# Patient Record
Sex: FEMALE | Race: WHITE | NOT HISPANIC OR LATINO | Employment: OTHER | URBAN - METROPOLITAN AREA
[De-identification: names, ages, dates, MRNs, and addresses within clinical notes are randomized per-mention and may not be internally consistent; named-entity substitution may affect disease eponyms.]

---

## 2017-01-31 ENCOUNTER — GENERIC CONVERSION - ENCOUNTER (OUTPATIENT)
Dept: OTHER | Facility: OTHER | Age: 67
End: 2017-01-31

## 2017-05-02 ENCOUNTER — ALLSCRIPTS OFFICE VISIT (OUTPATIENT)
Dept: OTHER | Facility: OTHER | Age: 67
End: 2017-05-02

## 2017-05-02 DIAGNOSIS — E03.9 HYPOTHYROIDISM: ICD-10-CM

## 2017-05-02 DIAGNOSIS — Z13.820 ENCOUNTER FOR SCREENING FOR OSTEOPOROSIS: ICD-10-CM

## 2017-05-09 ENCOUNTER — LAB CONVERSION - ENCOUNTER (OUTPATIENT)
Dept: OTHER | Facility: OTHER | Age: 67
End: 2017-05-09

## 2017-05-09 LAB
A/G RATIO (HISTORICAL): 1.7 (CALC) (ref 1–2.5)
ALBUMIN SERPL BCP-MCNC: 4.4 G/DL (ref 3.6–5.1)
ALP SERPL-CCNC: 82 U/L (ref 33–130)
ALT SERPL W P-5'-P-CCNC: 18 U/L (ref 6–29)
AST SERPL W P-5'-P-CCNC: 16 U/L (ref 10–35)
BILIRUB SERPL-MCNC: 0.8 MG/DL (ref 0.2–1.2)
BUN SERPL-MCNC: 17 MG/DL (ref 7–25)
BUN/CREA RATIO (HISTORICAL): ABNORMAL (CALC) (ref 6–22)
CALCIUM SERPL-MCNC: 9.5 MG/DL (ref 8.6–10.4)
CHLORIDE SERPL-SCNC: 103 MMOL/L (ref 98–110)
CO2 SERPL-SCNC: 27 MMOL/L (ref 20–31)
CREAT SERPL-MCNC: 0.9 MG/DL (ref 0.5–0.99)
EGFR AFRICAN AMERICAN (HISTORICAL): 76 ML/MIN/1.73M2
EGFR-AMERICAN CALC (HISTORICAL): 66 ML/MIN/1.73M2
GAMMA GLOBULIN (HISTORICAL): 2.6 G/DL (CALC) (ref 1.9–3.7)
GLUCOSE (HISTORICAL): 112 MG/DL (ref 65–99)
POTASSIUM SERPL-SCNC: 4.5 MMOL/L (ref 3.5–5.3)
SODIUM SERPL-SCNC: 138 MMOL/L (ref 135–146)
T4 FREE SERPL-MCNC: 1.2 NG/DL (ref 0.8–1.8)
TOTAL PROTEIN (HISTORICAL): 7 G/DL (ref 6.1–8.1)
TSH SERPL DL<=0.05 MIU/L-ACNC: 1.26 MIU/L (ref 0.4–4.5)

## 2017-05-27 ENCOUNTER — HOSPITAL ENCOUNTER (OUTPATIENT)
Dept: RADIOLOGY | Facility: CLINIC | Age: 67
Discharge: HOME | End: 2017-05-27
Payer: MEDICARE

## 2017-05-27 ENCOUNTER — ALLSCRIPTS OFFICE VISIT (OUTPATIENT)
Dept: OTHER | Facility: OTHER | Age: 67
End: 2017-05-27

## 2017-05-27 DIAGNOSIS — M25.511 RIGHT SHOULDER PAIN: ICD-10-CM

## 2017-05-27 PROCEDURE — 73030 X-RAY EXAM OF SHOULDER: CPT

## 2017-06-20 ENCOUNTER — ALLSCRIPTS OFFICE VISIT (OUTPATIENT)
Dept: OTHER | Facility: OTHER | Age: 67
End: 2017-06-20

## 2017-07-03 ENCOUNTER — ALLSCRIPTS OFFICE VISIT (OUTPATIENT)
Dept: OTHER | Facility: OTHER | Age: 67
End: 2017-07-03

## 2017-07-03 ENCOUNTER — GENERIC CONVERSION - ENCOUNTER (OUTPATIENT)
Dept: OTHER | Facility: OTHER | Age: 67
End: 2017-07-03

## 2017-07-03 LAB
BILIRUB UR QL STRIP: NORMAL
CLARITY UR: NORMAL
COLOR UR: CLEAR
GLUCOSE (HISTORICAL): NORMAL
HGB UR QL STRIP.AUTO: NORMAL
KETONES UR STRIP-MCNC: NORMAL MG/DL
LEUKOCYTE ESTERASE UR QL STRIP: NORMAL
NITRITE UR QL STRIP: POSITIVE
PH UR STRIP.AUTO: 5 [PH]
SP GR UR STRIP.AUTO: 1.01
UROBILINOGEN UR QL STRIP.AUTO: 0.2

## 2017-07-07 ENCOUNTER — ALLSCRIPTS OFFICE VISIT (OUTPATIENT)
Dept: OTHER | Facility: OTHER | Age: 67
End: 2017-07-07

## 2017-07-07 ENCOUNTER — GENERIC CONVERSION - ENCOUNTER (OUTPATIENT)
Dept: OTHER | Facility: OTHER | Age: 67
End: 2017-07-07

## 2017-08-03 ENCOUNTER — GENERIC CONVERSION - ENCOUNTER (OUTPATIENT)
Dept: OTHER | Facility: OTHER | Age: 67
End: 2017-08-03

## 2017-08-04 ENCOUNTER — GENERIC CONVERSION - ENCOUNTER (OUTPATIENT)
Dept: OTHER | Facility: OTHER | Age: 67
End: 2017-08-04

## 2017-08-04 LAB — LYME IGG/IGM AB (HISTORICAL): <0.9 INDEX

## 2018-01-10 NOTE — RESULT NOTES
Verified Results  (Q) LYME DISEASE AB, TOTAL W/REFL WB (IGG, IGM) [id number] 12:00AM Donaldo Bartlett     Test Name Result Flag Reference   LYME AB SCREEN <0 90 index     Index                Interpretation                     -----                --------------                     < 0 90               Negative                     0  90-1 09            Equivocal                     > 1 09               Positive      As recommended by the Food and Drug Administration   (FDA), all samples with positive or equivocal   results in a Borrelia burgdorferi antibody screen  will be tested using a blot method  Positive or   equivocal screening test results should not be   interpreted as truly positive until verified as such   using a supplemental assay (e g , B  burgdorferi blot)  The screening test and/or blot for B  burgdorferi   antibodies may be falsely negative in early stages  of Lyme disease, including the period when erythema   migrans is apparent

## 2018-01-12 NOTE — RESULT NOTES
Verified Results  (1) COMPREHENSIVE METABOLIC PANEL [id number] 84:82NV Noni Moses     Test Name Result Flag Reference   GLUCOSE 103 mg/dL H 65-99   Fasting reference interval   UREA NITROGEN (BUN) 13 mg/dL  7-25   CREATININE 0 76 mg/dL  0 50-0 99   For patients >52years of age, the reference limit  for Creatinine is approximately 13% higher for people  identified as -American  eGFR NON-AFR  AMERICAN 81 mL/min/1 73m2  > OR = 60   eGFR AFRICAN AMERICAN 94 mL/min/1 73m2  > OR = 60   BUN/CREATININE RATIO   7-78   NOT APPLICABLE (calc)   SODIUM 140 mmol/L  135-146   POTASSIUM 4 4 mmol/L  3 5-5 3   CHLORIDE 105 mmol/L     CARBON DIOXIDE 24 mmol/L  19-30   CALCIUM 9 3 mg/dL  8 6-10 4   PROTEIN, TOTAL 6 8 g/dL  6 1-8 1   ALBUMIN 4 3 g/dL  3 6-5 1   GLOBULIN 2 5 g/dL (calc)  1 9-3 7   ALBUMIN/GLOBULIN RATIO 1 7 (calc)  1 0-2 5   BILIRUBIN, TOTAL 0 7 mg/dL  0 2-1 2   ALKALINE PHOSPHATASE 64 U/L     AST 18 U/L  10-35   ALT 21 U/L  6-29     (1) LIPID PANEL, FASTING [id number] 07:00AM Donaldo Bartlett     Test Name Result Flag Reference   CHOLESTEROL, TOTAL 192 mg/dL  125-200   HDL CHOLESTEROL 48 mg/dL  > OR = 46   TRIGLICERIDES 952 mg/dL  <150   LDL-CHOLESTEROL 115 mg/dL (calc)  <130   Desirable range <100 mg/dL for patients with CHD or  diabetes and <70 mg/dL for diabetic patients with  known heart disease  CHOL/HDLC RATIO 4 0 (calc)  < OR = 5 0   NON HDL CHOLESTEROL 144 mg/dL (calc)     Target for non-HDL cholesterol is 30 mg/dL higher than   LDL cholesterol target  (Q) CBC (H/H, RBC, INDICES, WBC, PLT) [id number] 07:00AM Donaldo Bartlett     Test Name Result Flag Reference   WHITE BLOOD CELL COUNT 7 7 Thousand/uL  3 8-10 8   RED BLOOD CELL COUNT 4 64 Million/uL  3 80-5 10   HEMOGLOBIN 13 8 g/dL  11 7-15 5   HEMATOCRIT 41 3 %  35 0-45 0   MCV 88 9 fL  80 0-100 0   MCH 29 7 pg  27 0-33 0   MCHC 33 4 g/dL  32 0-36 0   Specimen was prewarmed to 37 degrees  to obtain results    Cold agglutinin/cryoglobulin suspected  RDW 14 6 %  11 0-15 0   PLATELET COUNT 659 Thousand/uL  140-400   MPV 8 5 fL  7 5-11 5   WE RECEIVED YOUR HANDWRITTEN TEST ORDER AND  PERFORMED A HEMOGRAM WITH A PLATELET WITHOUT  A DIFFERENTIAL  IF THIS IS NOT WHAT YOU INTENDED  TO ORDER, PLEASE CONTACT YOUR LOCAL CLIENT SERVICE  REPRESENTATIVE IMMEDIATELY SO THAT WE CAN   ADJUST OUR BILLING APPROPRIATELY  YOU MAY ALSO   INQUIRE ABOUT ALTERNATIVE OR ADDITIONAL TESTING            (Q) TSH, 3RD GENERATION [id number] 07:00AM Donaldo Bartlett     Test Name Result Flag Reference   TSH 0 95 mIU/L  0 40-4 50   NO COLLECTION DATE RECEIVED  WE HAVE USED  THE DATE THE SPECIMEN WAS RECEIVED BY THIS  LABORATORY AS THE COLLECTION DATE  IF THIS  IS INCORRECT, PLEASE CONTACT CLIENT SERVICES    PHONE NUMBER: [phone]

## 2018-01-13 VITALS
HEIGHT: 62 IN | BODY MASS INDEX: 35.7 KG/M2 | RESPIRATION RATE: 20 BRPM | SYSTOLIC BLOOD PRESSURE: 144 MMHG | WEIGHT: 194 LBS | HEART RATE: 94 BPM | TEMPERATURE: 99 F | OXYGEN SATURATION: 99 % | DIASTOLIC BLOOD PRESSURE: 80 MMHG

## 2018-01-14 VITALS
SYSTOLIC BLOOD PRESSURE: 123 MMHG | BODY MASS INDEX: 35.33 KG/M2 | TEMPERATURE: 98.9 F | HEIGHT: 62 IN | WEIGHT: 192 LBS | DIASTOLIC BLOOD PRESSURE: 80 MMHG

## 2018-01-14 VITALS
HEIGHT: 62 IN | WEIGHT: 194 LBS | TEMPERATURE: 98.6 F | BODY MASS INDEX: 35.7 KG/M2 | SYSTOLIC BLOOD PRESSURE: 130 MMHG | DIASTOLIC BLOOD PRESSURE: 70 MMHG

## 2018-01-14 VITALS
HEIGHT: 62 IN | DIASTOLIC BLOOD PRESSURE: 82 MMHG | TEMPERATURE: 99.4 F | BODY MASS INDEX: 35.33 KG/M2 | WEIGHT: 192 LBS | SYSTOLIC BLOOD PRESSURE: 128 MMHG

## 2018-01-15 NOTE — RESULT NOTES
Message   Please let Kassidy Darden know that her urine culture was negative  Verified Results  (1) URINE CULTURE [id number] 12:00AM Donaldo Bartlett     Test Name Result Flag Reference   CULTURE, URINE, ROUTINE      CULTURE, URINE, ROUTINE         MICRO NUMBER:      [id number]    TEST STATUS:       FINAL    SPECIMEN SOURCE:   URINE    SPECIMEN QUALITY:  ADEQUATE    RESULT:            Multiple organisms present, each less than 10,000                       CFU/mL  These organisms, commonly found on                       external and internal genitalia, are considered                       to be colonizers  No further testing performed

## 2018-01-18 NOTE — RESULT NOTES
Verified Results  (1) URINE CULTURE [id number] 12:00AM Donaldo Bartlett     Test Name Result Flag Reference   CULTURE, URINE, ROUTINE  A    CULTURE, URINE, ROUTINE         MICRO NUMBER:      [id number]    TEST STATUS:       FINAL    SPECIMEN SOURCE:   URINE    SPECIMEN QUALITY:  ADEQUATE    RESULT:            Greater than 100,000 CFU/mL of Escherichia coli                            E coli                            ----------------                            INT   KYLIE     AMOX/CLAVULANATE       I     16 0     AMPICILLIN             R     >=32 0     AMP/SULBACTAM          R     >=32 0     CEFAZOLIN              NR    <=4 0 **1     CEFEPIME               S     <=1 0     CEFTRIAXONE            S     <=1 0     CIPROFLOXACIN          S     <=0 25     ERTAPENEM              S     <=0 5     GENTAMICIN             S     <=1 0     IMIPENEM               S     <=0 25     LEVOFLOXACIN           S     <=0 12     NITROFURANTOIN         S     <=16 0     PIP/TAZOBACTAM         S     <=4 0     TOBRAMYCIN             S     <=1 0     TRIMETHOPRIM/SULFA     R     >=320 0  S=Susceptible  I=Intermediate  R=Resistant  * = Not Tested  NR = Not Reported  **NN = See Therapy Comments  THERAPY COMMENTS      Note 1:      ORAL therapy: A cefazolin KYLIE of < 32 predicts      susceptibility to the oral agents cefaclor,      cefdinir, cefpodoxime, cefprozil, cefuroxime,      cephalexin, and loracarbef when used for therapy      of uncomplicated UTIs due to E  coli,      K  pneumoniae, and P  mirabilis  PARENTERAL therapy: A cefazolin KYLIE of > 8      indicates resistance to parenteral cefazolin  An alternate test method must be performed to      to confirm susceptibility to parenteral cefazolin       (1) T4, FREE [id number] 12:00AM Donaldo Bartlett     Test Name Result Flag Reference   T4, FREE 1 2 ng/dL  0 8-1 8     (1) COMPREHENSIVE METABOLIC PANEL [id number] 92:22[facility]     Test Name Result Flag Reference   GLUCOSE 112 mg/dL H 65-99 Fasting reference interval     For someone without known diabetes, a glucose value  between 100 and 125 mg/dL is consistent with  prediabetes and should be confirmed with a  follow-up test    UREA NITROGEN (BUN) 17 mg/dL  7-25   CREATININE 0 90 mg/dL  0 50-0 99   For patients >52years of age, the reference limit  for Creatinine is approximately 13% higher for people  identified as -American  eGFR NON-AFR   AMERICAN 66 mL/min/1 73m2  > OR = 60   eGFR AFRICAN AMERICAN 76 mL/min/1 73m2  > OR = 60   BUN/CREATININE RATIO   1-83   NOT APPLICABLE (calc)   SODIUM 138 mmol/L  135-146   POTASSIUM 4 5 mmol/L  3 5-5 3   CHLORIDE 103 mmol/L     CARBON DIOXIDE 27 mmol/L  20-31   CALCIUM 9 5 mg/dL  8 6-10 4   PROTEIN, TOTAL 7 0 g/dL  6 1-8 1   ALBUMIN 4 4 g/dL  3 6-5 1   GLOBULIN 2 6 g/dL (calc)  1 9-3 7   ALBUMIN/GLOBULIN RATIO 1 7 (calc)  1 0-2 5   BILIRUBIN, TOTAL 0 8 mg/dL  0 2-1 2   ALKALINE PHOSPHATASE 82 U/L     AST 16 U/L  10-35   ALT 18 U/L  6-29     (Q) TSH, 3RD GENERATION [id number] 12:00AM Luc Hutton     Test Name Result Flag Reference   TSH 1 26 mIU/L  0 40-4 50

## 2018-03-05 PROBLEM — M79.10 MYALGIA: Status: ACTIVE | Noted: 2017-08-03

## 2018-03-05 PROBLEM — R73.01 IMPAIRED FASTING GLUCOSE: Status: ACTIVE | Noted: 2017-07-03

## 2018-03-05 PROBLEM — M25.511 RIGHT SHOULDER PAIN: Status: ACTIVE | Noted: 2017-05-27

## 2018-03-06 ENCOUNTER — OFFICE VISIT (OUTPATIENT)
Dept: FAMILY MEDICINE CLINIC | Facility: CLINIC | Age: 68
End: 2018-03-06
Payer: MEDICARE

## 2018-03-06 VITALS
HEIGHT: 62 IN | BODY MASS INDEX: 36.25 KG/M2 | SYSTOLIC BLOOD PRESSURE: 122 MMHG | WEIGHT: 197 LBS | DIASTOLIC BLOOD PRESSURE: 72 MMHG

## 2018-03-06 DIAGNOSIS — Z12.39 SCREENING BREAST EXAMINATION: ICD-10-CM

## 2018-03-06 DIAGNOSIS — J06.9 VIRAL UPPER RESPIRATORY TRACT INFECTION: ICD-10-CM

## 2018-03-06 DIAGNOSIS — E66.9 OBESITY (BMI 30-39.9): ICD-10-CM

## 2018-03-06 DIAGNOSIS — E03.9 ACQUIRED HYPOTHYROIDISM: ICD-10-CM

## 2018-03-06 DIAGNOSIS — I10 BENIGN ESSENTIAL HTN: Primary | ICD-10-CM

## 2018-03-06 DIAGNOSIS — R73.01 IMPAIRED FASTING GLUCOSE: ICD-10-CM

## 2018-03-06 PROCEDURE — 36415 COLL VENOUS BLD VENIPUNCTURE: CPT | Performed by: FAMILY MEDICINE

## 2018-03-06 PROCEDURE — 99214 OFFICE O/P EST MOD 30 MIN: CPT | Performed by: FAMILY MEDICINE

## 2018-03-06 NOTE — PROGRESS NOTES
Assessment/Plan:  Acquired hypothyroidism  She appears euthyroid today  Will get TSH  She will continue on current dose of levothyroxine for now  Follow up when results are available  Impaired fasting glucose  Will await results of CMP  Potentially a hemoglobin A1c based on this result  Viral upper respiratory tract infection  We asked her to switch to Coricidin HBP and avoid over-the-counter preparations which include decongestants which she had been taking  Benign essential HTN  Blood pressure is well controlled today  She will continue her lisinopril/hydrochlorothiazide  Will wait results of CBC and CMP as well as lipids today  Cardiac murmur  Murmurs felt to be consistent with mild AS  She is asymptomatic presently  Will continue to observe  Obesity (BMI 30-39  9)  We discussed working on diet and exercise for weight loss  Screening breast examination  She is given a slip for screening mammography  The patient is a 50-year-old female who presents today for follow-up of hypothyroidism, essential hypertension, obesity as well as impaired fasting glucose  She also request slip for mammography which was provided  She has a cardiac murmur on examination which may represent mild aortic sclerosis  She is asymptomatic from a cardiovascular standpoint  She did see a cardiologist in [place] [address] in the past and was told that she has no issues though she does not recall having echocardiography  Today we obtain fasting blood work to include CBC CMP lipids as well as TSH  Will follow up with her when results are available  We will refill her levothyroxine as well as lisinopril/hydrochlorothiazide at that time  Will await results for mammography  She does have an upper respiratory infection as well today  It appears to be viral in nature  She is asked to continue with over-the-counter medications though void decongestants and switched to Coricidin HBP  She agrees    Will see her back in 6 months or sooner as needed  She agrees  Diagnoses and all orders for this visit:    Benign essential HTN  -     CBC  -     Comprehensive metabolic panel    Acquired hypothyroidism  -     Lipid panel  -     TSH, 3rd generation with T4 reflex    Impaired fasting glucose    Obesity (BMI 30-39  9)    Screening breast examination  -     Mammo screening bilateral w cad    Viral upper respiratory tract infection          Subjective:   Chief Complaint   Patient presents with    Follow-up     6 month check/fbw     I have a cold for 3-4 days   ill for a week  Patient ID: Galina Herrmann is a 70 y o  female  HPI  The patient is a 76-year-old female presents for routine follow-up of central hypertension, hypothyroidism as well as impaired fasting glucose and obesity  She has no additional complaints of persistent upper respiratory infection of several days today  She states her  has been ill for approximately a week and she has had congestion postnasal drip nasal discharge for 3-4 days  She has had no severe headache myalgias fever shortness of breath chest pain, edema, PND orthopnea X cetera  She has had a cough which has been essentially nonproductive  She has had no headaches no diplopia no constipation diarrhea change in her hair skin or nails X cetera  The following portions of the patient's history were reviewed and updated as appropriate: allergies, current medications, past medical history, past social history and problem list     Review of Systems   Constitution: Negative for fever  HENT: Positive for congestion  Negative for sore throat  Cardiovascular: Negative for chest pain, dyspnea on exertion, leg swelling, near-syncope and orthopnea  Respiratory: Positive for cough  Negative for shortness of breath, sputum production and wheezing  Musculoskeletal: Negative for myalgias  Gastrointestinal: Negative for constipation, diarrhea and heartburn     Genitourinary: Negative for bladder incontinence, hesitancy and nocturia  Neurological: Negative for headaches  Objective:    Physical Exam   Constitutional: She is oriented to person, place, and time  She appears well-developed and well-nourished  Obese and in no apparent distress   HENT:   Mouth/Throat: No oropharyngeal exudate  Eyes: Pupils are equal, round, and reactive to light  Neck: Neck supple  No JVD present  No thyromegaly present  Cardiovascular: Normal rate and regular rhythm  Exam reveals no gallop  Murmur heard  She does have a 1 to 2/6 systolic ejection murmur in the aortic area  S2 closing sound is crisp  Pulmonary/Chest: Effort normal and breath sounds normal  No respiratory distress  Musculoskeletal: She exhibits no edema  Lymphadenopathy:     She has no cervical adenopathy  Neurological: She is alert and oriented to person, place, and time   Coordination normal    Skin:   Hair skin and nails all appear normal

## 2018-03-07 LAB
ALBUMIN SERPL-MCNC: 4.6 G/DL (ref 3.6–5.1)
ALBUMIN/GLOB SERPL: 1.8 (CALC) (ref 1–2.5)
ALP SERPL-CCNC: 92 U/L (ref 33–130)
ALT SERPL-CCNC: 29 U/L (ref 6–29)
AST SERPL-CCNC: 26 U/L (ref 10–35)
BASOPHILS # BLD AUTO: 99 CELLS/UL (ref 0–200)
BASOPHILS NFR BLD AUTO: 1.3 %
BILIRUB SERPL-MCNC: 1.1 MG/DL (ref 0.2–1.2)
BUN SERPL-MCNC: 15 MG/DL (ref 7–25)
BUN/CREAT SERPL: NORMAL (CALC) (ref 6–22)
CALCIUM SERPL-MCNC: 9.4 MG/DL (ref 8.6–10.4)
CHLORIDE SERPL-SCNC: 103 MMOL/L (ref 98–110)
CHOLEST SERPL-MCNC: 197 MG/DL
CHOLEST/HDLC SERPL: 4 (CALC)
CO2 SERPL-SCNC: 25 MMOL/L (ref 20–31)
CREAT SERPL-MCNC: 0.72 MG/DL (ref 0.5–0.99)
EOSINOPHIL # BLD AUTO: 160 CELLS/UL (ref 15–500)
EOSINOPHIL NFR BLD AUTO: 2.1 %
ERYTHROCYTE [DISTWIDTH] IN BLOOD BY AUTOMATED COUNT: 14.1 % (ref 11–15)
GLOBULIN SER CALC-MCNC: 2.6 G/DL (CALC) (ref 1.9–3.7)
GLUCOSE SERPL-MCNC: 96 MG/DL (ref 65–99)
HCT VFR BLD AUTO: 38 % (ref 35–45)
HDLC SERPL-MCNC: 49 MG/DL
HGB BLD-MCNC: 12.5 G/DL (ref 11.7–15.5)
LDLC SERPL CALC-MCNC: 116 MG/DL (CALC)
LYMPHOCYTES # BLD AUTO: 2174 CELLS/UL (ref 850–3900)
LYMPHOCYTES NFR BLD AUTO: 28.6 %
MCH RBC QN AUTO: 30 PG (ref 27–33)
MCHC RBC AUTO-ENTMCNC: 32.9 G/DL (ref 32–36)
MCV RBC AUTO: 91.1 FL (ref 80–100)
MONOCYTES # BLD AUTO: 509 CELLS/UL (ref 200–950)
MONOCYTES NFR BLD AUTO: 6.7 %
NEUTROPHILS # BLD AUTO: 4659 CELLS/UL (ref 1500–7800)
NEUTROPHILS NFR BLD AUTO: 61.3 %
NONHDLC SERPL-MCNC: 148 MG/DL (CALC)
PLATELET # BLD AUTO: 390 THOUSAND/UL (ref 140–400)
PMV BLD REES-ECKER: 9.5 FL (ref 7.5–12.5)
POTASSIUM SERPL-SCNC: 4.2 MMOL/L (ref 3.5–5.3)
PROT SERPL-MCNC: 7.2 G/DL (ref 6.1–8.1)
RBC # BLD AUTO: 4.17 MILLION/UL (ref 3.8–5.1)
SL AMB EGFR AFRICAN AMERICAN: 99 ML/MIN/1.73M2
SL AMB EGFR NON AFRICAN AMERICAN: 85 ML/MIN/1.73M2
SODIUM SERPL-SCNC: 139 MMOL/L (ref 135–146)
TRIGL SERPL-MCNC: 207 MG/DL
TSH SERPL-ACNC: 0.78 MIU/L (ref 0.4–4.5)
WBC # BLD AUTO: 7.6 THOUSAND/UL (ref 3.8–10.8)

## 2018-03-07 NOTE — ASSESSMENT & PLAN NOTE
We asked her to switch to Coricidin HBP and avoid over-the-counter preparations which include decongestants which she had been taking

## 2018-03-07 NOTE — ASSESSMENT & PLAN NOTE
Blood pressure is well controlled today  She will continue her lisinopril/hydrochlorothiazide  Will wait results of CBC and CMP as well as lipids today

## 2018-03-07 NOTE — ASSESSMENT & PLAN NOTE
She appears euthyroid today  Will get TSH  She will continue on current dose of levothyroxine for now  Follow up when results are available

## 2018-03-07 NOTE — ASSESSMENT & PLAN NOTE
Murmurs felt to be consistent with mild AS  She is asymptomatic presently  Will continue to observe

## 2018-03-23 DIAGNOSIS — E03.9 HYPOTHYROIDISM, UNSPECIFIED TYPE: Primary | ICD-10-CM

## 2018-03-23 DIAGNOSIS — I10 ESSENTIAL HYPERTENSION: ICD-10-CM

## 2018-03-23 RX ORDER — LISINOPRIL AND HYDROCHLOROTHIAZIDE 20; 12.5 MG/1; MG/1
1 TABLET ORAL DAILY
Qty: 90 TABLET | Refills: 1 | Status: SHIPPED | OUTPATIENT
Start: 2018-03-23 | End: 2018-09-14 | Stop reason: SDUPTHER

## 2018-03-23 RX ORDER — LEVOTHYROXINE SODIUM 0.05 MG/1
50 TABLET ORAL DAILY
Qty: 90 TABLET | Refills: 1 | Status: SHIPPED | OUTPATIENT
Start: 2018-03-23 | End: 2018-09-14 | Stop reason: SDUPTHER

## 2018-09-11 ENCOUNTER — OFFICE VISIT (OUTPATIENT)
Dept: FAMILY MEDICINE CLINIC | Facility: CLINIC | Age: 68
End: 2018-09-11
Payer: MEDICARE

## 2018-09-11 VITALS
SYSTOLIC BLOOD PRESSURE: 120 MMHG | HEART RATE: 67 BPM | HEIGHT: 62 IN | TEMPERATURE: 97.9 F | WEIGHT: 191 LBS | DIASTOLIC BLOOD PRESSURE: 70 MMHG | OXYGEN SATURATION: 99 % | BODY MASS INDEX: 35.15 KG/M2

## 2018-09-11 DIAGNOSIS — I10 BENIGN ESSENTIAL HTN: ICD-10-CM

## 2018-09-11 DIAGNOSIS — E66.9 OBESITY (BMI 30-39.9): ICD-10-CM

## 2018-09-11 DIAGNOSIS — E03.9 ACQUIRED HYPOTHYROIDISM: Primary | ICD-10-CM

## 2018-09-11 DIAGNOSIS — R73.01 IMPAIRED FASTING GLUCOSE: ICD-10-CM

## 2018-09-11 DIAGNOSIS — E03.9 HYPOTHYROIDISM, UNSPECIFIED TYPE: ICD-10-CM

## 2018-09-11 DIAGNOSIS — Z11.59 NEED FOR HEPATITIS C SCREENING TEST: ICD-10-CM

## 2018-09-11 DIAGNOSIS — Z13.0 SCREENING FOR IRON DEFICIENCY ANEMIA: ICD-10-CM

## 2018-09-11 DIAGNOSIS — Z13.220 SCREENING, LIPID: ICD-10-CM

## 2018-09-11 PROBLEM — M25.511 RIGHT SHOULDER PAIN: Status: RESOLVED | Noted: 2017-05-27 | Resolved: 2018-09-11

## 2018-09-11 PROBLEM — M79.10 MYALGIA: Status: RESOLVED | Noted: 2017-08-03 | Resolved: 2018-09-11

## 2018-09-11 PROBLEM — J06.9 VIRAL UPPER RESPIRATORY TRACT INFECTION: Status: RESOLVED | Noted: 2018-03-06 | Resolved: 2018-09-11

## 2018-09-11 PROCEDURE — 36415 COLL VENOUS BLD VENIPUNCTURE: CPT | Performed by: FAMILY MEDICINE

## 2018-09-11 PROCEDURE — 99214 OFFICE O/P EST MOD 30 MIN: CPT | Performed by: FAMILY MEDICINE

## 2018-09-11 RX ORDER — LEVOTHYROXINE SODIUM 0.05 MG/1
50 TABLET ORAL DAILY
Qty: 90 TABLET | Refills: 0 | Status: CANCELLED | OUTPATIENT
Start: 2018-09-11

## 2018-09-11 NOTE — PROGRESS NOTES
Assessment/Plan:  Acquired hypothyroidism  Will get a TSH today  Will follow up with her when results are available  She does appear euthyroid  She did lose 6 lb since her last visit  Impaired fasting glucose  We asked her to continue to try to lose weight toward goal of improving her glucose tolerance  She agrees  Now that the weather may be improving we asked her to try to increase her exercise pattern  Benign essential HTN  Blood pressure well controlled today  Continue with current regimen  Obesity (BMI 30-39  9)  Continued attempts at weight loss including improved exercise regimen as well as diet  CBC, CMP, lipids, TSH     Diagnoses and all orders for this visit:    Acquired hypothyroidism    Hypothyroidism, unspecified type    Impaired fasting glucose    Benign essential HTN    Obesity (BMI 30-39  9)    Other orders  -     Cancel: levothyroxine 50 mcg tablet; Take 1 tablet (50 mcg total) by mouth daily          Subjective:   Chief Complaint   Patient presents with    med check     pt here for her 6 month med check  she is fasting so i will drawl her blood  she does need refills which i did renew  Patient ID: Frederick Enriquez is a 70 y o  female  HPI  The patient is a 21-year-old female presents today for routine follow-up of essential hypertension as well as hypothyroidism  Additionally she suffers from obesity  She also asked evaluation of a lesion on her scalp as well as her neckline which have been present for several years  One her scalp is significantly irritated from her comb or brush and once on her neck as well  She denies any cardiovascular pulmonary complaint  She has no constipation or other GI or  complaint  She does admit to lack of exercise that she is trying to watch her diet somewhat  We note that she has lost 6 lb since her last visit  Hair skin and nails are without complaint    The following portions of the patient's history were reviewed and updated as appropriate: allergies, current medications, past family history, past medical history, past social history, past surgical history and problem list     Review of Systems   Constitution: Positive for weight loss  Negative for decreased appetite  Cardiovascular: Negative for chest pain, irregular heartbeat, leg swelling and palpitations  Respiratory: Negative for shortness of breath and wheezing  Gastrointestinal: Negative  Genitourinary: Negative  Neurological: Negative  Psychiatric/Behavioral: Negative for depression and suicidal ideas  The patient is not nervous/anxious  Objective:    Physical Exam   Constitutional: She is oriented to person, place, and time  She appears well-developed and well-nourished  Obese   Eyes: No scleral icterus  Neck: Neck supple  No JVD present  No thyromegaly present  Cardiovascular: Normal rate and regular rhythm  Pulmonary/Chest: Effort normal and breath sounds normal  No respiratory distress  She has no wheezes  She has no rales  Musculoskeletal: She exhibits no edema  Lymphadenopathy:     She has no cervical adenopathy  Neurological: She is alert and oriented to person, place, and time  She displays normal reflexes  Skin: Skin is warm and dry  Hair skin and hairs appear normal   Psychiatric: She has a normal mood and affect   Thought content normal

## 2018-09-11 NOTE — ASSESSMENT & PLAN NOTE
We asked her to continue to try to lose weight toward goal of improving her glucose tolerance  She agrees  Now that the weather may be improving we asked her to try to increase her exercise pattern

## 2018-09-14 DIAGNOSIS — E03.9 HYPOTHYROIDISM, UNSPECIFIED TYPE: ICD-10-CM

## 2018-09-14 DIAGNOSIS — I10 ESSENTIAL HYPERTENSION: ICD-10-CM

## 2018-09-14 LAB
ALBUMIN SERPL-MCNC: 4.3 G/DL (ref 3.6–5.1)
ALBUMIN/GLOB SERPL: 1.7 (CALC) (ref 1–2.5)
ALP SERPL-CCNC: 80 U/L (ref 33–130)
ALT SERPL-CCNC: 30 U/L (ref 6–29)
AST SERPL-CCNC: 24 U/L (ref 10–35)
BILIRUB SERPL-MCNC: 0.8 MG/DL (ref 0.2–1.2)
BUN SERPL-MCNC: 15 MG/DL (ref 7–25)
BUN/CREAT SERPL: ABNORMAL (CALC) (ref 6–22)
CALCIUM SERPL-MCNC: 9.4 MG/DL (ref 8.6–10.4)
CHLORIDE SERPL-SCNC: 106 MMOL/L (ref 98–110)
CHOLEST SERPL-MCNC: 196 MG/DL
CHOLEST/HDLC SERPL: 3.6 (CALC)
CO2 SERPL-SCNC: 25 MMOL/L (ref 20–32)
CREAT SERPL-MCNC: 0.7 MG/DL (ref 0.5–0.99)
ERYTHROCYTE [DISTWIDTH] IN BLOOD BY AUTOMATED COUNT: 14 % (ref 11–15)
GLOBULIN SER CALC-MCNC: 2.6 G/DL (CALC) (ref 1.9–3.7)
GLUCOSE SERPL-MCNC: 110 MG/DL (ref 65–99)
HBA1C MFR BLD: 4.7 % OF TOTAL HGB
HCT VFR BLD AUTO: 39.9 % (ref 35–45)
HCV AB S/CO SERPL IA: 0.01
HCV AB SERPL QL IA: NORMAL
HDLC SERPL-MCNC: 55 MG/DL
HGB BLD-MCNC: 13.5 G/DL (ref 11.7–15.5)
LDLC SERPL CALC-MCNC: 116 MG/DL (CALC)
MCH RBC QN AUTO: 30.3 PG (ref 27–33)
MCHC RBC AUTO-ENTMCNC: 33.8 G/DL (ref 32–36)
MCV RBC AUTO: 89.5 FL (ref 80–100)
NONHDLC SERPL-MCNC: 141 MG/DL (CALC)
PLATELET # BLD AUTO: 352 THOUSAND/UL (ref 140–400)
PMV BLD REES-ECKER: 9.4 FL (ref 7.5–12.5)
POTASSIUM SERPL-SCNC: 4.4 MMOL/L (ref 3.5–5.3)
PROT SERPL-MCNC: 6.9 G/DL (ref 6.1–8.1)
RBC # BLD AUTO: 4.46 MILLION/UL (ref 3.8–5.1)
REF LAB TEST NAME: NORMAL
REF LAB TEST: NORMAL
SL AMB CLIENT CONTACT: NORMAL
SL AMB EGFR AFRICAN AMERICAN: 102 ML/MIN/1.73M2
SL AMB EGFR NON AFRICAN AMERICAN: 88 ML/MIN/1.73M2
SODIUM SERPL-SCNC: 140 MMOL/L (ref 135–146)
TRIGL SERPL-MCNC: 137 MG/DL
TSH SERPL-ACNC: 1.3 MIU/L (ref 0.4–4.5)
WBC # BLD AUTO: 8.1 THOUSAND/UL (ref 3.8–10.8)

## 2018-09-14 RX ORDER — LEVOTHYROXINE SODIUM 0.05 MG/1
50 TABLET ORAL DAILY
Qty: 90 TABLET | Refills: 0 | Status: SHIPPED | OUTPATIENT
Start: 2018-09-14 | End: 2018-12-12 | Stop reason: SDUPTHER

## 2018-09-14 RX ORDER — LISINOPRIL AND HYDROCHLOROTHIAZIDE 20; 12.5 MG/1; MG/1
1 TABLET ORAL DAILY
Qty: 90 TABLET | Refills: 0 | Status: SHIPPED | OUTPATIENT
Start: 2018-09-14 | End: 2018-12-12 | Stop reason: SDUPTHER

## 2018-12-04 ENCOUNTER — OFFICE VISIT (OUTPATIENT)
Dept: FAMILY MEDICINE CLINIC | Facility: CLINIC | Age: 68
End: 2018-12-04
Payer: MEDICARE

## 2018-12-04 VITALS
BODY MASS INDEX: 34.78 KG/M2 | WEIGHT: 189 LBS | SYSTOLIC BLOOD PRESSURE: 122 MMHG | HEIGHT: 62 IN | TEMPERATURE: 98.1 F | DIASTOLIC BLOOD PRESSURE: 74 MMHG | OXYGEN SATURATION: 98 % | HEART RATE: 70 BPM

## 2018-12-04 DIAGNOSIS — H69.82 DYSFUNCTION OF LEFT EUSTACHIAN TUBE: Primary | ICD-10-CM

## 2018-12-04 DIAGNOSIS — Z23 NEED FOR INFLUENZA VACCINATION: ICD-10-CM

## 2018-12-04 PROBLEM — H69.92 DYSFUNCTION OF LEFT EUSTACHIAN TUBE: Status: ACTIVE | Noted: 2018-12-04

## 2018-12-04 PROCEDURE — 99213 OFFICE O/P EST LOW 20 MIN: CPT | Performed by: FAMILY MEDICINE

## 2018-12-04 RX ORDER — DEXTROMETHORPHAN HYDROBROMIDE AND PROMETHAZINE HYDROCHLORIDE 15; 6.25 MG/5ML; MG/5ML
5 SYRUP ORAL 4 TIMES DAILY PRN
Qty: 118 ML | Refills: 0 | Status: SHIPPED | OUTPATIENT
Start: 2018-12-04 | End: 2019-01-26

## 2018-12-04 RX ORDER — FLUTICASONE PROPIONATE 50 MCG
2 SPRAY, SUSPENSION (ML) NASAL DAILY
Qty: 16 G | Refills: 2 | Status: SHIPPED | OUTPATIENT
Start: 2018-12-04 | End: 2019-01-26

## 2018-12-04 NOTE — ASSESSMENT & PLAN NOTE
She appears to have a serous otitis media most likely based on eustachian tube dysfunction and viral URI  We are going to give her a prescription for fluticasone nasal spray as well as promethazine DM for congestion  She is asked to call in 72 hours of her symptoms are not improving  She will call sooner should she develop worsening ear pain fever drainage X cetera  She agrees

## 2018-12-04 NOTE — PROGRESS NOTES
Assessment/Plan:  Dysfunction of left eustachian tube  She appears to have a serous otitis media most likely based on eustachian tube dysfunction and viral URI  We are going to give her a prescription for fluticasone nasal spray as well as promethazine DM for congestion  She is asked to call in 72 hours of her symptoms are not improving  She will call sooner should she develop worsening ear pain fever drainage X cetera  She agrees  Diagnoses and all orders for this visit:    Dysfunction of left eustachian tube  -     fluticasone (FLONASE) 50 mcg/act nasal spray; 2 sprays into each nostril daily  -     promethazine-dextromethorphan (PHENERGAN-DM) 6 25-15 mg/5 mL oral syrup; Take 5 mL by mouth 4 (four) times a day as needed (congestion)    Need for influenza vaccination  -     PREFERRED: influenza vaccine, 8622-4001, high-dose, PF 0 5 mL, for patients 65 yr+ (FLUZONE HIGH-DOSE)          Subjective:   Chief Complaint   Patient presents with    Earache     L ear  pt declined the flu shot  My L ear hurts  I used to have swimmers ear frequently  It feels like it  Runny nose  No ST, fever, cough  No drainage or worse hearing  Present for 2 days  Patient ID: Juanjose Thompson is a 70 y o  female  HPI  The patient is a 65-year-old female who presents today with a complaint of acute left ear pain  This developed 2 days ago  She states it is painful and it is worse when she climbs of [place] her car  She does have some nasal congestion though she has had no sore throat fever cough  She has had no drainage from the year and an her hearing has been normal   She did get swimmer's ear frequently as a child but has had no drainage or pain with traction    The following portions of the patient's history were reviewed and updated as appropriate: allergies, current medications, past family history, past medical history, past social history, past surgical history and problem list     ROS    Limited review of systems as per the HPI  Objective:    Physical Exam   Constitutional: She is oriented to person, place, and time  She appears well-developed and well-nourished  HENT:   Right Ear: External ear normal    Left Ear: External ear normal    Mouth/Throat: Oropharynx is clear and moist  No oropharyngeal exudate  Ear canals appear normal bilaterally  She does have a serous otitis media on the left  She is boggy nasal turbinates with watery discharge as well  Eyes: Conjunctivae are normal    Neck: Neck supple  No JVD present  No thyromegaly present  Cardiovascular: Normal rate and regular rhythm  Pulmonary/Chest: Effort normal and breath sounds normal    Neurological: She is alert and oriented to person, place, and time  Nursing note and vitals reviewed

## 2018-12-12 DIAGNOSIS — E03.9 HYPOTHYROIDISM, UNSPECIFIED TYPE: ICD-10-CM

## 2018-12-12 DIAGNOSIS — I10 ESSENTIAL HYPERTENSION: ICD-10-CM

## 2018-12-12 RX ORDER — LEVOTHYROXINE SODIUM 0.05 MG/1
50 TABLET ORAL DAILY
Qty: 90 TABLET | Refills: 0 | Status: SHIPPED | OUTPATIENT
Start: 2018-12-12 | End: 2019-03-04 | Stop reason: SDUPTHER

## 2018-12-12 RX ORDER — LISINOPRIL AND HYDROCHLOROTHIAZIDE 20; 12.5 MG/1; MG/1
1 TABLET ORAL DAILY
Qty: 90 TABLET | Refills: 0 | Status: SHIPPED | OUTPATIENT
Start: 2018-12-12 | End: 2019-03-04 | Stop reason: SDUPTHER

## 2018-12-17 DIAGNOSIS — H60.502 ACUTE OTITIS EXTERNA OF LEFT EAR, UNSPECIFIED TYPE: Primary | ICD-10-CM

## 2018-12-17 RX ORDER — NEOMYCIN SULFATE, POLYMYXIN B SULFATE AND HYDROCORTISONE 10; 3.5; 1 MG/ML; MG/ML; [USP'U]/ML
4 SUSPENSION/ DROPS AURICULAR (OTIC) 4 TIMES DAILY
Qty: 10 ML | Refills: 0 | Status: SHIPPED | OUTPATIENT
Start: 2018-12-17 | End: 2019-01-26

## 2018-12-17 NOTE — PROGRESS NOTES
The patient calls tonight stating that she has increased left otalgia  She also has drainage in itching  We are going to treat her for possible otitis externa with Cortisporin otic suspension  She is asked to call in 5 7 days if her symptoms have not resolved

## 2019-01-26 ENCOUNTER — OFFICE VISIT (OUTPATIENT)
Dept: FAMILY MEDICINE CLINIC | Facility: CLINIC | Age: 69
End: 2019-01-26
Payer: MEDICARE

## 2019-01-26 VITALS
TEMPERATURE: 98.9 F | SYSTOLIC BLOOD PRESSURE: 118 MMHG | HEIGHT: 62 IN | DIASTOLIC BLOOD PRESSURE: 80 MMHG | WEIGHT: 186 LBS | BODY MASS INDEX: 34.23 KG/M2

## 2019-01-26 DIAGNOSIS — B34.9 VIRAL SYNDROME: Primary | ICD-10-CM

## 2019-01-26 DIAGNOSIS — I10 BENIGN ESSENTIAL HTN: ICD-10-CM

## 2019-01-26 PROCEDURE — 99214 OFFICE O/P EST MOD 30 MIN: CPT | Performed by: FAMILY MEDICINE

## 2019-01-26 RX ORDER — METHYLPREDNISOLONE 4 MG/1
TABLET ORAL
Qty: 1 EACH | Refills: 0 | Status: SHIPPED | OUTPATIENT
Start: 2019-01-26 | End: 2019-03-12 | Stop reason: ALTCHOICE

## 2019-01-26 RX ORDER — DEXTROMETHORPHAN HYDROBROMIDE AND PROMETHAZINE HYDROCHLORIDE 15; 6.25 MG/5ML; MG/5ML
5 SYRUP ORAL 4 TIMES DAILY PRN
Qty: 118 ML | Refills: 0 | Status: SHIPPED | OUTPATIENT
Start: 2019-01-26 | End: 2019-03-12 | Stop reason: ALTCHOICE

## 2019-01-26 NOTE — ASSESSMENT & PLAN NOTE
Her blood pressure remains well controlled  She is going to continue with lisinopril hydrochlorothiazide  She knows to avoid decongestants

## 2019-01-26 NOTE — ASSESSMENT & PLAN NOTE
Patient is a viral syndrome  No evidence of pneumonitis sinusitis X cetera  We are going to have her push fluids, continue using promethazine DM and will add a Medrol Dosepak  I do believe there is some element of bronchospasm by history though this is not clinically evident presently  She is asked to call Monday with report of her condition  She will call sooner or seek more urgent medical attention as needed  She agrees

## 2019-01-26 NOTE — PROGRESS NOTES
Assessment/Plan:  Viral syndrome  Patient is a viral syndrome  No evidence of pneumonitis sinusitis X cetera  We are going to have her push fluids, continue using promethazine DM and will add a Medrol Dosepak  I do believe there is some element of bronchospasm by history though this is not clinically evident presently  She is asked to call Monday with report of her condition  She will call sooner or seek more urgent medical attention as needed  She agrees  Benign essential HTN  Her blood pressure remains well controlled  She is going to continue with lisinopril hydrochlorothiazide  She knows to avoid decongestants  Diagnoses and all orders for this visit:    Viral syndrome  -     methylPREDNISolone 4 MG tablet therapy pack; Use as directed on package  -     promethazine-dextromethorphan (PHENERGAN-DM) 6 25-15 mg/5 mL oral syrup; Take 5 mL by mouth 4 (four) times a day as needed for cough    Benign essential HTN          Subjective:   Chief Complaint   Patient presents with    Cough     I have been coughing for 10 days  I tried multiple OTCs but did not receive much relief  Promethazine seemed to help overnight  No fever  No CP, SOB, edema  Worse overnight  Patient ID: Chayo Miller is a 70 y o  female  HPI  Patient is a 19-year-old female with history of hypertension, hypothyroidism and obesity who presents today stating that she has been coughing for 10 days  The cough keeps her awake at night  He her 's had similar symptoms  She has had no fever  She has had no chest pain shortness of breath PND orthopnea edema X cetera  It tends to worsen overnight  She did not have a flu vaccine  She has tried multiple over-the-counter medications such as Coricidin HBP and Mucinex without much effect    She did try promethazine that was given to her  last night at gave her good night's rest   The following portions of the patient's history were reviewed and updated as appropriate: allergies, current medications, past family history, past medical history, past social history, past surgical history and problem list     Review of Systems   Constitution: Negative for chills, decreased appetite and fever  Eyes: Negative for discharge  Cardiovascular: Negative for leg swelling, orthopnea and paroxysmal nocturnal dyspnea  Respiratory: Positive for cough  Negative for hemoptysis, shortness of breath, sputum production and wheezing  Skin: Negative for rash  Gastrointestinal: Negative for constipation, diarrhea and nausea  Objective:    Physical Exam   Constitutional: She is oriented to person, place, and time  She appears well-developed and well-nourished  HENT:   Mouth/Throat: No oropharyngeal exudate  Boggy nasal turbinates with watery nasal discharge  TMs are normal   Oral cavity oropharynx is without ulcer exudate or lesion  No increased Waldeyer's ring  No facial bone tenderness  Eyes: Right eye exhibits no discharge  Left eye exhibits no discharge  No scleral icterus  Neck: No JVD present  Cardiovascular: Normal rate, regular rhythm and normal heart sounds  Pulmonary/Chest: Effort normal and breath sounds normal  No respiratory distress  She has no wheezes  She has no rales  Musculoskeletal: She exhibits no edema  Lymphadenopathy:     She has no cervical adenopathy  Neurological: She is alert and oriented to person, place, and time  Psychiatric: She has a normal mood and affect  Thought content normal    Nursing note and vitals reviewed

## 2019-03-04 DIAGNOSIS — I10 ESSENTIAL HYPERTENSION: ICD-10-CM

## 2019-03-04 DIAGNOSIS — E03.9 HYPOTHYROIDISM, UNSPECIFIED TYPE: ICD-10-CM

## 2019-03-04 RX ORDER — LEVOTHYROXINE SODIUM 0.05 MG/1
50 TABLET ORAL DAILY
Qty: 90 TABLET | Refills: 0 | Status: SHIPPED | OUTPATIENT
Start: 2019-03-04 | End: 2019-06-14 | Stop reason: SDUPTHER

## 2019-03-04 RX ORDER — LISINOPRIL AND HYDROCHLOROTHIAZIDE 20; 12.5 MG/1; MG/1
1 TABLET ORAL DAILY
Qty: 90 TABLET | Refills: 0 | Status: SHIPPED | OUTPATIENT
Start: 2019-03-04 | End: 2019-06-14 | Stop reason: SDUPTHER

## 2019-03-12 ENCOUNTER — OFFICE VISIT (OUTPATIENT)
Dept: FAMILY MEDICINE CLINIC | Facility: CLINIC | Age: 69
End: 2019-03-12
Payer: MEDICARE

## 2019-03-12 VITALS
BODY MASS INDEX: 34.23 KG/M2 | TEMPERATURE: 98.2 F | OXYGEN SATURATION: 98 % | SYSTOLIC BLOOD PRESSURE: 120 MMHG | HEART RATE: 68 BPM | WEIGHT: 186 LBS | HEIGHT: 62 IN | DIASTOLIC BLOOD PRESSURE: 72 MMHG

## 2019-03-12 DIAGNOSIS — I10 BENIGN ESSENTIAL HTN: ICD-10-CM

## 2019-03-12 DIAGNOSIS — E03.9 ACQUIRED HYPOTHYROIDISM: ICD-10-CM

## 2019-03-12 DIAGNOSIS — R73.01 IMPAIRED FASTING GLUCOSE: ICD-10-CM

## 2019-03-12 DIAGNOSIS — Z00.00 MEDICARE ANNUAL WELLNESS VISIT, INITIAL: Primary | ICD-10-CM

## 2019-03-12 DIAGNOSIS — Z13.0 SCREENING FOR IRON DEFICIENCY ANEMIA: ICD-10-CM

## 2019-03-12 PROBLEM — B34.9 VIRAL SYNDROME: Status: RESOLVED | Noted: 2019-01-26 | Resolved: 2019-03-12

## 2019-03-12 PROCEDURE — G0438 PPPS, INITIAL VISIT: HCPCS | Performed by: FAMILY MEDICINE

## 2019-03-12 PROCEDURE — 99213 OFFICE O/P EST LOW 20 MIN: CPT | Performed by: FAMILY MEDICINE

## 2019-03-12 NOTE — PROGRESS NOTES
Robert Palma in her intra-ocular Assessment and Plan:    Problem List Items Addressed This Visit        Endocrine    Impaired fasting glucose    Relevant Orders    Comprehensive metabolic panel    Acquired hypothyroidism    Relevant Orders    TSH, 3rd generation       Cardiovascular and Mediastinum    Benign essential HTN    Relevant Orders    Lipid panel       Other    Medicare annual wellness visit, initial - Primary     The patient is a 78-year-old female who presents today for initial annual Medicare wellness visit  All components of the examination were reviewed and completed  Colonoscopy and mammography are current  We need to obtain results of her colonoscopy from [facility] which occurred approximately 2 years ago  She is going to call back with the name of the physician so that we may contact them for results  She has not seen gynecology recently  She did have a total abdominal hysterectomy and Pap smear would not be appropriate for her  She declines further follow-up with gynecology  Cardiovascular screening is current  We are getting lipids today  Blood sugar/diabetes is current with A1c of less than 5 this fall  Colorectal cancer and breast cancer screening is current as noted previously  Patient declines osteoporosis screening  She does take calcium and vitamin-D as well as regular exercise  Glaucoma is current  She sees Dr Frankie Hall again and her intra-ocular pressures have been normal   Hepatitis-C has been negative, HIV declined  We did discuss advanced directives, gave her copy of 5 wishes and freezer pack which she is interested in completing  We discussed immunizations  She currently declines though we did have informed discussion on pneumococcal vaccination in she may change her mind in regard to this             Other Visit Diagnoses     Screening for iron deficiency anemia        Relevant Orders    CBC        Health Maintenance Due   Topic Date Due    Medicare Annual Wellness Visit (AWV)  1950    CRC Screening: Colonoscopy  1950    BMI: Followup Plan  03/02/1967    DTaP,Tdap,and Td Vaccines (1 - Tdap) 03/02/1970    Pneumococcal PPSV23/PCV13 65+ Years / Low and Medium Risk (1 of 2 - PCV13) 03/02/2014    INFLUENZA VACCINE  07/01/2018    MAMMOGRAM  05/01/2019         HPI:  Joshua Wells is a 78 y o  female here for her Initial Wellness Visit  Patient Active Problem List   Diagnosis    Acquired hypothyroidism    Benign essential HTN    Cardiac murmur    Impaired fasting glucose    Obesity (BMI 30-39  9)    Screening breast examination    Dysfunction of left eustachian tube    Medicare annual wellness visit, initial     Past Medical History:   Diagnosis Date    Myalgia 8/3/2017    Right shoulder pain 5/27/2017    Viral upper respiratory tract infection 3/6/2018     Past Surgical History:   Procedure Laterality Date    HYSTERECTOMY       Family History   Problem Relation Age of Onset    No Known Problems Mother     No Known Problems Father     Thyroid disease Sister     Thyroid disease Brother     Thyroid disease Sister     Thyroid disease Sister     Thyroid disease Brother     Thyroid disease Brother      Social History     Tobacco Use   Smoking Status Never Smoker   Smokeless Tobacco Never Used     Social History     Substance and Sexual Activity   Alcohol Use Not on file      Social History     Substance and Sexual Activity   Drug Use Not on file       Current Outpatient Medications   Medication Sig Dispense Refill    levothyroxine 50 mcg tablet Take 1 tablet (50 mcg total) by mouth daily 90 tablet 0    lisinopril-hydrochlorothiazide (PRINZIDE,ZESTORETIC) 20-12 5 MG per tablet Take 1 tablet by mouth daily 90 tablet 0     No current facility-administered medications for this visit  No Known Allergies  There is no immunization history for the selected administration types on file for this patient      Patient Care Team:  Mayte Becker MD as PCP - General (Family Medicine)    Medicare Screening Tests and Risk Assessments:      Health Risk Assessment:  Patient rates overall health as excellent  Patient feels that their physical health rating is Much better  Eyesight was rated as Same  Hearing was rated as Same  Patient feels that their emotional and mental health rating is Same  Pain experienced by patient in the last 7 days has been None  Emotional/Mental Health:  Patient has been feeling nervous/anxious  PHQ-9 Depression Screening:    Frequency of the following problems over the past two weeks:      1  Little interest or pleasure in doing things: 0 - not at all      2  Feeling down, depressed, or hopeless: 0 - not at all  PHQ-2 Score: 0          Broken Bones/Falls: Fall Risk Assessment:    In the past year, patient has experienced: No history of falling in past year          Bladder/Bowel:  Patient has not leaked urine accidently in the last six months  Patient reports no loss of bowel control  Immunizations:  Patient has not had a flu vaccination within the last year  Patient has not received a pneumonia shot  Patient has not received a shingles shot  Patient has not received tetanus/diphtheria shot  Home Safety:  Patient does not have trouble with stairs inside or outside of their home  Patient currently reports that there are no safety hazards present in home, working smoke alarms, working carbon monoxide detectors  Preventative Screenings:   Breast cancer screening performed, 5/1/2018  colon cancer screen completed, cholesterol screen completed, 9/11/2018  glaucoma eye exam completed,     Nutrition:  Current diet: Regular and Limited junk food with servings of the following:    Medications:  Patient is not currently taking any over-the-counter supplements  Patient is able to manage medications  Lifestyle Choices:  Patient reports no tobacco use    Patient has not smoked or used tobacco in the past   Patient reports no alcohol use  Patient drives a vehicle  Patient wears seat belt  Activities of Daily Living:  Can get out of bed by his or her self, able to dress self, able to make own meals, able to do own shopping, able to bathe self, can do own laundry/housekeeping, can manage own money, pay bills and track expenses    Previous Hospitalizations:  No hospitalization or ED visit in past 12 months    Social Support: [place] [address]  [phone]    Preventative Screening/Counseling:      Cardiovascular:      General: Risks and Benefits Discussed      Counseling: Healthy Diet and Improve Exercise Tolerance          Diabetes:      General: Risks and Benefits Discussed      Counseling: Healthy Diet, Healthy Weight and Improve Physical Activity      Comments: A1c was less than 5 this past fall  Colorectal Cancer:      General: Risks and Benefits Discussed and Screening Current      Comments: Had done 2 years ago at [facility]  Breast Cancer:      General: Risks and Benefits Discussed and Screening Current      Comments: 5/18, [facility]        Cervical Cancer:      General: Risks and Benefits Discussed      Comments: Had CRISPIN in past          Osteoporosis:      General: Risks and Benefits Discussed and Patient Declines      Counseling: Calcium and Vitamin D Intake and Regular Weightbearing Exercise      Comments: Patient declines        AAA:      General: Screening Not Indicated          Glaucoma:      General: Risks and Benefits Discussed and Screening Current      Comments: Sees Dr Aspen Landeros  IOP normal          HIV:      General: Risks and Benefits Discussed and Screening Not Indicated          Hepatitis C:      General: Risks and Benefits Discussed and Screening Current        Advanced Directives:   Patient has living will for healthcare, patient has an advanced directive  5 wishes given       Immunizations:      Influenza: Patient Declines      Pneumococcal: Patient Declines

## 2019-03-12 NOTE — ASSESSMENT & PLAN NOTE
The patient is a 76-year-old female who presents today for initial annual Medicare wellness visit  All components of the examination were reviewed and completed  Colonoscopy and mammography are current  We need to obtain results of her colonoscopy from [facility] which occurred approximately 2 years ago  She is going to call back with the name of the physician so that we may contact them for results  She has not seen gynecology recently  She did have a total abdominal hysterectomy and Pap smear would not be appropriate for her  She declines further follow-up with gynecology  Cardiovascular screening is current  We are getting lipids today  Blood sugar/diabetes is current with A1c of less than 5 this fall  Colorectal cancer and breast cancer screening is current as noted previously  Patient declines osteoporosis screening  She does take calcium and vitamin-D as well as regular exercise  Glaucoma is current  She sees Dr Lea Jeffery again and her intra-ocular pressures have been normal   Hepatitis-C has been negative, HIV declined  We did discuss advanced directives, gave her copy of 5 wishes and freezer pack which she is interested in completing  We discussed immunizations  She currently declines though we did have informed discussion on pneumococcal vaccination in she may change her mind in regard to this

## 2019-03-13 LAB
ALBUMIN SERPL-MCNC: 4.6 G/DL (ref 3.6–5.1)
ALBUMIN/GLOB SERPL: 2 (CALC) (ref 1–2.5)
ALP SERPL-CCNC: 78 U/L (ref 33–130)
ALT SERPL-CCNC: 21 U/L (ref 6–29)
AST SERPL-CCNC: 20 U/L (ref 10–35)
BILIRUB SERPL-MCNC: 1.3 MG/DL (ref 0.2–1.2)
BUN SERPL-MCNC: 16 MG/DL (ref 7–25)
BUN/CREAT SERPL: ABNORMAL (CALC) (ref 6–22)
CALCIUM SERPL-MCNC: 9.5 MG/DL (ref 8.6–10.4)
CHLORIDE SERPL-SCNC: 104 MMOL/L (ref 98–110)
CHOLEST SERPL-MCNC: 209 MG/DL
CHOLEST/HDLC SERPL: 3.9 (CALC)
CO2 SERPL-SCNC: 27 MMOL/L (ref 20–32)
CREAT SERPL-MCNC: 0.67 MG/DL (ref 0.6–0.93)
ERYTHROCYTE [DISTWIDTH] IN BLOOD BY AUTOMATED COUNT: 13.7 % (ref 11–15)
GLOBULIN SER CALC-MCNC: 2.3 G/DL (CALC) (ref 1.9–3.7)
GLUCOSE SERPL-MCNC: 109 MG/DL (ref 65–99)
HCT VFR BLD AUTO: 40.9 % (ref 35–45)
HDLC SERPL-MCNC: 54 MG/DL
HGB BLD-MCNC: 13.6 G/DL (ref 11.7–15.5)
LDLC SERPL CALC-MCNC: 129 MG/DL (CALC)
MCH RBC QN AUTO: 30.4 PG (ref 27–33)
MCHC RBC AUTO-ENTMCNC: 33.3 G/DL (ref 32–36)
MCV RBC AUTO: 91.3 FL (ref 80–100)
NONHDLC SERPL-MCNC: 155 MG/DL (CALC)
PLATELET # BLD AUTO: 386 THOUSAND/UL (ref 140–400)
PMV BLD REES-ECKER: 9.8 FL (ref 7.5–12.5)
POTASSIUM SERPL-SCNC: 4.5 MMOL/L (ref 3.5–5.3)
PROT SERPL-MCNC: 6.9 G/DL (ref 6.1–8.1)
RBC # BLD AUTO: 4.48 MILLION/UL (ref 3.8–5.1)
SL AMB EGFR AFRICAN AMERICAN: 103 ML/MIN/1.73M2
SL AMB EGFR NON AFRICAN AMERICAN: 89 ML/MIN/1.73M2
SODIUM SERPL-SCNC: 139 MMOL/L (ref 135–146)
TRIGL SERPL-MCNC: 144 MG/DL
TSH SERPL-ACNC: 1 MIU/L (ref 0.4–4.5)
WBC # BLD AUTO: 8.3 THOUSAND/UL (ref 3.8–10.8)

## 2019-05-24 NOTE — ASSESSMENT & PLAN NOTE
Patient arrives by self for evaluation of vomiting, diarrhea, and abdominal pain. Patient was seen in the ER yesterday and discharged home. Gastritis verses PUD. Negative ultrasound yesterday. Reporting vomiting and diarrhea for the past 2-3 days. Onset of dry-heaving this morning. Continues to have diarrhea. Reporting upper  abdominal pain when vomiting. IV placed and call light within reach.    We discussed working on diet and exercise for weight loss

## 2019-06-14 DIAGNOSIS — I10 ESSENTIAL HYPERTENSION: ICD-10-CM

## 2019-06-14 DIAGNOSIS — Z12.39 BREAST CANCER SCREENING: Primary | ICD-10-CM

## 2019-06-14 DIAGNOSIS — E03.9 HYPOTHYROIDISM, UNSPECIFIED TYPE: ICD-10-CM

## 2019-06-14 RX ORDER — LISINOPRIL AND HYDROCHLOROTHIAZIDE 20; 12.5 MG/1; MG/1
1 TABLET ORAL DAILY
Qty: 90 TABLET | Refills: 0 | Status: SHIPPED | OUTPATIENT
Start: 2019-06-14 | End: 2019-09-12 | Stop reason: SDUPTHER

## 2019-06-14 RX ORDER — LEVOTHYROXINE SODIUM 0.05 MG/1
50 TABLET ORAL DAILY
Qty: 90 TABLET | Refills: 0 | Status: SHIPPED | OUTPATIENT
Start: 2019-06-14 | End: 2019-09-12 | Stop reason: SDUPTHER

## 2019-09-12 DIAGNOSIS — I10 ESSENTIAL HYPERTENSION: ICD-10-CM

## 2019-09-12 DIAGNOSIS — E03.9 HYPOTHYROIDISM, UNSPECIFIED TYPE: ICD-10-CM

## 2019-09-12 RX ORDER — LISINOPRIL AND HYDROCHLOROTHIAZIDE 20; 12.5 MG/1; MG/1
1 TABLET ORAL DAILY
Qty: 90 TABLET | Refills: 0 | Status: SHIPPED | OUTPATIENT
Start: 2019-09-12 | End: 2019-12-16 | Stop reason: SDUPTHER

## 2019-09-12 RX ORDER — LEVOTHYROXINE SODIUM 0.05 MG/1
50 TABLET ORAL DAILY
Qty: 90 TABLET | Refills: 0 | Status: SHIPPED | OUTPATIENT
Start: 2019-09-12 | End: 2019-12-16 | Stop reason: SDUPTHER

## 2019-09-24 ENCOUNTER — OFFICE VISIT (OUTPATIENT)
Dept: FAMILY MEDICINE CLINIC | Facility: CLINIC | Age: 69
End: 2019-09-24
Payer: MEDICARE

## 2019-09-24 VITALS
SYSTOLIC BLOOD PRESSURE: 128 MMHG | OXYGEN SATURATION: 98 % | DIASTOLIC BLOOD PRESSURE: 82 MMHG | HEART RATE: 76 BPM | HEIGHT: 62 IN | BODY MASS INDEX: 35.33 KG/M2 | TEMPERATURE: 98.1 F | WEIGHT: 192 LBS

## 2019-09-24 DIAGNOSIS — E66.9 OBESITY (BMI 30-39.9): ICD-10-CM

## 2019-09-24 DIAGNOSIS — Z13.0 SCREENING FOR IRON DEFICIENCY ANEMIA: ICD-10-CM

## 2019-09-24 DIAGNOSIS — R73.01 IMPAIRED FASTING GLUCOSE: ICD-10-CM

## 2019-09-24 DIAGNOSIS — E03.9 ACQUIRED HYPOTHYROIDISM: ICD-10-CM

## 2019-09-24 DIAGNOSIS — Z12.39 BREAST CANCER SCREENING: ICD-10-CM

## 2019-09-24 DIAGNOSIS — I10 BENIGN ESSENTIAL HTN: ICD-10-CM

## 2019-09-24 PROCEDURE — 99214 OFFICE O/P EST MOD 30 MIN: CPT | Performed by: FAMILY MEDICINE

## 2019-09-24 PROCEDURE — 36415 COLL VENOUS BLD VENIPUNCTURE: CPT | Performed by: FAMILY MEDICINE

## 2019-09-24 NOTE — PROGRESS NOTES
Assessment/Plan:  Acquired hypothyroidism  Continue with levothyroxine  Check TSH today  Impaired fasting glucose  Check hemoglobin A1c  Benign essential HTN  Continue with lisinopril/hydrochlorothiazide, blood pressure well controlled  Check CBC and CMP  Obesity (BMI 30-39  9)  Continued efforts at improved diet and exercise regimen  CBC, CMP, lipids, hemoglobin A1c       Diagnoses and all orders for this visit:    BMI 35 0-35 9,adult    Acquired hypothyroidism    Impaired fasting glucose    Benign essential HTN    Obesity (BMI 30-39  9)          Subjective:   Chief Complaint   Patient presents with    med check     here for 6 mth med check and fbw  bmi f/u plan needed  call for mammo        Patient ID: Ayala Nieves is a 78 y o  female  HPI  The patient is a 50-year-old female who presents today for follow-up of multiple medical problems including essential hypertension, hypothyroidism, impaired fasting glucose as well as obesity  She reports that since her last visit her younger brother, age 61  suddenly  She knew of no medical issues that he had though they were not close  Cause of death is unclear at this time  She states that overall she feels well  She denies any cardiovascular or pulmonary symptomatology  She denies headache diplopia focal neurologic symptom X cetera  She has no GI or  complaint  She continues to be physically active  She is attending yoga  She tries to watch what she eats  The following portions of the patient's history were reviewed and updated as appropriate: allergies, current medications, past family history, past medical history, past social history, past surgical history and problem list     Review of Systems   Constitution: Negative for decreased appetite, malaise/fatigue, weight gain and weight loss  Cardiovascular: Negative for chest pain, irregular heartbeat, leg swelling, orthopnea and paroxysmal nocturnal dyspnea     Respiratory: Negative for cough, shortness of breath and wheezing  Endocrine: Negative for polydipsia, polyphagia and polyuria  Gastrointestinal: Negative for constipation and diarrhea  Neurological: Negative for dizziness and headaches  Psychiatric/Behavioral: Negative for depression  The patient is not nervous/anxious  Objective:    Physical Exam   Constitutional: She is oriented to person, place, and time  She appears well-developed and well-nourished  Obese and in NAD   Neck: No thyromegaly present  Cardiovascular: Normal rate, regular rhythm and normal heart sounds  Pulmonary/Chest: Effort normal and breath sounds normal  No respiratory distress  She has no wheezes  She has no rales  Musculoskeletal: She exhibits no edema  Neurological: She is alert and oriented to person, place, and time  Psychiatric: She has a normal mood and affect  Thought content normal    Nursing note and vitals reviewed  BMI Counseling: Body mass index is 35 12 kg/m²  The BMI is above normal  Nutrition recommendations include reducing portion sizes, 3-5 servings of fruits/vegetables daily, consuming healthier snacks and moderation in carbohydrate intake  Exercise recommendations include exercising 3-5 times per week

## 2019-09-25 LAB
ALBUMIN SERPL-MCNC: 4.6 G/DL (ref 3.6–5.1)
ALBUMIN/GLOB SERPL: 1.9 (CALC) (ref 1–2.5)
ALP SERPL-CCNC: 74 U/L (ref 33–130)
ALT SERPL-CCNC: 22 U/L (ref 6–29)
AST SERPL-CCNC: 18 U/L (ref 10–35)
BILIRUB SERPL-MCNC: 0.9 MG/DL (ref 0.2–1.2)
BUN SERPL-MCNC: 15 MG/DL (ref 7–25)
BUN/CREAT SERPL: NORMAL (CALC) (ref 6–22)
CALCIUM SERPL-MCNC: 9.5 MG/DL (ref 8.6–10.4)
CHLORIDE SERPL-SCNC: 104 MMOL/L (ref 98–110)
CHOLEST SERPL-MCNC: 203 MG/DL
CHOLEST/HDLC SERPL: 3.7 (CALC)
CO2 SERPL-SCNC: 28 MMOL/L (ref 20–32)
CREAT SERPL-MCNC: 0.71 MG/DL (ref 0.6–0.93)
ERYTHROCYTE [DISTWIDTH] IN BLOOD BY AUTOMATED COUNT: 13.7 % (ref 11–15)
GLOBULIN SER CALC-MCNC: 2.4 G/DL (CALC) (ref 1.9–3.7)
GLUCOSE SERPL-MCNC: 98 MG/DL (ref 65–99)
HBA1C MFR BLD: 4.8 % OF TOTAL HGB
HCT VFR BLD AUTO: 41.3 % (ref 35–45)
HDLC SERPL-MCNC: 55 MG/DL
HGB BLD-MCNC: 13.7 G/DL (ref 11.7–15.5)
LDLC SERPL CALC-MCNC: 122 MG/DL (CALC)
MCH RBC QN AUTO: 29.8 PG (ref 27–33)
MCHC RBC AUTO-ENTMCNC: 33.2 G/DL (ref 32–36)
MCV RBC AUTO: 90 FL (ref 80–100)
NONHDLC SERPL-MCNC: 148 MG/DL (CALC)
PLATELET # BLD AUTO: 356 THOUSAND/UL (ref 140–400)
PMV BLD REES-ECKER: 9.2 FL (ref 7.5–12.5)
POTASSIUM SERPL-SCNC: 4.8 MMOL/L (ref 3.5–5.3)
PROT SERPL-MCNC: 7 G/DL (ref 6.1–8.1)
RBC # BLD AUTO: 4.59 MILLION/UL (ref 3.8–5.1)
SL AMB EGFR AFRICAN AMERICAN: 100 ML/MIN/1.73M2
SL AMB EGFR NON AFRICAN AMERICAN: 86 ML/MIN/1.73M2
SODIUM SERPL-SCNC: 141 MMOL/L (ref 135–146)
TRIGL SERPL-MCNC: 147 MG/DL
TSH SERPL-ACNC: 0.96 MIU/L (ref 0.4–4.5)
WBC # BLD AUTO: 8.2 THOUSAND/UL (ref 3.8–10.8)

## 2019-12-16 DIAGNOSIS — I10 ESSENTIAL HYPERTENSION: ICD-10-CM

## 2019-12-16 DIAGNOSIS — E03.9 HYPOTHYROIDISM, UNSPECIFIED TYPE: ICD-10-CM

## 2019-12-16 RX ORDER — LISINOPRIL AND HYDROCHLOROTHIAZIDE 20; 12.5 MG/1; MG/1
1 TABLET ORAL DAILY
Qty: 90 TABLET | Refills: 0 | Status: SHIPPED | OUTPATIENT
Start: 2019-12-16 | End: 2020-03-13

## 2019-12-16 RX ORDER — LEVOTHYROXINE SODIUM 0.05 MG/1
50 TABLET ORAL DAILY
Qty: 90 TABLET | Refills: 0 | Status: SHIPPED | OUTPATIENT
Start: 2019-12-16 | End: 2020-03-13

## 2020-03-13 DIAGNOSIS — I10 ESSENTIAL HYPERTENSION: ICD-10-CM

## 2020-03-13 DIAGNOSIS — E03.9 HYPOTHYROIDISM, UNSPECIFIED TYPE: ICD-10-CM

## 2020-03-13 RX ORDER — LISINOPRIL AND HYDROCHLOROTHIAZIDE 20; 12.5 MG/1; MG/1
TABLET ORAL
Qty: 90 TABLET | Refills: 0 | Status: SHIPPED | OUTPATIENT
Start: 2020-03-13 | End: 2020-06-11 | Stop reason: SDUPTHER

## 2020-03-13 RX ORDER — LISINOPRIL AND HYDROCHLOROTHIAZIDE 20; 12.5 MG/1; MG/1
1 TABLET ORAL DAILY
Qty: 90 TABLET | Refills: 0 | OUTPATIENT
Start: 2020-03-13

## 2020-03-13 RX ORDER — LEVOTHYROXINE SODIUM 0.05 MG/1
50 TABLET ORAL DAILY
Qty: 90 TABLET | Refills: 0 | OUTPATIENT
Start: 2020-03-13

## 2020-03-13 RX ORDER — LEVOTHYROXINE SODIUM 0.05 MG/1
TABLET ORAL
Qty: 90 TABLET | Refills: 0 | Status: SHIPPED | OUTPATIENT
Start: 2020-03-13 | End: 2020-06-11 | Stop reason: SDUPTHER

## 2020-04-02 ENCOUNTER — TELEPHONE (OUTPATIENT)
Dept: FAMILY MEDICINE CLINIC | Facility: CLINIC | Age: 70
End: 2020-04-02

## 2020-04-07 ENCOUNTER — TELEMEDICINE (OUTPATIENT)
Dept: FAMILY MEDICINE CLINIC | Facility: CLINIC | Age: 70
End: 2020-04-07
Payer: MEDICARE

## 2020-04-07 VITALS — HEIGHT: 62 IN | BODY MASS INDEX: 35.12 KG/M2

## 2020-04-07 DIAGNOSIS — E03.9 ACQUIRED HYPOTHYROIDISM: ICD-10-CM

## 2020-04-07 DIAGNOSIS — I10 BENIGN ESSENTIAL HTN: ICD-10-CM

## 2020-04-07 DIAGNOSIS — Z00.00 MEDICARE ANNUAL WELLNESS VISIT, SUBSEQUENT: Primary | ICD-10-CM

## 2020-04-07 DIAGNOSIS — R73.01 IMPAIRED FASTING GLUCOSE: ICD-10-CM

## 2020-04-07 PROCEDURE — 99214 OFFICE O/P EST MOD 30 MIN: CPT | Performed by: FAMILY MEDICINE

## 2020-04-07 PROCEDURE — 1170F FXNL STATUS ASSESSED: CPT | Performed by: FAMILY MEDICINE

## 2020-04-07 PROCEDURE — 1036F TOBACCO NON-USER: CPT | Performed by: FAMILY MEDICINE

## 2020-04-07 PROCEDURE — 3074F SYST BP LT 130 MM HG: CPT | Performed by: FAMILY MEDICINE

## 2020-04-07 PROCEDURE — 1125F AMNT PAIN NOTED PAIN PRSNT: CPT | Performed by: FAMILY MEDICINE

## 2020-04-07 PROCEDURE — 1160F RVW MEDS BY RX/DR IN RCRD: CPT | Performed by: FAMILY MEDICINE

## 2020-04-07 PROCEDURE — G0439 PPPS, SUBSEQ VISIT: HCPCS | Performed by: FAMILY MEDICINE

## 2020-04-07 PROCEDURE — 3079F DIAST BP 80-89 MM HG: CPT | Performed by: FAMILY MEDICINE

## 2020-05-11 DIAGNOSIS — N39.0 ACUTE UTI: Primary | ICD-10-CM

## 2020-05-11 RX ORDER — NITROFURANTOIN 25; 75 MG/1; MG/1
100 CAPSULE ORAL 2 TIMES DAILY
Qty: 10 CAPSULE | Refills: 0 | Status: SHIPPED | OUTPATIENT
Start: 2020-05-11 | End: 2020-05-16

## 2020-06-11 DIAGNOSIS — E03.9 HYPOTHYROIDISM, UNSPECIFIED TYPE: ICD-10-CM

## 2020-06-11 DIAGNOSIS — I10 ESSENTIAL HYPERTENSION: ICD-10-CM

## 2020-06-11 RX ORDER — LISINOPRIL AND HYDROCHLOROTHIAZIDE 20; 12.5 MG/1; MG/1
1 TABLET ORAL DAILY
Qty: 90 TABLET | Refills: 0 | Status: SHIPPED | OUTPATIENT
Start: 2020-06-11 | End: 2020-09-04 | Stop reason: SDUPTHER

## 2020-06-11 RX ORDER — LEVOTHYROXINE SODIUM 0.05 MG/1
50 TABLET ORAL DAILY
Qty: 90 TABLET | Refills: 0 | Status: SHIPPED | OUTPATIENT
Start: 2020-06-11 | End: 2020-09-04 | Stop reason: SDUPTHER

## 2020-07-16 ENCOUNTER — OFFICE VISIT (OUTPATIENT)
Dept: FAMILY MEDICINE CLINIC | Facility: CLINIC | Age: 70
End: 2020-07-16
Payer: MEDICARE

## 2020-07-16 VITALS
TEMPERATURE: 98.4 F | HEIGHT: 62 IN | HEART RATE: 74 BPM | WEIGHT: 184 LBS | BODY MASS INDEX: 33.86 KG/M2 | OXYGEN SATURATION: 99 % | SYSTOLIC BLOOD PRESSURE: 126 MMHG | DIASTOLIC BLOOD PRESSURE: 76 MMHG

## 2020-07-16 DIAGNOSIS — K52.9 GASTROENTERITIS: Primary | ICD-10-CM

## 2020-07-16 PROCEDURE — 99214 OFFICE O/P EST MOD 30 MIN: CPT | Performed by: FAMILY MEDICINE

## 2020-07-16 PROCEDURE — 3074F SYST BP LT 130 MM HG: CPT | Performed by: FAMILY MEDICINE

## 2020-07-16 PROCEDURE — 3008F BODY MASS INDEX DOCD: CPT | Performed by: FAMILY MEDICINE

## 2020-07-16 PROCEDURE — 1036F TOBACCO NON-USER: CPT | Performed by: FAMILY MEDICINE

## 2020-07-16 PROCEDURE — 3078F DIAST BP <80 MM HG: CPT | Performed by: FAMILY MEDICINE

## 2020-07-16 PROCEDURE — 1160F RVW MEDS BY RX/DR IN RCRD: CPT | Performed by: FAMILY MEDICINE

## 2020-07-16 NOTE — ASSESSMENT & PLAN NOTE
The patient has gastrointestinal symptoms which I believe represent gastroenteritis  Could be food related as she had been eating at some restaurants prior to becoming ill  I do not believe this represents manifestation of COVID-19  We gave her some Nexium to try as her upper symptoms are more bothersome than her lower symptoms presently  She is asked push fluids rest and stick to low residue diet  She is in agreement this plan  She is going to call in 24 hours with a report of her progress  She agrees  In the meantime should she develop any progression of her condition she will call sooner seek more urgent medical attention to which she agrees

## 2020-07-16 NOTE — PROGRESS NOTES
Assessment/Plan:  Gastroenteritis  The patient has gastrointestinal symptoms which I believe represent gastroenteritis  Could be food related as she had been eating at some restaurants prior to becoming ill  I do not believe this represents manifestation of COVID-19  We gave her some Nexium to try as her upper symptoms are more bothersome than her lower symptoms presently  She is asked push fluids rest and stick to low residue diet  She is in agreement this plan  She is going to call in 24 hours with a report of her progress  She agrees  In the meantime should she develop any progression of her condition she will call sooner seek more urgent medical attention to which she agrees  Diagnoses and all orders for this visit:    Gastroenteritis          Subjective:   Chief Complaint   Patient presents with    GI Problem     nausea, diarrhea, heart burn with belching      I started with diarrhea on Monday  Today I have nausea and heartburn  I stopped eating  I feel like I could throw up  Eructation  She sits in the car when  goes in stores and he social distances  Brown and watery  No heme or pus  Had a normal BM this am then turned watery  No fever, cough , SOB, anosmia  No URI sx  No rash  Avery Orozco at [facility], etc         Patient ID: Aajy Mendieta is a 69 y o  female  HPI  The patient is a 76-year-old female with a history of hypertension and hypothyroidism as well as obesity and impaired fasting glucose who presents today with concerns over gastrointestinal symptoms which began Monday  She states that she was feeling well  Earlier she had eaten from [facility]  She began with diarrhea  Was brown and watery  No blood or pus  She subsequently developed nausea and heartburn with a feeling that she could vomit though she did not vomit  She had significant eructation as well  Since then she has tried to stop eating solid food    This morning she had a relatively normal bowel movement but then later in the day it turned watery  She has had no fever, chest pain, cough or shortness of breath  She has had no anosmia  She has had no upper respiratory symptoms  She has had no tick bite or rash  She has eaten at multiple other places over the last several days prior to discontinuing solid food  Her 's been well  She always wears a mask in social distances  He goes into stores but again he wears a mask and social distances  She has not been aware of any exposure to COVID-19  She does have a history of diverticulitis  Her pain is not left lower quadrant  She has no fever  She she has no blood in her stools or pus in her stools  The following portions of the patient's history were reviewed and updated as appropriate: allergies, current medications, past family history, past medical history, past social history, past surgical history and problem list     Review of Systems   Constitution: Negative  She has lost several lb but this was through diet prior to becoming ill   HENT: Negative  Eyes: Negative for blurred vision  Cardiovascular: Negative for chest pain, irregular heartbeat, leg swelling and orthopnea  Respiratory: Negative for cough, shortness of breath and wheezing  Endocrine: Negative for polydipsia, polyphagia and polyuria  Hematologic/Lymphatic: Negative  Skin: Negative for rash  Gastrointestinal: Positive for diarrhea, heartburn and nausea  Negative for bloating, abdominal pain, bowel incontinence, dysphagia, excessive appetite, flatus, hematemesis, hematochezia, melena and vomiting  Genitourinary: Negative for bladder incontinence, hematuria and urgency  Neurological: Negative for dizziness, headaches and light-headedness  Psychiatric/Behavioral: Negative for depression  The patient is nervous/anxious  The patient does not have insomnia            Objective:    Physical Exam   Constitutional: She is oriented to person, place, and time  She appears well-developed and well-nourished  No distress  HENT:   Mouth/Throat: Oropharynx is clear and moist  No oropharyngeal exudate  Eyes: Right eye exhibits no discharge  Left eye exhibits no discharge  No scleral icterus  Cardiovascular: Normal rate, regular rhythm and normal heart sounds  Pulmonary/Chest: Effort normal and breath sounds normal  No respiratory distress  She has no wheezes  She has no rales  Abdominal: Soft  Mildly hyperactive bowel sounds  The abdomen is soft, nondistended and without mass or organomegaly  Musculoskeletal: She exhibits no edema  Lymphadenopathy:     She has no cervical adenopathy  Neurological: She is alert and oriented to person, place, and time  Psychiatric: Her behavior is normal  Judgment and thought content normal    She is relatively anxious but this is her baseline   Nursing note and vitals reviewed        Wt Readings from Last 12 Encounters:   07/16/20 83 5 kg (184 lb)   09/24/19 87 1 kg (192 lb)   03/12/19 84 4 kg (186 lb)   01/26/19 84 4 kg (186 lb)   12/04/18 85 7 kg (189 lb)   09/11/18 86 6 kg (191 lb)   03/06/18 89 4 kg (197 lb)   07/07/17 87 1 kg (192 lb)   07/03/17 87 1 kg (192 lb)   05/27/17 88 kg (194 lb)   05/02/17 88 kg (194 lb)   11/15/16 88 kg (194 lb)   ]

## 2020-07-19 ENCOUNTER — HOSPITAL ENCOUNTER (EMERGENCY)
Facility: HOSPITAL | Age: 70
Discharge: HOME | End: 2020-07-19
Attending: EMERGENCY MEDICINE | Admitting: EMERGENCY MEDICINE
Payer: MEDICARE

## 2020-07-19 ENCOUNTER — NURSE TRIAGE (OUTPATIENT)
Dept: OTHER | Facility: OTHER | Age: 70
End: 2020-07-19

## 2020-07-19 ENCOUNTER — APPOINTMENT (EMERGENCY)
Dept: RADIOLOGY | Facility: HOSPITAL | Age: 70
End: 2020-07-19
Payer: MEDICARE

## 2020-07-19 VITALS
OXYGEN SATURATION: 98 % | TEMPERATURE: 97.2 F | HEART RATE: 73 BPM | DIASTOLIC BLOOD PRESSURE: 70 MMHG | RESPIRATION RATE: 20 BRPM | BODY MASS INDEX: 33.13 KG/M2 | SYSTOLIC BLOOD PRESSURE: 158 MMHG | HEIGHT: 62 IN | WEIGHT: 180 LBS

## 2020-07-19 DIAGNOSIS — R10.9 ABDOMINAL PAIN: Primary | ICD-10-CM

## 2020-07-19 DIAGNOSIS — R11.0 NAUSEA: ICD-10-CM

## 2020-07-19 DIAGNOSIS — R19.7 DIARRHEA: ICD-10-CM

## 2020-07-19 LAB
ALBUMIN SERPL BCP-MCNC: 4.1 G/DL (ref 3.5–5)
ALP SERPL-CCNC: 81 U/L (ref 46–116)
ALT SERPL W P-5'-P-CCNC: 24 U/L (ref 12–78)
ANION GAP SERPL CALCULATED.3IONS-SCNC: 6 MMOL/L (ref 4–13)
AST SERPL W P-5'-P-CCNC: 18 U/L (ref 5–45)
BACTERIA UR QL AUTO: ABNORMAL /HPF
BASOPHILS # BLD AUTO: 0.07 THOUSANDS/ΜL (ref 0–0.1)
BASOPHILS NFR BLD AUTO: 1 % (ref 0–1)
BILIRUB SERPL-MCNC: 1.5 MG/DL (ref 0.2–1)
BILIRUB UR QL STRIP: NEGATIVE
BUN SERPL-MCNC: 11 MG/DL (ref 5–25)
CALCIUM SERPL-MCNC: 8.8 MG/DL (ref 8.3–10.1)
CHLORIDE SERPL-SCNC: 101 MMOL/L (ref 100–108)
CLARITY UR: CLEAR
CO2 SERPL-SCNC: 30 MMOL/L (ref 21–32)
COLOR UR: YELLOW
CREAT SERPL-MCNC: 0.86 MG/DL (ref 0.6–1.3)
EOSINOPHIL # BLD AUTO: 0.01 THOUSAND/ΜL (ref 0–0.61)
EOSINOPHIL NFR BLD AUTO: 0 % (ref 0–6)
ERYTHROCYTE [DISTWIDTH] IN BLOOD BY AUTOMATED COUNT: 13.9 % (ref 11.6–15.1)
GFR SERPL CREATININE-BSD FRML MDRD: 68 ML/MIN/1.73SQ M
GLUCOSE SERPL-MCNC: 101 MG/DL (ref 65–140)
GLUCOSE UR STRIP-MCNC: NEGATIVE MG/DL
HCT VFR BLD AUTO: 38.1 % (ref 34.8–46.1)
HGB BLD-MCNC: 13.4 G/DL (ref 11.5–15.4)
HGB UR QL STRIP.AUTO: ABNORMAL
IMM GRANULOCYTES # BLD AUTO: 0.03 THOUSAND/UL (ref 0–0.2)
IMM GRANULOCYTES NFR BLD AUTO: 0 % (ref 0–2)
KETONES UR STRIP-MCNC: ABNORMAL MG/DL
LEUKOCYTE ESTERASE UR QL STRIP: ABNORMAL
LIPASE SERPL-CCNC: 128 U/L (ref 73–393)
LYMPHOCYTES # BLD AUTO: 1.53 THOUSANDS/ΜL (ref 0.6–4.47)
LYMPHOCYTES NFR BLD AUTO: 15 % (ref 14–44)
MAGNESIUM SERPL-MCNC: 2.2 MG/DL (ref 1.6–2.6)
MCH RBC QN AUTO: 31.2 PG (ref 26.8–34.3)
MCHC RBC AUTO-ENTMCNC: 35.2 G/DL (ref 31.4–37.4)
MCV RBC AUTO: 89 FL (ref 82–98)
MONOCYTES # BLD AUTO: 0.59 THOUSAND/ΜL (ref 0.17–1.22)
MONOCYTES NFR BLD AUTO: 6 % (ref 4–12)
NEUTROPHILS # BLD AUTO: 7.99 THOUSANDS/ΜL (ref 1.85–7.62)
NEUTS SEG NFR BLD AUTO: 78 % (ref 43–75)
NITRITE UR QL STRIP: NEGATIVE
NON-SQ EPI CELLS URNS QL MICRO: ABNORMAL /HPF
NRBC BLD AUTO-RTO: 0 /100 WBCS
PH UR STRIP.AUTO: 6.5 [PH]
PLATELET # BLD AUTO: 359 THOUSANDS/UL (ref 149–390)
PMV BLD AUTO: 9.3 FL (ref 8.9–12.7)
POTASSIUM SERPL-SCNC: 4.2 MMOL/L (ref 3.5–5.3)
PROT SERPL-MCNC: 7.4 G/DL (ref 6.4–8.2)
PROT UR STRIP-MCNC: NEGATIVE MG/DL
RBC # BLD AUTO: 4.29 MILLION/UL (ref 3.81–5.12)
RBC #/AREA URNS AUTO: ABNORMAL /HPF
SODIUM SERPL-SCNC: 137 MMOL/L (ref 136–145)
SP GR UR STRIP.AUTO: <=1.005 (ref 1–1.03)
UROBILINOGEN UR QL STRIP.AUTO: 0.2 E.U./DL
WBC # BLD AUTO: 10.22 THOUSAND/UL (ref 4.31–10.16)
WBC #/AREA URNS AUTO: ABNORMAL /HPF

## 2020-07-19 PROCEDURE — 83690 ASSAY OF LIPASE: CPT | Performed by: EMERGENCY MEDICINE

## 2020-07-19 PROCEDURE — 36415 COLL VENOUS BLD VENIPUNCTURE: CPT | Performed by: EMERGENCY MEDICINE

## 2020-07-19 PROCEDURE — 85025 COMPLETE CBC W/AUTO DIFF WBC: CPT | Performed by: EMERGENCY MEDICINE

## 2020-07-19 PROCEDURE — 74177 CT ABD & PELVIS W/CONTRAST: CPT

## 2020-07-19 PROCEDURE — 99284 EMERGENCY DEPT VISIT MOD MDM: CPT

## 2020-07-19 PROCEDURE — 83735 ASSAY OF MAGNESIUM: CPT | Performed by: EMERGENCY MEDICINE

## 2020-07-19 PROCEDURE — 81001 URINALYSIS AUTO W/SCOPE: CPT | Performed by: EMERGENCY MEDICINE

## 2020-07-19 PROCEDURE — 87086 URINE CULTURE/COLONY COUNT: CPT | Performed by: EMERGENCY MEDICINE

## 2020-07-19 PROCEDURE — 99284 EMERGENCY DEPT VISIT MOD MDM: CPT | Performed by: EMERGENCY MEDICINE

## 2020-07-19 PROCEDURE — 80053 COMPREHEN METABOLIC PANEL: CPT | Performed by: EMERGENCY MEDICINE

## 2020-07-19 PROCEDURE — 96360 HYDRATION IV INFUSION INIT: CPT

## 2020-07-19 RX ORDER — DICYCLOMINE HCL 20 MG
20 TABLET ORAL 2 TIMES DAILY
Qty: 20 TABLET | Refills: 0 | Status: SHIPPED | OUTPATIENT
Start: 2020-07-19 | End: 2020-08-17

## 2020-07-19 RX ORDER — ONDANSETRON 4 MG/1
4 TABLET, FILM COATED ORAL EVERY 6 HOURS
Qty: 9 TABLET | Refills: 0 | Status: SHIPPED | OUTPATIENT
Start: 2020-07-19 | End: 2020-08-17

## 2020-07-19 RX ADMIN — SODIUM CHLORIDE 1000 ML: 0.9 INJECTION, SOLUTION INTRAVENOUS at 11:36

## 2020-07-19 RX ADMIN — IOHEXOL 100 ML: 350 INJECTION, SOLUTION INTRAVENOUS at 12:17

## 2020-07-19 NOTE — TELEPHONE ENCOUNTER
Called and spoke to patient  Patient stated that she is still not feeling better since seeing Dr Soy Bledsoe  Still has heartburn, nausea, diarrhea, and now lower back pain  Patient stated, "I just don't feel like myself   I'm going to go to [facility] "

## 2020-07-19 NOTE — DISCHARGE INSTRUCTIONS
Please follow up with your family doctor  Please follow up with GI if your diarrhea persists  Please drink plenty of fluids and keep yourself hydrated  Please avoid gas producing foods such as broccoli and beans  Please get outpatient stool testing like c diff colitis, and stool culture as given prescriptions in this week  Return for worsening abdominal pain,nausea,vomiting, bloody diarrhea to the ED

## 2020-07-19 NOTE — ED PROVIDER NOTES
History  Chief Complaint   Patient presents with    Diarrhea     Patient saw PCP last week and was given nexium for GERD,but patient states it is not helping and she is having diarrhea,nausea with no vomiting,and pain to her lower back    Nausea     68 y/o female presents with diarrhea multiple episodes for the past few days, non bloody associated with reflux, however no vomiting, abdominal pain,fevers,chills,dysuria, urgency or frequency  No other symptoms  No chest pain,dyspnea, cough,congestion or any other symptoms  now has pain in her lower back  Denies sick contacts, travel history, recent antibiotics  Says she ate chipotle which started the symptoms  History provided by:  Patient   used: No        Prior to Admission Medications   Prescriptions Last Dose Informant Patient Reported? Taking?   esomeprazole (NexIUM) 20 mg capsule   Yes Yes   Sig: Take 20 mg by mouth every morning before breakfast   levothyroxine 50 mcg tablet   No Yes   Sig: Take 1 tablet (50 mcg total) by mouth daily   lisinopril-hydrochlorothiazide (PRINZIDE,ZESTORETIC) 20-12 5 MG per tablet   No Yes   Sig: Take 1 tablet by mouth daily      Facility-Administered Medications: None       Past Medical History:   Diagnosis Date    Myalgia 8/3/2017    Right shoulder pain 5/27/2017    Viral upper respiratory tract infection 3/6/2018       Past Surgical History:   Procedure Laterality Date    HYSTERECTOMY         Family History   Problem Relation Age of Onset    No Known Problems Mother     No Known Problems Father     Thyroid disease Sister     Thyroid disease Brother     Thyroid disease Sister     Thyroid disease Sister     Thyroid disease Brother     Thyroid disease Brother      I have reviewed and agree with the history as documented      E-Cigarette/Vaping    E-Cigarette Use Never User      E-Cigarette/Vaping Substances    Nicotine No     THC No     CBD No     Flavoring No     Other No     Unknown No Social History     Tobacco Use    Smoking status: Never Smoker    Smokeless tobacco: Never Used   Substance Use Topics    Alcohol use: Never     Frequency: Never    Drug use: Never       Review of Systems   Constitutional: Negative  HENT: Negative  Eyes: Negative  Respiratory: Negative  Cardiovascular: Negative  Gastrointestinal: Positive for diarrhea and nausea  Negative for abdominal pain, blood in stool and vomiting  Endocrine: Negative  Genitourinary: Negative  Musculoskeletal: Negative  Skin: Negative  Allergic/Immunologic: Negative  Neurological: Negative  Hematological: Negative  Psychiatric/Behavioral: Negative  All other systems reviewed and are negative  Physical Exam  Physical Exam   Constitutional: She is oriented to person, place, and time  She appears well-developed and well-nourished  HENT:   Head: Normocephalic and atraumatic  Eyes: Pupils are equal, round, and reactive to light  EOM are normal    Neck: Normal range of motion  Neck supple  Cardiovascular: Normal rate and regular rhythm  Pulmonary/Chest: Effort normal and breath sounds normal    Abdominal: Soft  Bowel sounds are normal  She exhibits no distension and no mass  There is no tenderness  There is no rebound and no guarding  No hernia  Musculoskeletal: Normal range of motion  Neurological: She is alert and oriented to person, place, and time  Skin: Skin is warm and dry  Psychiatric: She has a normal mood and affect  Nursing note and vitals reviewed        Vital Signs  ED Triage Vitals [07/19/20 0958]   Temperature Pulse Respirations Blood Pressure SpO2   (!) 97 2 °F (36 2 °C) 96 20 (!) 184/84 98 %      Temp Source Heart Rate Source Patient Position - Orthostatic VS BP Location FiO2 (%)   Tympanic Monitor Sitting Right arm --      Pain Score       --           Vitals:    07/19/20 0958 07/19/20 1340   BP: (!) 184/84 158/70   Pulse: 96 73   Patient Position - Orthostatic VS: Sitting Lying         Visual Acuity      ED Medications  Medications   sodium chloride 0 9 % bolus 1,000 mL (0 mL Intravenous Stopped 7/19/20 1236)   iohexol (OMNIPAQUE) 350 MG/ML injection (MULTI-DOSE) 100 mL (100 mL Intravenous Given 7/19/20 1217)       Diagnostic Studies  Results Reviewed     Procedure Component Value Units Date/Time    Urine culture [[id number]] Collected:  07/19/20 1134    Lab Status:  Preliminary result Specimen:  Urine, Other Updated:  07/20/20 1007     Urine Culture Culture too young- will reincubate    Comprehensive metabolic panel [[id number]]  (Abnormal) Collected:  07/19/20 1133    Lab Status:  Final result Specimen:  Blood from Arm, Left Updated:  07/19/20 1157     Sodium 137 mmol/L      Potassium 4 2 mmol/L      Chloride 101 mmol/L      CO2 30 mmol/L      ANION GAP 6 mmol/L      BUN 11 mg/dL      Creatinine 0 86 mg/dL      Glucose 101 mg/dL      Calcium 8 8 mg/dL      AST 18 U/L      ALT 24 U/L      Alkaline Phosphatase 81 U/L      Total Protein 7 4 g/dL      Albumin 4 1 g/dL      Total Bilirubin 1 50 mg/dL      eGFR 68 ml/min/1 73sq m     Narrative:       [place] guidelines for Chronic Kidney Disease (CKD):     Stage 1 with normal or high GFR (GFR > 90 mL/min/1 73 square meters)    Stage 2 Mild CKD (GFR = 60-89 mL/min/1 73 square meters)    Stage 3A Moderate CKD (GFR = 45-59 mL/min/1 73 square meters)    Stage 3B Moderate CKD (GFR = 30-44 mL/min/1 73 square meters)    Stage 4 Severe CKD (GFR = 15-29 mL/min/1 73 square meters)    Stage 5 End Stage CKD (GFR <15 mL/min/1 73 square meters)  Note: GFR calculation is accurate only with a steady state creatinine    Magnesium [[id number]]  (Normal) Collected:  07/19/20 1133    Lab Status:  Final result Specimen:  Blood from Arm, Left Updated:  07/19/20 1157     Magnesium 2 2 mg/dL     Lipase [[id number]]  (Normal) Collected:  07/19/20 1133    Lab Status:  Final result Specimen:  Blood from Arm, Left Updated: 07/19/20 1157     Lipase 128 u/L     Urinalysis with microscopic [[id number]]  (Abnormal) Collected:  07/19/20 1134    Lab Status:  Final result Specimen:  Urine, Other Updated:  07/19/20 1150     Clarity, UA Clear     Color, UA Yellow     Specific Gravity, UA <=1 005     pH, UA 6 5     Glucose, UA Negative mg/dl      Ketones, UA Trace mg/dl      Blood, UA Trace-Intact     Protein, UA Negative mg/dl      Nitrite, UA Negative     Bilirubin, UA Negative     Urobilinogen, UA 0 2 E U /dl      Leukocytes, UA Large     WBC, UA 10-20 /hpf      RBC, UA None Seen /hpf      Bacteria, UA Occasional /hpf      Epithelial Cells Occasional /hpf     CBC and differential [[id number]]  (Abnormal) Collected:  07/19/20 1133    Lab Status:  Final result Specimen:  Blood from Arm, Left Updated:  07/19/20 1141     WBC 10 22 Thousand/uL      RBC 4 29 Million/uL      Hemoglobin 13 4 g/dL      Hematocrit 38 1 %      MCV 89 fL      MCH 31 2 pg      MCHC 35 2 g/dL      RDW 13 9 %      MPV 9 3 fL      Platelets 959 Thousands/uL      nRBC 0 /100 WBCs      Neutrophils Relative 78 %      Immat GRANS % 0 %      Lymphocytes Relative 15 %      Monocytes Relative 6 %      Eosinophils Relative 0 %      Basophils Relative 1 %      Neutrophils Absolute 7 99 Thousands/µL      Immature Grans Absolute 0 03 Thousand/uL      Lymphocytes Absolute 1 53 Thousands/µL      Monocytes Absolute 0 59 Thousand/µL      Eosinophils Absolute 0 01 Thousand/µL      Basophils Absolute 0 07 Thousands/µL     Stool Enteric Bacterial Panel by PCR [[id number]]     Lab Status:  No result Specimen:  Stool     Clostridium difficile toxin by PCR with EIA [[id number]]     Lab Status:  No result Specimen:  Stool                  CT abdomen pelvis with contrast   Final Result by Roxanne Gu MD (07/19 6878)      1  No acute findings in the abdomen or pelvis  2   Incidentally noted are scattered hepatic cysts some of which are quite large measuring up to 12 4 cm in size  Workstation performed: [id number]                    Procedures  Procedures         ED Course       US AUDIT      Most Recent Value   Initial Alcohol Screen: US AUDIT-C    1  How often do you have a drink containing alcohol?  0 Filed at: 07/19/2020 0959   2  How many drinks containing alcohol do you have on a typical day you are drinking? 0 Filed at: 07/19/2020 0959   3a  Male UNDER 65: How often do you have five or more drinks on one occasion? 0 Filed at: 07/19/2020 0959   3b  FEMALE Any Age, or MALE 65+: How often do you have 4 or more drinks on one occassion? 0 Filed at: 07/19/2020 0959   Audit-C Score  0 Filed at: 07/19/2020 0959                  JARAD/DAST-10      Most Recent Value   How many times in the past year have you    Used an illegal drug or used a prescription medication for non-medical reasons? Never Filed at: 07/19/2020 0959                                MDM  Number of Diagnoses or Management Options  Abdominal pain:   Diarrhea:   Nausea:   Diagnosis management comments: Patient evaluated with labs, UA, imaging  I reviewed the results and discussed them with the patient  Patient discharged with appropriate instructions medications, prescriptions for outpatient stool testing and follow-up  Patient verbalized understanding had no further questions at the time of discharge  Patient had stable vital signs and well-appearing at the time of discharge         Amount and/or Complexity of Data Reviewed  Clinical lab tests: ordered and reviewed  Tests in the radiology section of CPT®: reviewed and ordered  Tests in the medicine section of CPT®: ordered and reviewed    Patient Progress  Patient progress: stable        Disposition  Final diagnoses:   Abdominal pain   Nausea   Diarrhea     Time reflects when diagnosis was documented in both MDM as applicable and the Disposition within this note     Time User Action Codes Description Comment    7/19/2020  1:18 PM Jolene Alexis Add [R10 9] Abdominal pain     7/19/2020  1:18 PM AnepuSohaila Add [R11 0] Nausea     7/19/2020  1:18 PM AnepuChadNathalia Add [R19 7] Diarrhea       ED Disposition     ED Disposition Condition Date/Time Comment    Discharge Stable [place] Jul 19, 2020  1:18 PM Nikia Krishnan discharge to home/self care  Follow-up Information     Follow up With Specialties Details Why Contact Info Additional Information    Veneda Bernheim, MD Family Medicine Schedule an appointment as soon as possible for a visit   [facility] Emergency Department Emergency Medicine  If symptoms worsen [address] [facility] ED, [facility] ED, [address] [address], [place], [place]  Merrill Stewart MD Gastroenterology   [place] [address]  [phone]             Discharge Medication List as of 7/19/2020  1:31 PM      START taking these medications    Details   dicyclomine (BENTYL) 20 mg tablet Take 1 tablet (20 mg total) by mouth 2 (two) times a day, Starting Sun 7/19/2020, Print      ondansetron (ZOFRAN) 4 mg tablet Take 1 tablet (4 mg total) by mouth every 6 (six) hours for 3 days, Starting Sun 7/19/2020, Until Wed 7/22/2020, Print         CONTINUE these medications which have NOT CHANGED    Details   esomeprazole (NexIUM) 20 mg capsule Take 20 mg by mouth every morning before breakfast, Historical Med      levothyroxine 50 mcg tablet Take 1 tablet (50 mcg total) by mouth daily, Starting u 6/11/2020, Normal      lisinopril-hydrochlorothiazide (PRINZIDE,ZESTORETIC) 20-12 5 MG per tablet Take 1 tablet by mouth daily, Starting Thu 6/11/2020, Normal           Outpatient Discharge Orders   Stool Enteric Bacterial Panel by PCR   Standing Status: Future Standing Exp  Date: 07/19/21     Clostridium difficile toxin by PCR with EIA   Standing Status: Future Standing Exp   Date: 07/19/21       PDMP Review     None          ED Provider  Electronically Signed by           Juan Carlos Chopra,   07/20/20 1144

## 2020-07-19 NOTE — TELEPHONE ENCOUNTER
Regarding: GI Symptoms   ----- Message from Calixto Gomez sent at 7/19/2020  8:47 AM EDT -----  Patient  Called c/o nausea, heartburn, diarrhea, and pain across lower back  Patient saw her PCP the other day for this problem and gave her Nexium  Patient believes she is getting worse and the medication is not helping  Please call patient back

## 2020-07-21 LAB — BACTERIA UR CULT: NORMAL

## 2020-07-31 ENCOUNTER — OFFICE VISIT (OUTPATIENT)
Dept: FAMILY MEDICINE CLINIC | Facility: CLINIC | Age: 70
End: 2020-07-31
Payer: MEDICARE

## 2020-07-31 VITALS
WEIGHT: 183 LBS | HEART RATE: 73 BPM | TEMPERATURE: 98.3 F | SYSTOLIC BLOOD PRESSURE: 122 MMHG | BODY MASS INDEX: 33.68 KG/M2 | OXYGEN SATURATION: 98 % | DIASTOLIC BLOOD PRESSURE: 76 MMHG | HEIGHT: 62 IN

## 2020-07-31 DIAGNOSIS — K52.9 GASTROENTERITIS: ICD-10-CM

## 2020-07-31 DIAGNOSIS — K76.89 SIMPLE HEPATIC CYST: ICD-10-CM

## 2020-07-31 DIAGNOSIS — Z12.31 ENCOUNTER FOR SCREENING MAMMOGRAM FOR MALIGNANT NEOPLASM OF BREAST: Primary | ICD-10-CM

## 2020-07-31 PROCEDURE — 99214 OFFICE O/P EST MOD 30 MIN: CPT | Performed by: FAMILY MEDICINE

## 2020-07-31 PROCEDURE — 3008F BODY MASS INDEX DOCD: CPT | Performed by: FAMILY MEDICINE

## 2020-07-31 PROCEDURE — 1160F RVW MEDS BY RX/DR IN RCRD: CPT | Performed by: FAMILY MEDICINE

## 2020-07-31 PROCEDURE — 3074F SYST BP LT 130 MM HG: CPT | Performed by: FAMILY MEDICINE

## 2020-07-31 PROCEDURE — 3078F DIAST BP <80 MM HG: CPT | Performed by: FAMILY MEDICINE

## 2020-07-31 PROCEDURE — 1036F TOBACCO NON-USER: CPT | Performed by: FAMILY MEDICINE

## 2020-07-31 NOTE — PROGRESS NOTES
Assessment/Plan:  Gastroenteritis  The patient's upper GI symptoms have resolved  She discontinued Nexium  She continues to have frequent loose stools  She has no blood or pus  CT the abdomen revealed only simple hepatic cysts without acute abnormality  We did discuss today potentially getting C diff as well as routine bacterial studies  She has no fever no purulence no abdominal pain would like to hold off on this until GI evaluation next week  In the meantime she is going to push fluids and call if she notes any progressive symptoms especially blood in her stool, purulence, fever X cetera  She agrees  Simple hepatic cyst  Patient had large hepatic cyst noted on CT scan  It was causing mass effect on gallbladder  I wonder if it may possibly be causing biliary outflow obstruction as a source of her diarrhea though she has no LFT abnormality  Will await GI evaluation  We spent 25 minutes reviewing the patient's history, ER evaluation and studies with her  Diagnoses and all orders for this visit:    Encounter for screening mammogram for malignant neoplasm of breast  -     Mammo screening bilateral w 3d & cad; Future    Gastroenteritis    Simple hepatic cyst          Subjective:   Chief Complaint   Patient presents with    Follow-up     pt here for a recheck after being seen in the er for abdominal pain  Indigestion improved but not diarrhea  No history of same  Feeling anxious  No change in color, heme  Loose after the first two of day  No bloating, heme, or fever  No Nexium  Patient ID: Kym Azevedo is a 69 y o  female  HPI  The patient is a 25-year-old female who was initially seen in this office on July 16th, 2 weeks ago  She had eaten at a [place] and was concerned that she had a possible food related gastroenteritis  She was diagnosed with gastroenteritis and given some Nexium as her upper gastrointestinal symptoms were predominant    She states that the Nexium seemed to improve it but she developed lower GI symptoms including diarrhea which was frequent  Three days later on July 19th she presented to the emergency room for further evaluation  At that point she had a CT abdomen pelvis  It showed no acute process  She did have some large hepatic cysts, 1 of which was 12 cm or greater and causing mass effect on her gallbladder  Since then she has continued to have up to 6 bowel movements a day  She states that the 1st 2 seem pretty normal but then rest are loose  There is no blood or pus  She has no significant abdominal pain  She has no upper GI symptoms  She has no fevers chills or loss of appetite  She had no weight loss or other constitutional symptom  We noted in the emergency room the stool studies were ordered but she states she did not have a specimen collected and she was not given any requisition for same  The following portions of the patient's history were reviewed and updated as appropriate: allergies, current medications, past family history, past medical history, past social history, past surgical history and problem list     Review of Systems   Constitution: Negative  Cardiovascular: Negative  Respiratory: Negative  Skin: Negative for itching and rash  Gastrointestinal: Positive for change in bowel habit and diarrhea  Negative for bloating, abdominal pain, anorexia, constipation, flatus, heartburn, hematochezia, nausea and vomiting  Objective:    Physical Exam   Constitutional: She is oriented to person, place, and time  She appears well-developed  Overweight and in no distress overweight   Neck: No JVD present  Cardiovascular: Normal rate  Pulmonary/Chest: Effort normal    Abdominal: Soft  Bowel sounds are normal  She exhibits no mass  There is no tenderness  There is no guarding  Musculoskeletal: She exhibits no edema  Neurological: She is alert and oriented to person, place, and time     Psychiatric: Her behavior is normal  Judgment and thought content normal    Anxious appearance   Nursing note and vitals reviewed

## 2020-07-31 NOTE — ASSESSMENT & PLAN NOTE
The patient's upper GI symptoms have resolved  She discontinued Nexium  She continues to have frequent loose stools  She has no blood or pus  CT the abdomen revealed only simple hepatic cysts without acute abnormality  We did discuss today potentially getting C diff as well as routine bacterial studies  She has no fever no purulence no abdominal pain would like to hold off on this until GI evaluation next week  In the meantime she is going to push fluids and call if she notes any progressive symptoms especially blood in her stool, purulence, fever X cetera  She agrees

## 2020-07-31 NOTE — ASSESSMENT & PLAN NOTE
Patient had large hepatic cyst noted on CT scan  It was causing mass effect on gallbladder  I wonder if it may possibly be causing biliary outflow obstruction as a source of her diarrhea though she has no LFT abnormality  Will await GI evaluation

## 2020-08-04 ENCOUNTER — PREP FOR PROCEDURE (OUTPATIENT)
Dept: GASTROENTEROLOGY | Facility: CLINIC | Age: 70
End: 2020-08-04

## 2020-08-04 ENCOUNTER — OFFICE VISIT (OUTPATIENT)
Dept: GASTROENTEROLOGY | Facility: CLINIC | Age: 70
End: 2020-08-04
Payer: MEDICARE

## 2020-08-04 VITALS
HEART RATE: 80 BPM | HEIGHT: 62 IN | TEMPERATURE: 97.6 F | WEIGHT: 179 LBS | BODY MASS INDEX: 32.94 KG/M2 | RESPIRATION RATE: 18 BRPM

## 2020-08-04 DIAGNOSIS — R19.7 DIARRHEA, UNSPECIFIED TYPE: ICD-10-CM

## 2020-08-04 DIAGNOSIS — Z20.822 ENCOUNTER FOR LABORATORY TESTING FOR COVID-19 VIRUS: ICD-10-CM

## 2020-08-04 DIAGNOSIS — K52.9 GASTROENTERITIS: Primary | ICD-10-CM

## 2020-08-04 DIAGNOSIS — K76.89 SIMPLE HEPATIC CYST: ICD-10-CM

## 2020-08-04 PROCEDURE — 99204 OFFICE O/P NEW MOD 45 MIN: CPT | Performed by: INTERNAL MEDICINE

## 2020-08-04 RX ORDER — SODIUM, POTASSIUM,MAG SULFATES 17.5-3.13G
1 SOLUTION, RECONSTITUTED, ORAL ORAL ONCE
Qty: 1 BOTTLE | Refills: 0 | Status: SHIPPED | OUTPATIENT
Start: 2020-08-04 | End: 2020-09-04 | Stop reason: ALTCHOICE

## 2020-08-04 NOTE — LETTER
August 4, 2020     Cristina Palmer MD  [address] [facility] [place] [address]    Patient: Galina Herrmann   YOB: 1950   Date of Visit: 8/4/2020       Dear Dr Mark Bashir:    Thank you for referring Galina Herrmann to me for evaluation  Below are my notes for this consultation  If you have questions, please do not hesitate to call me  I look forward to following your patient along with you  Sincerely,        Dionisio Natarajan MD        CC: No Recipients  Dionisio Natarajan MD  8/4/2020  4:05 PM  Sign when Signing Visit  Consultation - [address] [facility]  Galina Herrmann 69 y o  female MRN: [id number]          Assessment & Plan:    Pleasant 76-year-old female with acute onset of nausea, vomiting, diarrhea with diarrheal symptoms which have persisted, incidentally found to have large hepatic cysts upon presentation to the emergency room  1  Acute onset nausea, vomiting, diarrhea:  Likely infectious gastroenteritis with some persistent symptoms  -we will check extensive stool studies  -given her history will proceed with colonoscopy to exclude underlying pathology 1 stool studies have been returned  -clearly if she has any infectious etiology will treat that 1st and postpone her colonoscopy  -discussed with her the risks of the procedure including bleeding, surgery, perforation  -if she continues to have reflux symptoms we can consider upper endoscopy the time of her colonoscopy    2   Abnormal CT scan with hepatic cysts:  -these appear to be simple benign cysts  -we will check a right upper quadrant ultrasound, pending results of ultrasound we can consider checking an MRI  -will refer the patient to Surgical Oncology for additional expert opinion however typically these can be monitored with imaging for surveillance alone if no concerning findings  -tried to reassure the patient             _____________________________________________________________        CC:  Hepatic cysts    HPI:  Galina Herrmann is a MD Carolina Rea MA; Sana Roche LPN; Shira Moreno MA; Vincent Groves MA   Caller: Unspecified (Today, 10:52 AM)             I called the patient no answer, no voice mail set up.     Hiram Loomis MD         69 y o female who was referred for evaluation of hepatic cysts  As you know this is a pleasant 50-year-old female, otherwise in good health, she notes that in early July started to have acute onset of diarrhea associated with nausea, abdominal pain, belching, bloating  Her symptoms had worsened, she presented to the emergency room which time she was found to have 2 large incidental hepatic cysts with some extrinsic compression on her gallbladder  Laboratory studies demonstrated normal LFTs, she had mild elevation of white count  Since then her diarrhea had mildly improved she has been taking antidiarrheals  She reports now having continued loose stools but of less frequency, apparently having 5 to 6 bowel movements per day, no blood or mucus  Her abdominal bloating has improved, still having some belching but overall nausea is improved  She is able to tolerate her diet  She did have upwards of 10 bowel movements at that time with nocturnal diarrhea  Denies any recent antibiotics, sick contacts  Denies any fevers or chills  Patient's last colonoscopy was approximately 6 years ago and reports that she had a bad experience with incomplete and difficult colonoscopy at that time  Surgical history is notable for hysterectomy, appendectomy  She denies any tobacco or alcohol  She is retired, family history is negative for GI or associated malignancies  ROS:  The remainder of the ROS was negative except for the pertinent positives mentioned in HPI  Allergies: Patient has no known allergies      Medications:   Current Outpatient Medications:     levothyroxine 50 mcg tablet, Take 1 tablet (50 mcg total) by mouth daily, Disp: 90 tablet, Rfl: 0    lisinopril-hydrochlorothiazide (PRINZIDE,ZESTORETIC) 20-12 5 MG per tablet, Take 1 tablet by mouth daily, Disp: 90 tablet, Rfl: 0    dicyclomine (BENTYL) 20 mg tablet, Take 1 tablet (20 mg total) by mouth 2 (two) times a day (Patient not taking: Reported on 7/31/2020), Disp: 20 tablet, Rfl: 0    esomeprazole (NexIUM) 20 mg capsule, Take 20 mg by mouth every morning before breakfast, Disp: , Rfl:     Na Sulfate-K Sulfate-Mg Sulf (Suprep Bowel Prep Kit) 17 5-3 13-1 6 GM/177ML SOLN, Take 1 Bottle by mouth once for 1 dose, Disp: 1 Bottle, Rfl: 0    ondansetron (ZOFRAN) 4 mg tablet, Take 1 tablet (4 mg total) by mouth every 6 (six) hours for 3 days, Disp: 9 tablet, Rfl: 0'    Past Medical History:   Diagnosis Date    Myalgia 8/3/2017    Right shoulder pain 5/27/2017    Viral upper respiratory tract infection 3/6/2018       Past Surgical History:   Procedure Laterality Date    HYSTERECTOMY         Family History   Problem Relation Age of Onset    No Known Problems Mother     No Known Problems Father     Thyroid disease Sister     Thyroid disease Brother     Thyroid disease Sister     Thyroid disease Sister     Thyroid disease Brother     Thyroid disease Brother         reports that she has never smoked  She has never used smokeless tobacco  She reports that she does not drink alcohol or use drugs            Physical Exam:     Pulse 80   Temp 97 6 °F (36 4 °C) (Tympanic)   Resp 18   Ht 5' 2"   Wt 81 2 kg (179 lb)   BMI 32 74 kg/m²     Gen: wn/wd, NAD, healthy-appearing female  HEENT: anicteric, MMM, no cervical LAD  CVS: RRR, no m/r/g  CHEST: CTA b/l  ABD: +BS, soft, NT,ND, no hepatosplenomegaly, no appreciable masses  EXT: no c/c/e  NEURO: aaox3  SKIN: NO rashes

## 2020-08-05 ENCOUNTER — TELEPHONE (OUTPATIENT)
Dept: SURGICAL ONCOLOGY | Facility: CLINIC | Age: 70
End: 2020-08-05

## 2020-08-05 ENCOUNTER — APPOINTMENT (OUTPATIENT)
Dept: LAB | Facility: CLINIC | Age: 70
End: 2020-08-05
Payer: MEDICARE

## 2020-08-05 DIAGNOSIS — R19.7 DIARRHEA, UNSPECIFIED TYPE: ICD-10-CM

## 2020-08-05 PROCEDURE — 87209 SMEAR COMPLEX STAIN: CPT

## 2020-08-05 PROCEDURE — 87505 NFCT AGENT DETECTION GI: CPT

## 2020-08-05 PROCEDURE — 87493 C DIFF AMPLIFIED PROBE: CPT

## 2020-08-05 PROCEDURE — 87046 STOOL CULTR AEROBIC BACT EA: CPT

## 2020-08-05 PROCEDURE — 87177 OVA AND PARASITES SMEARS: CPT

## 2020-08-05 NOTE — TELEPHONE ENCOUNTER
Spoke to pt about referral to surg  Onc  She is going to have some additional testing done and then if need be, she will call us to schedule

## 2020-08-06 LAB
C DIFF TOX GENS STL QL NAA+PROBE: NEGATIVE
CAMPYLOBACTER DNA SPEC NAA+PROBE: NORMAL
SALMONELLA DNA SPEC QL NAA+PROBE: NORMAL
SHIGA TOXIN STX GENE SPEC NAA+PROBE: NORMAL
SHIGELLA DNA SPEC QL NAA+PROBE: NORMAL

## 2020-08-07 LAB — YERSINIA STL CULT: NORMAL

## 2020-08-08 LAB — O+P STL CONC: NORMAL

## 2020-08-10 LAB — VIBRIO STL CULT: NORMAL

## 2020-08-11 ENCOUNTER — HOSPITAL ENCOUNTER (OUTPATIENT)
Dept: RADIOLOGY | Facility: HOSPITAL | Age: 70
Discharge: HOME | End: 2020-08-11
Attending: INTERNAL MEDICINE
Payer: MEDICARE

## 2020-08-11 DIAGNOSIS — K76.89 SIMPLE HEPATIC CYST: ICD-10-CM

## 2020-08-11 PROCEDURE — 76705 ECHO EXAM OF ABDOMEN: CPT

## 2020-08-12 DIAGNOSIS — Z20.822 ENCOUNTER FOR LABORATORY TESTING FOR COVID-19 VIRUS: ICD-10-CM

## 2020-08-12 PROCEDURE — U0003 INFECTIOUS AGENT DETECTION BY NUCLEIC ACID (DNA OR RNA); SEVERE ACUTE RESPIRATORY SYNDROME CORONAVIRUS 2 (SARS-COV-2) (CORONAVIRUS DISEASE [COVID-19]), AMPLIFIED PROBE TECHNIQUE, MAKING USE OF HIGH THROUGHPUT TECHNOLOGIES AS DESCRIBED BY CMS-2020-01-R: HCPCS | Performed by: INTERNAL MEDICINE

## 2020-08-13 LAB — SARS-COV-2 RNA SPEC QL NAA+PROBE: NOT DETECTED

## 2020-08-17 NOTE — PRE-PROCEDURE INSTRUCTIONS
Pre-Surgery Instructions:   Medication Instructions    levothyroxine 50 mcg tablet Instructed patient per Anesthesia Guidelines   lisinopril-hydrochlorothiazide (PRINZIDE,ZESTORETIC) 20-12 5 MG per tablet Instructed patient per Anesthesia Guidelines

## 2020-08-18 ENCOUNTER — HOSPITAL ENCOUNTER (OUTPATIENT)
Dept: GASTROENTEROLOGY | Facility: AMBULARY SURGERY CENTER | Age: 70
Setting detail: OUTPATIENT SURGERY
Discharge: HOME | End: 2020-08-18
Attending: INTERNAL MEDICINE
Payer: MEDICARE

## 2020-08-18 ENCOUNTER — ANESTHESIA (OUTPATIENT)
Dept: GASTROENTEROLOGY | Facility: AMBULARY SURGERY CENTER | Age: 70
End: 2020-08-18

## 2020-08-18 ENCOUNTER — ANESTHESIA EVENT (OUTPATIENT)
Dept: GASTROENTEROLOGY | Facility: AMBULARY SURGERY CENTER | Age: 70
End: 2020-08-18

## 2020-08-18 VITALS
RESPIRATION RATE: 16 BRPM | SYSTOLIC BLOOD PRESSURE: 152 MMHG | OXYGEN SATURATION: 97 % | HEIGHT: 62 IN | TEMPERATURE: 96.4 F | DIASTOLIC BLOOD PRESSURE: 80 MMHG | HEART RATE: 67 BPM | BODY MASS INDEX: 33.13 KG/M2 | WEIGHT: 180 LBS

## 2020-08-18 DIAGNOSIS — K52.9 GASTROENTERITIS: ICD-10-CM

## 2020-08-18 DIAGNOSIS — R19.7 DIARRHEA, UNSPECIFIED TYPE: ICD-10-CM

## 2020-08-18 PROCEDURE — 88305 TISSUE EXAM BY PATHOLOGIST: CPT | Performed by: PATHOLOGY

## 2020-08-18 PROCEDURE — 45380 COLONOSCOPY AND BIOPSY: CPT | Performed by: INTERNAL MEDICINE

## 2020-08-18 RX ORDER — PROPOFOL 10 MG/ML
INJECTION, EMULSION INTRAVENOUS CONTINUOUS PRN
Status: DISCONTINUED | OUTPATIENT
Start: 2020-08-18 | End: 2020-08-18

## 2020-08-18 RX ORDER — SODIUM CHLORIDE, SODIUM LACTATE, POTASSIUM CHLORIDE, CALCIUM CHLORIDE 600; 310; 30; 20 MG/100ML; MG/100ML; MG/100ML; MG/100ML
125 INJECTION, SOLUTION INTRAVENOUS CONTINUOUS
Status: DISCONTINUED | OUTPATIENT
Start: 2020-08-18 | End: 2020-08-22 | Stop reason: HOSPADM

## 2020-08-18 RX ORDER — GLYCOPYRROLATE 0.2 MG/ML
INJECTION INTRAMUSCULAR; INTRAVENOUS AS NEEDED
Status: DISCONTINUED | OUTPATIENT
Start: 2020-08-18 | End: 2020-08-18

## 2020-08-18 RX ORDER — PROPOFOL 10 MG/ML
INJECTION, EMULSION INTRAVENOUS AS NEEDED
Status: DISCONTINUED | OUTPATIENT
Start: 2020-08-18 | End: 2020-08-18

## 2020-08-18 RX ADMIN — SODIUM CHLORIDE, SODIUM LACTATE, POTASSIUM CHLORIDE, AND CALCIUM CHLORIDE 125 ML/HR: .6; .31; .03; .02 INJECTION, SOLUTION INTRAVENOUS at 09:05

## 2020-08-18 RX ADMIN — GLYCOPYRROLATE 0.2 MG: 0.2 INJECTION, SOLUTION INTRAMUSCULAR; INTRAVENOUS at 09:59

## 2020-08-18 RX ADMIN — PROPOFOL 80 MG: 10 INJECTION, EMULSION INTRAVENOUS at 09:55

## 2020-08-18 RX ADMIN — PROPOFOL 130 MCG/KG/MIN: 10 INJECTION, EMULSION INTRAVENOUS at 09:55

## 2020-08-18 NOTE — ANESTHESIA PREPROCEDURE EVALUATION
Procedure:  COLONOSCOPY    Relevant Problems   CARDIO   (+) Benign essential HTN   (+) Cardiac murmur      ENDO   (+) Acquired hypothyroidism      GI/HEPATIC   (+) Simple hepatic cyst        Physical Exam    Airway    Mallampati score: II  TM Distance: >3 FB  Neck ROM: full     Dental   No notable dental hx     Cardiovascular  Cardiovascular exam normal    Pulmonary  Pulmonary exam normal     Other Findings        Anesthesia Plan  ASA Score- 2     Anesthesia Type- IV sedation with anesthesia with ASA Monitors  Additional Monitors:   Airway Plan:           Plan Factors-Exercise tolerance (METS): >4 METS  Chart reviewed  Imaging results reviewed  Existing labs reviewed  Patient summary reviewed  Induction-     Postoperative Plan-     Informed Consent- Anesthetic plan and risks discussed with patient  I personally reviewed this patient with the CRNA  Discussed and agreed on the Anesthesia Plan with the CRNA  Angel Mejia

## 2020-08-18 NOTE — H&P
History and Physical -  [facility]  Rock Zamudio 69 y o  female MRN: [id number]    HPI: Rock Zamudio is a 69y o  year old female who presents with subacute diarrhea  Review of Systems    Historical Information   Past Medical History:   Diagnosis Date    Disease of thyroid gland     Hypertension     Myalgia 8/3/2017    Right shoulder pain 5/27/2017    Viral upper respiratory tract infection 3/6/2018     Past Surgical History:   Procedure Laterality Date    HYSTERECTOMY       Social History   Social History     Substance and Sexual Activity   Alcohol Use Never    Frequency: Never     Social History     Substance and Sexual Activity   Drug Use Never     Social History     Tobacco Use   Smoking Status Never Smoker   Smokeless Tobacco Never Used     Family History   Problem Relation Age of Onset    No Known Problems Mother     No Known Problems Father     Thyroid disease Sister     Thyroid disease Brother     Thyroid disease Sister     Thyroid disease Sister     Thyroid disease Brother     Thyroid disease Brother        Meds/Allergies     (Not in a hospital admission)      No Known Allergies    Objective     /66   Pulse 78   Temp (!) 96 4 °F (35 8 °C) (Temporal)   Resp 18   Ht 5' 2" (1 575 m)   Wt 81 6 kg (180 lb)   SpO2 100%   BMI 32 92 kg/m²       PHYSICAL EXAM    Gen: NAD  CV: RRR  CHEST: Clear  ABD: soft, NT/ND  EXT: no edema  Neuro: AAO      ASSESSMENT/PLAN:  This is a 69y o  year old female here for subacute diarrhea         PLAN:   Procedure: colonoscopy

## 2020-08-26 ENCOUNTER — TELEPHONE (OUTPATIENT)
Dept: GASTROENTEROLOGY | Facility: CLINIC | Age: 70
End: 2020-08-26

## 2020-08-26 NOTE — TELEPHONE ENCOUNTER
----- Message from Joie Villaseñor MD sent at 8/16/2020 12:24 PM EDT -----  Please inform the patient that all of her stool studies were negative and did not show any signs of infection  Please find out if she continues to have reflux symptoms if she does I would like to schedule an upper endoscopy the same time as her colonoscopy  Please make sure she has the scope scheduled

## 2020-09-01 ENCOUNTER — HOSPITAL ENCOUNTER (OUTPATIENT)
Dept: RADIOLOGY | Facility: HOSPITAL | Age: 70
Discharge: HOME | End: 2020-09-01
Payer: MEDICARE

## 2020-09-01 ENCOUNTER — TRANSCRIBE ORDERS (OUTPATIENT)
Dept: ADMINISTRATIVE | Facility: HOSPITAL | Age: 70
End: 2020-09-01

## 2020-09-01 VITALS — WEIGHT: 180 LBS | HEIGHT: 62 IN | BODY MASS INDEX: 33.13 KG/M2

## 2020-09-01 DIAGNOSIS — Z12.31 ENCOUNTER FOR SCREENING MAMMOGRAM FOR MALIGNANT NEOPLASM OF BREAST: ICD-10-CM

## 2020-09-01 PROCEDURE — 77063 BREAST TOMOSYNTHESIS BI: CPT

## 2020-09-01 PROCEDURE — 77067 SCR MAMMO BI INCL CAD: CPT

## 2020-09-04 ENCOUNTER — OFFICE VISIT (OUTPATIENT)
Dept: FAMILY MEDICINE CLINIC | Facility: CLINIC | Age: 70
End: 2020-09-04
Payer: MEDICARE

## 2020-09-04 VITALS
OXYGEN SATURATION: 98 % | HEART RATE: 74 BPM | BODY MASS INDEX: 32.94 KG/M2 | DIASTOLIC BLOOD PRESSURE: 72 MMHG | TEMPERATURE: 98.1 F | HEIGHT: 62 IN | WEIGHT: 179 LBS | SYSTOLIC BLOOD PRESSURE: 122 MMHG

## 2020-09-04 DIAGNOSIS — Z13.0 SCREENING FOR IRON DEFICIENCY ANEMIA: ICD-10-CM

## 2020-09-04 DIAGNOSIS — E03.9 HYPOTHYROIDISM, UNSPECIFIED TYPE: ICD-10-CM

## 2020-09-04 DIAGNOSIS — Z13.220 ENCOUNTER FOR SCREENING FOR LIPID DISORDER: ICD-10-CM

## 2020-09-04 DIAGNOSIS — I10 ESSENTIAL HYPERTENSION: ICD-10-CM

## 2020-09-04 DIAGNOSIS — R73.01 IMPAIRED FASTING GLUCOSE: Primary | ICD-10-CM

## 2020-09-04 DIAGNOSIS — E66.9 OBESITY (BMI 30-39.9): ICD-10-CM

## 2020-09-04 PROBLEM — K52.9 GASTROENTERITIS: Status: RESOLVED | Noted: 2020-07-16 | Resolved: 2020-09-04

## 2020-09-04 PROCEDURE — 99214 OFFICE O/P EST MOD 30 MIN: CPT | Performed by: FAMILY MEDICINE

## 2020-09-04 RX ORDER — LISINOPRIL AND HYDROCHLOROTHIAZIDE 20; 12.5 MG/1; MG/1
1 TABLET ORAL DAILY
Qty: 90 TABLET | Refills: 1 | Status: SHIPPED | OUTPATIENT
Start: 2020-09-04 | End: 2021-03-04 | Stop reason: SDUPTHER

## 2020-09-04 RX ORDER — LEVOTHYROXINE SODIUM 0.05 MG/1
50 TABLET ORAL DAILY
Qty: 90 TABLET | Refills: 1 | Status: SHIPPED | OUTPATIENT
Start: 2020-09-04 | End: 2021-03-04 | Stop reason: SDUPTHER

## 2020-09-04 NOTE — ASSESSMENT & PLAN NOTE
Continued efforts at improved diet exercise for weight loss  Congratulated for her 14 lb weight loss from last year

## 2020-09-04 NOTE — ASSESSMENT & PLAN NOTE
Continued efforts at diet for weight loss    Congratulated for her 14 lb weight loss since this time last year of

## 2020-09-04 NOTE — PROGRESS NOTES
Assessment/Plan:  Essential hypertension  Blood pressure well controlled today  No change in therapy  Hypothyroidism  Appears euthyroid  Continue with levothyroxine  Recheck 6 months  Impaired fasting glucose  Continued efforts at diet for weight loss  Congratulated for her 14 lb weight loss since this time last year of    Obesity (BMI 30-39  9)  Continued efforts at improved diet exercise for weight loss  Congratulated for her 14 lb weight loss from last year  Diagnoses and all orders for this visit:    Impaired fasting glucose    Essential hypertension  -     lisinopril-hydrochlorothiazide (PRINZIDE,ZESTORETIC) 20-12 5 MG per tablet; Take 1 tablet by mouth daily    Hypothyroidism, unspecified type  -     levothyroxine 50 mcg tablet; Take 1 tablet (50 mcg total) by mouth daily    Obesity (BMI 30-39  9)          Subjective:   Chief Complaint   Patient presents with    med check     pt here for 6m med check and fbw        Patient ID: Wang Villaseñor is a 69 y o  female  HPI  Patient is a 65-year-old female presents today for routine follow-up of multiple medical problems including hypertension, hypothyroidism, obesity as well as history of impaired fasting glucose  She states that she has been doing fairly well other than her anxiety over the current pandemic situation  Her 's restaurant open this morning for the 1st time in months  She has been watching her diet  She has lost 14 lb since this time last year which we congratulated her for  She has had no cardiovascular pulmonary complaint  No headache diplopia or focal neurologic symptom  Her abdominal pain and diarrhea resolved  She had a colonoscopy which we reviewed  She had stool studies as well  She continues to believe it was related to eating a bad meal at a restaurant        The following portions of the patient's history were reviewed and updated as appropriate: allergies, current medications, past family history, past medical history, past social history, past surgical history and problem list     Review of Systems   Constitution: Positive for weight loss  Negative for decreased appetite and malaise/fatigue  Cardiovascular: Negative  Respiratory: Negative  Endocrine: Negative  Hematologic/Lymphatic: Negative  Gastrointestinal:        All symptoms resolved   Genitourinary: Negative  Neurological: Negative  Psychiatric/Behavioral: Negative for depression and suicidal ideas  The patient has insomnia and is nervous/anxious  Objective:    Physical Exam   Constitutional: She is oriented to person, place, and time  She appears well-developed  Overweight in no distress   Neck: No JVD present  No thyromegaly present  Cardiovascular: Normal rate and regular rhythm  Pulmonary/Chest: Effort normal and breath sounds normal  No respiratory distress  She has no wheezes  She has no rales  Musculoskeletal:         General: No edema  Lymphadenopathy:     She has no cervical adenopathy  Neurological: She is alert and oriented to person, place, and time  Skin:   Skin is dry but hair and nails seems good  Psychiatric:   Anxious appearance   Nursing note and vitals reviewed        Wt Readings from Last 12 Encounters:   09/04/20 81 2 kg (179 lb)   09/01/20 81 6 kg (180 lb)   08/18/20 81 6 kg (180 lb)   08/04/20 81 2 kg (179 lb)   07/31/20 83 kg (183 lb)   07/19/20 81 6 kg (180 lb)   07/16/20 83 5 kg (184 lb)   09/24/19 87 1 kg (192 lb)   03/12/19 84 4 kg (186 lb)   01/26/19 84 4 kg (186 lb)   12/04/18 85 7 kg (189 lb)   09/11/18 86 6 kg (191 lb)   ]

## 2020-09-05 LAB
ALBUMIN SERPL-MCNC: 4.4 G/DL (ref 3.6–5.1)
ALBUMIN/GLOB SERPL: 1.8 (CALC) (ref 1–2.5)
ALP SERPL-CCNC: 82 U/L (ref 37–153)
ALT SERPL-CCNC: 33 U/L (ref 6–29)
AST SERPL-CCNC: 27 U/L (ref 10–35)
BILIRUB SERPL-MCNC: 1.1 MG/DL (ref 0.2–1.2)
BUN SERPL-MCNC: 11 MG/DL (ref 7–25)
BUN/CREAT SERPL: ABNORMAL (CALC) (ref 6–22)
CALCIUM SERPL-MCNC: 10.2 MG/DL (ref 8.6–10.4)
CHLORIDE SERPL-SCNC: 105 MMOL/L (ref 98–110)
CHOLEST SERPL-MCNC: 186 MG/DL
CHOLEST/HDLC SERPL: 3.4 (CALC)
CO2 SERPL-SCNC: 25 MMOL/L (ref 20–32)
CREAT SERPL-MCNC: 0.77 MG/DL (ref 0.6–0.93)
ERYTHROCYTE [DISTWIDTH] IN BLOOD BY AUTOMATED COUNT: 13.8 % (ref 11–15)
GLOBULIN SER CALC-MCNC: 2.4 G/DL (CALC) (ref 1.9–3.7)
GLUCOSE SERPL-MCNC: 99 MG/DL (ref 65–99)
HBA1C MFR BLD: 4.7 % OF TOTAL HGB
HCT VFR BLD AUTO: 41.7 % (ref 35–45)
HDLC SERPL-MCNC: 55 MG/DL
HGB BLD-MCNC: 13.6 G/DL (ref 11.7–15.5)
LDLC SERPL CALC-MCNC: 104 MG/DL (CALC)
MCH RBC QN AUTO: 30.1 PG (ref 27–33)
MCHC RBC AUTO-ENTMCNC: 32.6 G/DL (ref 32–36)
MCV RBC AUTO: 92.3 FL (ref 80–100)
NONHDLC SERPL-MCNC: 131 MG/DL (CALC)
PLATELET # BLD AUTO: 309 THOUSAND/UL (ref 140–400)
PMV BLD REES-ECKER: 9.5 FL (ref 7.5–12.5)
POTASSIUM SERPL-SCNC: 4.5 MMOL/L (ref 3.5–5.3)
PROT SERPL-MCNC: 6.8 G/DL (ref 6.1–8.1)
RBC # BLD AUTO: 4.52 MILLION/UL (ref 3.8–5.1)
SL AMB EGFR AFRICAN AMERICAN: 90 ML/MIN/1.73M2
SL AMB EGFR NON AFRICAN AMERICAN: 78 ML/MIN/1.73M2
SODIUM SERPL-SCNC: 141 MMOL/L (ref 135–146)
TRIGL SERPL-MCNC: 153 MG/DL
TSH SERPL-ACNC: 1.18 MIU/L (ref 0.4–4.5)
WBC # BLD AUTO: 5.9 THOUSAND/UL (ref 3.8–10.8)

## 2020-10-05 ENCOUNTER — IMMUNIZATIONS (OUTPATIENT)
Dept: FAMILY MEDICINE CLINIC | Facility: CLINIC | Age: 70
End: 2020-10-05
Payer: MEDICARE

## 2020-10-05 DIAGNOSIS — Z23 ENCOUNTER FOR IMMUNIZATION: ICD-10-CM

## 2020-10-05 PROCEDURE — 90662 IIV NO PRSV INCREASED AG IM: CPT

## 2020-10-05 PROCEDURE — G0008 ADMIN INFLUENZA VIRUS VAC: HCPCS

## 2021-03-04 ENCOUNTER — OFFICE VISIT (OUTPATIENT)
Dept: FAMILY MEDICINE CLINIC | Facility: CLINIC | Age: 71
End: 2021-03-04
Payer: MEDICARE

## 2021-03-04 VITALS
TEMPERATURE: 97.5 F | DIASTOLIC BLOOD PRESSURE: 82 MMHG | SYSTOLIC BLOOD PRESSURE: 132 MMHG | BODY MASS INDEX: 34.32 KG/M2 | HEIGHT: 62 IN | WEIGHT: 186.5 LBS

## 2021-03-04 DIAGNOSIS — R73.01 IMPAIRED FASTING GLUCOSE: ICD-10-CM

## 2021-03-04 DIAGNOSIS — I10 ESSENTIAL HYPERTENSION: ICD-10-CM

## 2021-03-04 DIAGNOSIS — E03.9 HYPOTHYROIDISM, UNSPECIFIED TYPE: Primary | ICD-10-CM

## 2021-03-04 DIAGNOSIS — Z13.0 SCREENING FOR IRON DEFICIENCY ANEMIA: ICD-10-CM

## 2021-03-04 DIAGNOSIS — E66.9 OBESITY (BMI 30-39.9): ICD-10-CM

## 2021-03-04 PROCEDURE — 99214 OFFICE O/P EST MOD 30 MIN: CPT | Performed by: FAMILY MEDICINE

## 2021-03-04 RX ORDER — LEVOTHYROXINE SODIUM 0.05 MG/1
50 TABLET ORAL DAILY
Qty: 90 TABLET | Refills: 1 | Status: SHIPPED | OUTPATIENT
Start: 2021-03-04 | End: 2021-08-30 | Stop reason: SDUPTHER

## 2021-03-04 RX ORDER — LISINOPRIL AND HYDROCHLOROTHIAZIDE 20; 12.5 MG/1; MG/1
1 TABLET ORAL DAILY
Qty: 90 TABLET | Refills: 1 | Status: SHIPPED | OUTPATIENT
Start: 2021-03-04 | End: 2021-08-30 | Stop reason: SDUPTHER

## 2021-03-04 NOTE — ASSESSMENT & PLAN NOTE
Blood pressure well controlled today  Continue with lisinopril/hydrochlorothiazide  Get a CBC CMP and lipids today 
Continue observation  No change noted today 
Recent hemoglobin A1cs have been sub 5 
She appears euthyroid  She will continue with levothyroxine  Will get a TSH today 
She did gain some weight over the winter  We asked her to work on improved diet exercise regimen when the weather allows 
negative...

## 2021-03-04 NOTE — PROGRESS NOTES
BMI Counseling: Body mass index is 34 11 kg/m²  The BMI is above normal  Nutrition recommendations include decreasing portion sizes, encouraging healthy choices of fruits and vegetables, consuming healthier snacks, limiting drinks that contain sugar and moderation in carbohydrate intake  Exercise recommendations include moderate physical activity 150 minutes/week and exercising 3-5 times per week  No pharmacotherapy was ordered  Assessment/Plan:  Hypothyroidism  She appears euthyroid  She will continue with levothyroxine  Will get a TSH today  Impaired fasting glucose  Recent hemoglobin A1cs have been sub 5  Essential hypertension  Blood pressure well controlled today  Continue with lisinopril/hydrochlorothiazide  Get a CBC CMP and lipids today  Obesity (BMI 30-39  9)  She did gain some weight over the winter  We asked her to work on improved diet exercise regimen when the weather allows  Cardiac murmur  Continue observation  No change noted today  Diagnoses and all orders for this visit:    Hypothyroidism, unspecified type  -     TSH, 3rd generation  -     levothyroxine 50 mcg tablet; Take 1 tablet (50 mcg total) by mouth daily    Obesity (BMI 30-39 9)  -     Lipid panel    Impaired fasting glucose  -     Comprehensive metabolic panel    Essential hypertension  -     Comprehensive metabolic panel  -     lisinopril-hydrochlorothiazide (PRINZIDE,ZESTORETIC) 20-12 5 MG per tablet; Take 1 tablet by mouth daily    Screening for iron deficiency anemia  -     CBC          Subjective:   Chief Complaint   Patient presents with    Follow-up     6 mo med check        Patient ID: Deep Delvalle is a 66 y o  female  Had Covid vaccine  I feel great  My ankle swelling is down  HPI  The patient is a 59-year-old female presents today for routine follow-up of multiple medical problems including essential hypertension, hypothyroidism, impaired fasting glucose in addition to obesity    She states that she feels great  She has no cardiovascular pulmonary complaint  She states her ankle swelling has improved  She has no PND orthopnea or edema  The following portions of the patient's history were reviewed and updated as appropriate: allergies, current medications, past family history, past medical history, past social history, past surgical history and problem list     Review of Systems   All other systems reviewed and are negative  Objective:    Physical Exam   Constitutional: She is oriented to person, place, and time  She appears well-developed  Overweight in no distress   Eyes: Right eye exhibits no discharge  Left eye exhibits no discharge  No scleral icterus  Neck: Neck supple  No JVD present  No thyromegaly present  Cardiovascular: Normal rate and regular rhythm  Murmur heard  Pulmonary/Chest: Effort normal and breath sounds normal  No respiratory distress  She has no rales  Musculoskeletal:         General: No edema  Lymphadenopathy:     She has no cervical adenopathy  Neurological: She is alert and oriented to person, place, and time  Psychiatric: Judgment and thought content normal    Mildly anxious appearance   Nursing note and vitals reviewed

## 2021-03-05 LAB
ALBUMIN SERPL-MCNC: 4.3 G/DL (ref 3.6–5.1)
ALBUMIN/GLOB SERPL: 1.7 (CALC) (ref 1–2.5)
ALP SERPL-CCNC: 75 U/L (ref 37–153)
ALT SERPL-CCNC: 17 U/L (ref 6–29)
AST SERPL-CCNC: 18 U/L (ref 10–35)
BILIRUB SERPL-MCNC: 1.4 MG/DL (ref 0.2–1.2)
BUN SERPL-MCNC: 15 MG/DL (ref 7–25)
BUN/CREAT SERPL: ABNORMAL (CALC) (ref 6–22)
CALCIUM SERPL-MCNC: 9 MG/DL (ref 8.6–10.4)
CHLORIDE SERPL-SCNC: 105 MMOL/L (ref 98–110)
CHOLEST SERPL-MCNC: 203 MG/DL
CHOLEST/HDLC SERPL: 3.6 (CALC)
CO2 SERPL-SCNC: 21 MMOL/L (ref 20–32)
CREAT SERPL-MCNC: 0.7 MG/DL (ref 0.6–0.93)
ERYTHROCYTE [DISTWIDTH] IN BLOOD BY AUTOMATED COUNT: 14.2 % (ref 11–15)
GLOBULIN SER CALC-MCNC: 2.6 G/DL (CALC) (ref 1.9–3.7)
GLUCOSE SERPL-MCNC: 98 MG/DL (ref 65–99)
HCT VFR BLD AUTO: 40 % (ref 35–45)
HDLC SERPL-MCNC: 57 MG/DL
HGB BLD-MCNC: 13.2 G/DL (ref 11.7–15.5)
LDLC SERPL CALC-MCNC: 123 MG/DL (CALC)
MCH RBC QN AUTO: 31.3 PG (ref 27–33)
MCHC RBC AUTO-ENTMCNC: 33 G/DL (ref 32–36)
MCV RBC AUTO: 94.8 FL (ref 80–100)
NONHDLC SERPL-MCNC: 146 MG/DL (CALC)
PLATELET # BLD AUTO: 403 THOUSAND/UL (ref 140–400)
PMV BLD REES-ECKER: 9.8 FL (ref 7.5–12.5)
POTASSIUM SERPL-SCNC: 4.3 MMOL/L (ref 3.5–5.3)
PROT SERPL-MCNC: 6.9 G/DL (ref 6.1–8.1)
RBC # BLD AUTO: 4.22 MILLION/UL (ref 3.8–5.1)
SL AMB EGFR AFRICAN AMERICAN: 100 ML/MIN/1.73M2
SL AMB EGFR NON AFRICAN AMERICAN: 87 ML/MIN/1.73M2
SODIUM SERPL-SCNC: 139 MMOL/L (ref 135–146)
TRIGL SERPL-MCNC: 124 MG/DL
TSH SERPL-ACNC: 1.15 MIU/L (ref 0.4–4.5)
WBC # BLD AUTO: 5.8 THOUSAND/UL (ref 3.8–10.8)

## 2021-07-28 NOTE — PROGRESS NOTES
Consultation - [facility]  Jessa Galindo 69 y o  female MRN: [id number]          Assessment & Plan:    Pleasant 79-year-old female with acute onset of nausea, vomiting, diarrhea with diarrheal symptoms which have persisted, incidentally found to have large hepatic cysts upon presentation to the emergency room  1  Acute onset nausea, vomiting, diarrhea:  Likely infectious gastroenteritis with some persistent symptoms  -we will check extensive stool studies  -given her history will proceed with colonoscopy to exclude underlying pathology 1 stool studies have been returned  -clearly if she has any infectious etiology will treat that 1st and postpone her colonoscopy  -discussed with her the risks of the procedure including bleeding, surgery, perforation  -if she continues to have reflux symptoms we can consider upper endoscopy the time of her colonoscopy    2  Abnormal CT scan with hepatic cysts:  -these appear to be simple benign cysts  -we will check a right upper quadrant ultrasound, pending results of ultrasound we can consider checking an MRI  -will refer the patient to Surgical Oncology for additional expert opinion however typically these can be monitored with imaging for surveillance alone if no concerning findings  -tried to reassure the patient             _____________________________________________________________        CC:  Hepatic cysts    HPI:  Jessa Galindo is a 69 y  o female who was referred for evaluation of hepatic cysts  As you know this is a pleasant 79-year-old female, otherwise in good health, she notes that in early July started to have acute onset of diarrhea associated with nausea, abdominal pain, belching, bloating  Her symptoms had worsened, she presented to the emergency room which time she was found to have 2 large incidental hepatic cysts with some extrinsic compression on her gallbladder    Laboratory studies demonstrated normal LFTs, she had mild elevation of white count  Since then her diarrhea had mildly improved she has been taking antidiarrheals  She reports now having continued loose stools but of less frequency, apparently having 5 to 6 bowel movements per day, no blood or mucus  Her abdominal bloating has improved, still having some belching but overall nausea is improved  She is able to tolerate her diet  She did have upwards of 10 bowel movements at that time with nocturnal diarrhea  Denies any recent antibiotics, sick contacts  Denies any fevers or chills  Patient's last colonoscopy was approximately 6 years ago and reports that she had a bad experience with incomplete and difficult colonoscopy at that time  Surgical history is notable for hysterectomy, appendectomy  She denies any tobacco or alcohol  She is retired, family history is negative for GI or associated malignancies  ROS:  The remainder of the ROS was negative except for the pertinent positives mentioned in HPI  Allergies: Patient has no known allergies      Medications:   Current Outpatient Medications:     levothyroxine 50 mcg tablet, Take 1 tablet (50 mcg total) by mouth daily, Disp: 90 tablet, Rfl: 0    lisinopril-hydrochlorothiazide (PRINZIDE,ZESTORETIC) 20-12 5 MG per tablet, Take 1 tablet by mouth daily, Disp: 90 tablet, Rfl: 0    dicyclomine (BENTYL) 20 mg tablet, Take 1 tablet (20 mg total) by mouth 2 (two) times a day (Patient not taking: Reported on 7/31/2020), Disp: 20 tablet, Rfl: 0    esomeprazole (NexIUM) 20 mg capsule, Take 20 mg by mouth every morning before breakfast, Disp: , Rfl:     Na Sulfate-K Sulfate-Mg Sulf (Suprep Bowel Prep Kit) 17 5-3 13-1 6 GM/177ML SOLN, Take 1 Bottle by mouth once for 1 dose, Disp: 1 Bottle, Rfl: 0    ondansetron (ZOFRAN) 4 mg tablet, Take 1 tablet (4 mg total) by mouth every 6 (six) hours for 3 days, Disp: 9 tablet, Rfl: 0'    Past Medical History:   Diagnosis Date    Myalgia 8/3/2017    Right shoulder pain 5/27/2017    Viral upper respiratory tract infection 3/6/2018       Past Surgical History:   Procedure Laterality Date    HYSTERECTOMY         Family History   Problem Relation Age of Onset    No Known Problems Mother     No Known Problems Father     Thyroid disease Sister     Thyroid disease Brother     Thyroid disease Sister     Thyroid disease Sister     Thyroid disease Brother     Thyroid disease Brother         reports that she has never smoked  She has never used smokeless tobacco  She reports that she does not drink alcohol or use drugs            Physical Exam:     Pulse 80   Temp 97 6 °F (36 4 °C) (Tympanic)   Resp 18   Ht 5' 2"   Wt 81 2 kg (179 lb)   BMI 32 74 kg/m²     Gen: wn/wd, NAD, healthy-appearing female  HEENT: anicteric, MMM, no cervical LAD  CVS: RRR, no m/r/g  CHEST: CTA b/l  ABD: +BS, soft, NT,ND, no hepatosplenomegaly, no appreciable masses  EXT: no c/c/e  NEURO: aaox3  SKIN: NO rashes pain/decreased strength

## 2021-08-30 ENCOUNTER — OFFICE VISIT (OUTPATIENT)
Dept: FAMILY MEDICINE CLINIC | Facility: CLINIC | Age: 71
End: 2021-08-30
Payer: MEDICARE

## 2021-08-30 VITALS
TEMPERATURE: 98.1 F | DIASTOLIC BLOOD PRESSURE: 80 MMHG | SYSTOLIC BLOOD PRESSURE: 150 MMHG | BODY MASS INDEX: 35.7 KG/M2 | WEIGHT: 194 LBS | HEART RATE: 83 BPM | OXYGEN SATURATION: 97 % | HEIGHT: 62 IN

## 2021-08-30 DIAGNOSIS — R01.1 CARDIAC MURMUR: ICD-10-CM

## 2021-08-30 DIAGNOSIS — I10 ESSENTIAL HYPERTENSION: ICD-10-CM

## 2021-08-30 DIAGNOSIS — R17 ELEVATED BILIRUBIN: ICD-10-CM

## 2021-08-30 DIAGNOSIS — E03.9 HYPOTHYROIDISM, UNSPECIFIED TYPE: ICD-10-CM

## 2021-08-30 DIAGNOSIS — M25.473 MILD ANKLE EDEMA: Primary | ICD-10-CM

## 2021-08-30 LAB
ALBUMIN SERPL BCP-MCNC: 4.1 G/DL (ref 3.5–5)
ALP SERPL-CCNC: 83 U/L (ref 46–116)
ALT SERPL W P-5'-P-CCNC: 37 U/L (ref 12–78)
ANION GAP SERPL CALCULATED.3IONS-SCNC: 5 MMOL/L (ref 4–13)
AST SERPL W P-5'-P-CCNC: 23 U/L (ref 5–45)
BILIRUB SERPL-MCNC: 1.67 MG/DL (ref 0.2–1)
BUN SERPL-MCNC: 12 MG/DL (ref 5–25)
CALCIUM SERPL-MCNC: 9 MG/DL (ref 8.3–10.1)
CHLORIDE SERPL-SCNC: 106 MMOL/L (ref 100–108)
CHOLEST SERPL-MCNC: 200 MG/DL (ref 50–200)
CO2 SERPL-SCNC: 26 MMOL/L (ref 21–32)
CREAT SERPL-MCNC: 0.64 MG/DL (ref 0.6–1.3)
ERYTHROCYTE [DISTWIDTH] IN BLOOD BY AUTOMATED COUNT: 15.1 % (ref 11.6–15.1)
GFR SERPL CREATININE-BSD FRML MDRD: 89 ML/MIN/1.73SQ M
GLUCOSE P FAST SERPL-MCNC: 93 MG/DL (ref 65–99)
HCT VFR BLD AUTO: 40 % (ref 34.8–46.1)
HDLC SERPL-MCNC: 56 MG/DL
HGB BLD-MCNC: 13 G/DL (ref 11.5–15.4)
LDLC SERPL CALC-MCNC: 115 MG/DL (ref 0–100)
MCH RBC QN AUTO: 30.2 PG (ref 26.8–34.3)
MCHC RBC AUTO-ENTMCNC: 32.5 G/DL (ref 31.4–37.4)
MCV RBC AUTO: 93 FL (ref 82–98)
NONHDLC SERPL-MCNC: 144 MG/DL
PLATELET # BLD AUTO: 391 THOUSANDS/UL (ref 149–390)
PMV BLD AUTO: 8.8 FL (ref 8.9–12.7)
POTASSIUM SERPL-SCNC: 3.9 MMOL/L (ref 3.5–5.3)
PROT SERPL-MCNC: 7.8 G/DL (ref 6.4–8.2)
RBC # BLD AUTO: 4.3 MILLION/UL (ref 3.81–5.12)
SODIUM SERPL-SCNC: 137 MMOL/L (ref 136–145)
TRIGL SERPL-MCNC: 144 MG/DL
TSH SERPL DL<=0.05 MIU/L-ACNC: 1.13 UIU/ML (ref 0.36–3.74)
WBC # BLD AUTO: 8.82 THOUSAND/UL (ref 4.31–10.16)

## 2021-08-30 PROCEDURE — 85027 COMPLETE CBC AUTOMATED: CPT | Performed by: FAMILY MEDICINE

## 2021-08-30 PROCEDURE — 80061 LIPID PANEL: CPT | Performed by: FAMILY MEDICINE

## 2021-08-30 PROCEDURE — 84443 ASSAY THYROID STIM HORMONE: CPT | Performed by: FAMILY MEDICINE

## 2021-08-30 PROCEDURE — 80053 COMPREHEN METABOLIC PANEL: CPT | Performed by: FAMILY MEDICINE

## 2021-08-30 PROCEDURE — 82247 BILIRUBIN TOTAL: CPT | Performed by: FAMILY MEDICINE

## 2021-08-30 PROCEDURE — 36415 COLL VENOUS BLD VENIPUNCTURE: CPT | Performed by: FAMILY MEDICINE

## 2021-08-30 PROCEDURE — 99214 OFFICE O/P EST MOD 30 MIN: CPT | Performed by: FAMILY MEDICINE

## 2021-08-30 PROCEDURE — 82248 BILIRUBIN DIRECT: CPT | Performed by: FAMILY MEDICINE

## 2021-08-30 RX ORDER — LEVOTHYROXINE SODIUM 0.05 MG/1
50 TABLET ORAL DAILY
Qty: 90 TABLET | Refills: 1 | Status: SHIPPED | OUTPATIENT
Start: 2021-08-30 | End: 2022-03-05 | Stop reason: SDUPTHER

## 2021-08-30 RX ORDER — LISINOPRIL AND HYDROCHLOROTHIAZIDE 20; 12.5 MG/1; MG/1
1 TABLET ORAL DAILY
Qty: 90 TABLET | Refills: 1 | Status: SHIPPED | OUTPATIENT
Start: 2021-08-30 | End: 2022-03-05 | Stop reason: SDUPTHER

## 2021-08-30 NOTE — ASSESSMENT & PLAN NOTE
This could potentially be contributing factor to her minimal edema though she appears euthyroid otherwise

## 2021-08-30 NOTE — ASSESSMENT & PLAN NOTE
The patient has developed minimal dependent edema over the past couple of days  I see no evidence of CHF  She has been compliant with her lisinopril /hydrochlorothiazide  We asked her to continue with same  We are going to see her back on Thursday for a wellness visit  We will review her blood work from today  Also recommended that she get an echocardiogram due to her history of hypertension for further evaluation for diastolic dysfunction in addition to her murmur  She agrees with this plan

## 2021-08-30 NOTE — PROGRESS NOTES
Assessment/Plan:  Mild ankle edema   The patient has developed minimal dependent edema over the past couple of days  I see no evidence of CHF  She has been compliant with her lisinopril /hydrochlorothiazide  We asked her to continue with same  We are going to see her back on Thursday for a wellness visit  We will review her blood work from today  Also recommended that she get an echocardiogram due to her history of hypertension for further evaluation for diastolic dysfunction in addition to her murmur  She agrees with this plan  Cardiac murmur   Schedule echocardiogram     Hypothyroidism   This could potentially be contributing factor to her minimal edema though she appears euthyroid otherwise  Diagnoses and all orders for this visit:    Mild ankle edema    Essential hypertension  -     lisinopril-hydrochlorothiazide (PRINZIDE,ZESTORETIC) 20-12 5 MG per tablet; Take 1 tablet by mouth daily  -     Echo complete with contrast if indicated; Future    Hypothyroidism, unspecified type  -     levothyroxine 50 mcg tablet; Take 1 tablet (50 mcg total) by mouth daily  -     Echo complete with contrast if indicated; Future    Cardiac murmur  -     Echo complete with contrast if indicated; Future          Subjective:   Chief Complaint   Patient presents with    Joint Swelling     b/l ankles swelling  will drawn her fbw today,        Patient ID: Chitra Lechuga is a 66 y o  female  My ankles have been swollen  Developed a couple of days ago  Became uncomfortable  No history of same  L > R  No calf p[ain, CP or SOB    No PND or orthopnea  C/w BP meds  HPI    The patient is a 67-year-old female who presents today for concerns over lower extremity edema  She states that developed a couple of days ago  Last night it was worse and became "uncomfortable"  She has no history of edema  She has a history of essential hypertension but no history of CHF or coronary artery disease    She does have a history of a systolic cardiac murmur  She has had no echocardiogram   She denies any chest pain or shortness of breath  She has no PND orthopnea or edema  She has no cough  The following portions of the patient's history were reviewed and updated as appropriate: allergies, current medications, past family history, past medical history, past social history, past surgical history and problem list     Review of Systems   Constitutional: Negative  Cardiovascular: Positive for leg swelling  Negative for chest pain, irregular heartbeat, orthopnea and paroxysmal nocturnal dyspnea  Respiratory: Negative  Endocrine: Negative  Hematologic/Lymphatic: Negative  Musculoskeletal: Negative  Gastrointestinal: Negative  Neurological: Negative  Psychiatric/Behavioral: The patient is nervous/anxious  Objective:    Physical Exam  Vitals and nursing note reviewed  Constitutional:       Appearance: She is obese  She is not ill-appearing  Neck:      Vascular: No carotid bruit  Comments: No JVD  Cardiovascular:      Rate and Rhythm: Normal rate and regular rhythm  Heart sounds: Murmur heard  Comments: Systolic ejection murmur in the precordium  Pulmonary:      Effort: Pulmonary effort is normal       Breath sounds: Normal breath sounds  No wheezing, rhonchi or rales  Musculoskeletal:      Right lower leg: No edema  Comments: She has some trace ankle edema on the left inferior to the lateral malleolus  There does not appear to be evidence of ankle injury  There is no calf tenderness, no Homans sign or cord  No erythema  No pretibial edema  Lymphadenopathy:      Cervical: No cervical adenopathy  Neurological:      Mental Status: She is alert and oriented to person, place, and time  Psychiatric:         Mood and Affect: Mood normal          Thought Content:  Thought content normal          Judgment: Judgment normal          BP Readings from Last 12 Encounters:   08/30/21 150/80 03/04/21 132/82   09/04/20 122/72   08/18/20 152/80   07/31/20 122/76   07/19/20 158/70   07/16/20 126/76   09/24/19 128/82   03/12/19 120/72   01/26/19 118/80   12/04/18 122/74   09/11/18 120/70   ]  Wt Readings from Last 12 Encounters:   08/30/21 88 kg (194 lb)   03/04/21 84 6 kg (186 lb 8 oz)   09/04/20 81 2 kg (179 lb)   09/01/20 81 6 kg (180 lb)   08/18/20 81 6 kg (180 lb)   08/04/20 81 2 kg (179 lb)   07/31/20 83 kg (183 lb)   07/19/20 81 6 kg (180 lb)   07/16/20 83 5 kg (184 lb)   09/24/19 87 1 kg (192 lb)   03/12/19 84 4 kg (186 lb)   01/26/19 84 4 kg (186 lb)   ]

## 2021-08-31 DIAGNOSIS — R17 ELEVATED BILIRUBIN: Primary | ICD-10-CM

## 2021-08-31 LAB
BILIRUB DIRECT SERPL-MCNC: 0.32 MG/DL (ref 0–0.2)
BILIRUB SERPL-MCNC: 1.66 MG/DL (ref 0.2–1)

## 2021-09-02 ENCOUNTER — OFFICE VISIT (OUTPATIENT)
Dept: FAMILY MEDICINE CLINIC | Facility: CLINIC | Age: 71
End: 2021-09-02
Payer: MEDICARE

## 2021-09-02 DIAGNOSIS — Z00.00 MEDICARE ANNUAL WELLNESS VISIT, SUBSEQUENT: ICD-10-CM

## 2021-09-02 DIAGNOSIS — E66.9 OBESITY (BMI 30-39.9): ICD-10-CM

## 2021-09-02 DIAGNOSIS — R73.01 IMPAIRED FASTING GLUCOSE: ICD-10-CM

## 2021-09-02 DIAGNOSIS — K76.89 SIMPLE HEPATIC CYST: ICD-10-CM

## 2021-09-02 DIAGNOSIS — I10 ESSENTIAL HYPERTENSION: ICD-10-CM

## 2021-09-02 DIAGNOSIS — E03.9 HYPOTHYROIDISM, UNSPECIFIED TYPE: Primary | ICD-10-CM

## 2021-09-02 PROCEDURE — G0439 PPPS, SUBSEQ VISIT: HCPCS | Performed by: FAMILY MEDICINE

## 2021-09-02 PROCEDURE — 1123F ACP DISCUSS/DSCN MKR DOCD: CPT | Performed by: FAMILY MEDICINE

## 2021-09-02 PROCEDURE — 99213 OFFICE O/P EST LOW 20 MIN: CPT | Performed by: FAMILY MEDICINE

## 2021-09-03 VITALS — BODY MASS INDEX: 35.48 KG/M2 | DIASTOLIC BLOOD PRESSURE: 82 MMHG | WEIGHT: 194 LBS | SYSTOLIC BLOOD PRESSURE: 138 MMHG

## 2021-09-03 DIAGNOSIS — Z12.31 ENCOUNTER FOR SCREENING MAMMOGRAM FOR MALIGNANT NEOPLASM OF BREAST: Primary | ICD-10-CM

## 2021-09-03 NOTE — ASSESSMENT & PLAN NOTE
The patient is a 69-year-old female with a history of a large hepatic cyst   It was felt to be stable when evaluated by GI last year but there was recommendation that she see Surgical Oncology  At that time she decided against due to the pandemic  Her bilirubin is slightly higher with a very small direct component  Possibly obstructive based on her large cyst disease  I am going to recommend that we follow through on the Surgical Oncology evaluation

## 2021-09-03 NOTE — PATIENT INSTRUCTIONS
Medicare Preventive Visit Patient Instructions  Thank you for completing your Welcome to Medicare Visit or Medicare Annual Wellness Visit today  Your next wellness visit will be due in one year (9/4/2022)  The screening/preventive services that you may require over the next 5-10 years are detailed below  Some tests may not apply to you based off risk factors and/or age  Screening tests ordered at today's visit but not completed yet may show as past due  Also, please note that scanned in results may not display below  Preventive Screenings:  Service Recommendations Previous Testing/Comments   Colorectal Cancer Screening  * Colonoscopy    * Fecal Occult Blood Test (FOBT)/Fecal Immunochemical Test (FIT)  * Fecal DNA/Cologuard Test  * Flexible Sigmoidoscopy Age: 54-65 years old   Colonoscopy: every 10 years (may be performed more frequently if at higher risk)  OR  FOBT/FIT: every 1 year  OR  Cologuard: every 3 years  OR  Sigmoidoscopy: every 5 years  Screening may be recommended earlier than age 48 if at higher risk for colorectal cancer  Also, an individualized decision between you and your healthcare provider will decide whether screening between the ages of 74-80 would be appropriate  Colonoscopy: 08/18/2020  FOBT/FIT: Not on file  Cologuard: Not on file  Sigmoidoscopy: Not on file    Screening Current     Breast Cancer Screening Age: 36 years old  Frequency: every 1-2 years  Not required if history of left and right mastectomy Mammogram: 09/01/2020    Screening Current   Cervical Cancer Screening Between the ages of 21-29, pap smear recommended once every 3 years  Between the ages of 33-67, can perform pap smear with HPV co-testing every 5 years     Recommendations may differ for women with a history of total hysterectomy, cervical cancer, or abnormal pap smears in past  Pap Smear: Not on file    Screening Not Indicated   Hepatitis C Screening Once for adults born between 1945 and 1965  More frequently in patients at high risk for Hepatitis C Hep C Antibody: 09/11/2018    Screening Current   Diabetes Screening 1-2 times per year if you're at risk for diabetes or have pre-diabetes Fasting glucose: 93 mg/dL   A1C: 4 7 % of total Hgb    Screening Current   Cholesterol Screening Once every 5 years if you don't have a lipid disorder  May order more often based on risk factors  Lipid panel: 08/30/2021    Screening Current     Other Preventive Screenings Covered by Medicare:  1  Abdominal Aortic Aneurysm (AAA) Screening: covered once if your at risk  You're considered to be at risk if you have a family history of AAA  2  Lung Cancer Screening: covers low dose CT scan once per year if you meet all of the following conditions: (1) Age 50-69; (2) No signs or symptoms of lung cancer; (3) Current smoker or have quit smoking within the last 15 years; (4) You have a tobacco smoking history of at least 30 pack years (packs per day multiplied by number of years you smoked); (5) You get a written order from a healthcare provider  3  Glaucoma Screening: covered annually if you're considered high risk: (1) You have diabetes OR (2) Family history of glaucoma OR (3)  aged 48 and older OR (3)  American aged 72 and older  3  Osteoporosis Screening: covered every 2 years if you meet one of the following conditions: (1) You're estrogen deficient and at risk for osteoporosis based off medical history and other findings; (2) Have a vertebral abnormality; (3) On glucocorticoid therapy for more than 3 months; (4) Have primary hyperparathyroidism; (5) On osteoporosis medications and need to assess response to drug therapy  · Last bone density test (DXA Scan): Not on file  5  HIV Screening: covered annually if you're between the age of 12-76  Also covered annually if you are younger than 13 and older than 72 with risk factors for HIV infection   For pregnant patients, it is covered up to 3 times per pregnancy  Immunizations:  Immunization Recommendations   Influenza Vaccine Annual influenza vaccination during flu season is recommended for all persons aged >= 6 months who do not have contraindications   Pneumococcal Vaccine (Prevnar and Pneumovax)  * Prevnar = PCV13  * Pneumovax = PPSV23   Adults 25-60 years old: 1-3 doses may be recommended based on certain risk factors  Adults 72 years old: Prevnar (PCV13) vaccine recommended followed by Pneumovax (PPSV23) vaccine  If already received PPSV23 since turning 65, then PCV13 recommended at least one year after PPSV23 dose  Hepatitis B Vaccine 3 dose series if at intermediate or high risk (ex: diabetes, end stage renal disease, liver disease)   Tetanus (Td) Vaccine - COST NOT COVERED BY MEDICARE PART B Following completion of primary series, a booster dose should be given every 10 years to maintain immunity against tetanus  Td may also be given as tetanus wound prophylaxis  Tdap Vaccine - COST NOT COVERED BY MEDICARE PART B Recommended at least once for all adults  For pregnant patients, recommended with each pregnancy  Shingles Vaccine (Shingrix) - COST NOT COVERED BY MEDICARE PART B  2 shot series recommended in those aged 48 and above     Health Maintenance Due:      Topic Date Due    Breast Cancer Screening: Mammogram  09/01/2021    Colorectal Cancer Screening  08/18/2025    Hepatitis C Screening  Completed     Immunizations Due:      Topic Date Due    DTaP,Tdap,and Td Vaccines (1 - Tdap) Never done    Pneumococcal Vaccine: 65+ Years (1 of 1 - PPSV23) Never done    Influenza Vaccine (1) 09/01/2021     Advance Directives   What are advance directives? Advance directives are legal documents that state your wishes and plans for medical care  These plans are made ahead of time in case you lose your ability to make decisions for yourself   Advance directives can apply to any medical decision, such as the treatments you want, and if you want to donate organs  What are the types of advance directives? There are many types of advance directives, and each state has rules about how to use them  You may choose a combination of any of the following:  · Living will: This is a written record of the treatment you want  You can also choose which treatments you do not want, which to limit, and which to stop at a certain time  This includes surgery, medicine, IV fluid, and tube feedings  · Durable power of  for healthcare [facility]): This is a written record that states who you want to make healthcare choices for you when you are unable to make them for yourself  This person, called a proxy, is usually a family member or a friend  You may choose more than 1 proxy  · Do not resuscitate (DNR) order:  A DNR order is used in case your heart stops beating or you stop breathing  It is a request not to have certain forms of treatment, such as CPR  A DNR order may be included in other types of advance directives  · Medical directive: This covers the care that you want if you are in a coma, near death, or unable to make decisions for yourself  You can list the treatments you want for each condition  Treatment may include pain medicine, surgery, blood transfusions, dialysis, IV or tube feedings, and a ventilator (breathing machine)  · Values history: This document has questions about your views, beliefs, and how you feel and think about life  This information can help others choose the care that you would choose  Why are advance directives important? An advance directive helps you control your care  Although spoken wishes may be used, it is better to have your wishes written down  Spoken wishes can be misunderstood, or not followed  Treatments may be given even if you do not want them  An advance directive may make it easier for your family to make difficult choices about your care     Weight Management   Why it is important to manage your weight:  Being overweight increases your risk of health conditions such as heart disease, high blood pressure, type 2 diabetes, and certain types of cancer  It can also increase your risk for osteoarthritis, sleep apnea, and other respiratory problems  Aim for a slow, steady weight loss  Even a small amount of weight loss can lower your risk of health problems  How to lose weight safely:  A safe and healthy way to lose weight is to eat fewer calories and get regular exercise  You can lose up about 1 pound a week by decreasing the number of calories you eat by 500 calories each day  Healthy meal plan for weight management:  A healthy meal plan includes a variety of foods, contains fewer calories, and helps you stay healthy  A healthy meal plan includes the following:  · Eat whole-grain foods more often  A healthy meal plan should contain fiber  Fiber is the part of grains, fruits, and vegetables that is not broken down by your body  Whole-grain foods are healthy and provide extra fiber in your diet  Some examples of whole-grain foods are whole-wheat breads and pastas, oatmeal, brown rice, and bulgur  · Eat a variety of vegetables every day  Include dark, leafy greens such as spinach, kale, morgan greens, and mustard greens  Eat yellow and orange vegetables such as carrots, sweet potatoes, and winter squash  · Eat a variety of fruits every day  Choose fresh or canned fruit (canned in its own juice or light syrup) instead of juice  Fruit juice has very little or no fiber  · Eat low-fat dairy foods  Drink fat-free (skim) milk or 1% milk  Eat fat-free yogurt and low-fat cottage cheese  Try low-fat cheeses such as mozzarella and other reduced-fat cheeses  · Choose meat and other protein foods that are low in fat  Choose beans or other legumes such as split peas or lentils  Choose fish, skinless poultry (chicken or turkey), or lean cuts of red meat (beef or pork)  Before you cook meat or poultry, cut off any visible fat     · Use less fat and oil  Try baking foods instead of frying them  Add less fat, such as margarine, sour cream, regular salad dressing and mayonnaise to foods  Eat fewer high-fat foods  Some examples of high-fat foods include french fries, doughnuts, ice cream, and cakes  · Eat fewer sweets  Limit foods and drinks that are high in sugar  This includes candy, cookies, regular soda, and sweetened drinks  Exercise:  Exercise at least 30 minutes per day on most days of the week  Some examples of exercise include walking, biking, dancing, and swimming  You can also fit in more physical activity by taking the stairs instead of the elevator or parking farther away from stores  Ask your healthcare provider about the best exercise plan for you  © Copyright [employer] 2018 Information is for End User's use only and may not be sold, redistributed or otherwise used for commercial purposes   All illustrations and images included in CareNotes® are the copyrighted property of A D A M [employer]  or [address] [place] [address]

## 2021-09-03 NOTE — PROGRESS NOTES
Assessment and Plan:     Problem List Items Addressed This Visit        Endocrine    Impaired fasting glucose    Hypothyroidism - Primary       Cardiovascular and Mediastinum    Essential hypertension       Other    Obesity (BMI 30-39  9)    Medicare annual wellness visit, subsequent       The patient presents today for Medicare wellness visit  All components of the examination were addressed with the patient  Her mammography is currently due in she has a appointment scheduled for next week  She will receive influenza vaccine in October and consider pneumococcal vaccination at that time  COVID booster when appropriate  Preventive health issues were discussed with patient, and age appropriate screening tests were ordered as noted in patient's After Visit Summary  Personalized health advice and appropriate referrals for health education or preventive services given if needed, as noted in patient's After Visit Summary  History of Present Illness:     Patient presents for Medicare Annual Wellness visit    Patient Care Team:  Baldomero Noe MD as PCP - General (Family Medicine)     Problem List:     Patient Active Problem List   Diagnosis    Hypothyroidism    Essential hypertension    Cardiac murmur    Impaired fasting glucose    Obesity (BMI 30-39  9)    Screening breast examination    Dysfunction of left eustachian tube    Medicare annual wellness visit, subsequent    Simple hepatic cyst    Diarrhea    Mild ankle edema      Past Medical and Surgical History:     Past Medical History:   Diagnosis Date    Disease of thyroid gland     Hypertension     Myalgia 8/3/2017    Right shoulder pain 5/27/2017    Viral upper respiratory tract infection 3/6/2018     Past Surgical History:   Procedure Laterality Date    HYSTERECTOMY      OOPHORECTOMY        Family History:     Family History   Problem Relation Age of Onset    No Known Problems Mother     No Known Problems Father     Thyroid disease Sister     Thyroid disease Brother     Thyroid disease Sister     Thyroid disease Sister     Thyroid disease Brother     Thyroid disease Brother       Social History:     Social History     Socioeconomic History    Marital status: /Civil Union     Spouse name: Not on file    Number of children: Not on file    Years of education: Not on file    Highest education level: Not on file   Occupational History    Not on file   Tobacco Use    Smoking status: Never Smoker    Smokeless tobacco: Never Used   Vaping Use    Vaping Use: Never used   Substance and Sexual Activity    Alcohol use: Never    Drug use: Never    Sexual activity: Not on file   Other Topics Concern    Not on file   Social History Narrative    Not on file     Social Determinants of Health     Financial Resource Strain:     Difficulty of Paying Living Expenses:    Food Insecurity:     Worried About [address] in the Last Year:     [employer] [employer] in the Last Year:    Transportation Needs:     Lack of Transportation (Medical):      Lack of Transportation (Non-Medical):    Physical Activity:     Days of Exercise per Week:     Minutes of Exercise per Session:    Stress:     Feeling of Stress :    Social Connections:     Frequency of Communication with Friends and Family:     Frequency of Social Gatherings with Friends and Family:     Attends Confucianism Services:     Active Member of Clubs or Organizations:     Attends Club or Organization Meetings:     Marital Status:    Intimate Partner Violence:     Fear of Current or Ex-Partner:     Emotionally Abused:     Physically Abused:     Sexually Abused:       Medications and Allergies:     Current Outpatient Medications   Medication Sig Dispense Refill    levothyroxine 50 mcg tablet Take 1 tablet (50 mcg total) by mouth daily 90 tablet 1    lisinopril-hydrochlorothiazide (PRINZIDE,ZESTORETIC) 20-12 5 MG per tablet Take 1 tablet by mouth daily 90 tablet 1 No current facility-administered medications for this visit  No Known Allergies   Immunizations:     Immunization History   Administered Date(s) Administered    Influenza, high dose seasonal 0 7 mL 10/05/2020    SARS-CoV-2 / COVID-19 mRNA IM (Gloria Lancaster) 01/23/2021, 02/20/2021      Health Maintenance:         Topic Date Due    Breast Cancer Screening: Mammogram  09/01/2021    Colorectal Cancer Screening  08/18/2025    Hepatitis C Screening  Completed         Topic Date Due    DTaP,Tdap,and Td Vaccines (1 - Tdap) Never done    Pneumococcal Vaccine: 65+ Years (1 of 1 - PPSV23) Never done    Influenza Vaccine (1) 09/01/2021      Medicare Health Risk Assessment:     There were no vitals taken for this visit  Elodie Sicard is here for her Subsequent Wellness visit  Health Risk Assessment:   Patient rates overall health as very good  Patient feels that their physical health rating is same  Patient is very satisfied with their life  Eyesight was rated as same  Hearing was rated as same  Patient feels that their emotional and mental health rating is same  Patients states they are never, rarely angry  Pain experienced in the last 7 days has been none  Patient states that she has experienced no weight loss or gain in last 6 months  Depression Screening:   PHQ-2 Score: 0      Fall Risk Screening: In the past year, patient has experienced: no history of falling in past year      Urinary Incontinence Screening:   Patient has not leaked urine accidently in the last six months  Home Safety:  Patient does not have trouble with stairs inside or outside of their home  Patient has working smoke alarms and has working carbon monoxide detector  Home safety hazards include: none  Nutrition:   Current diet is Regular  Medications:   Patient is able to manage medications       Activities of Daily Living (ADLs)/Instrumental Activities of Daily Living (IADLs):   Walk and transfer into and out of bed and chair?: Yes  Dress and groom yourself?: Yes    Bathe or shower yourself?: Yes    Feed yourself? Yes  Do your laundry/housekeeping?: Yes  Manage your money, pay your bills and track your expenses?: Yes  Make your own meals?: Yes    Do your own shopping?: Yes    Previous Hospitalizations:   Any hospitalizations or ED visits within the last 12 months?: No      Advance Care Planning:   Living will: Yes    Durable POA for healthcare: Yes    Advanced directive: Yes      Cognitive Screening:   Provider or family/friend/caregiver concerned regarding cognition?: No    PREVENTIVE SCREENINGS      Cardiovascular Screening:    General: Screening Current      Diabetes Screening:     General: Screening Current      Colorectal Cancer Screening:     General: Screening Current      Breast Cancer Screening:     General: Screening Current      Cervical Cancer Screening:    General: Screening Not Indicated      Osteoporosis Screening:    General: Risks and Benefits Discussed    Due for: DXA Axial      Abdominal Aortic Aneurysm (AAA) Screening:        General: Screening Not Indicated      Lung Cancer Screening:     General: Screening Not Indicated      Hepatitis C Screening:    General: Screening Current      Preventive Screening Comments:  She will consider DEXA scan when current pandemic has resolved  She has mammography scheduled for next week  Screening, Brief Intervention, and Referral to Treatment (SBIRT)    Screening  Typical number of drinks in a day: 0  Typical number of drinks in a week: 0  Interpretation: Low risk drinking behavior      Single Item Drug Screening:  How often have you used an illegal drug (including marijuana) or a prescription medication for non-medical reasons in the past year? never    Single Item Drug Screen Score: 0  Interpretation: Negative screen for possible drug use disorder      Lindsey Solomon MD

## 2021-09-03 NOTE — PROGRESS NOTES
Assessment/Plan:  Medicare annual wellness visit, subsequent    The patient presents today for Medicare wellness visit  All components of the examination were addressed with the patient  Her mammography is currently due in she has a appointment scheduled for next week  She will receive influenza vaccine in October and consider pneumococcal vaccination at that time  COVID booster when appropriate  Simple hepatic cyst   The patient is a 58-year-old female with a history of a large hepatic cyst   It was felt to be stable when evaluated by GI last year but there was recommendation that she see Surgical Oncology  At that time she decided against due to the pandemic  Her bilirubin is slightly higher with a very small direct component  Possibly obstructive based on her large cyst disease  I am going to recommend that we follow through on the Surgical Oncology evaluation  Diagnoses and all orders for this visit:    Hypothyroidism, unspecified type    Essential hypertension    Obesity (BMI 30-39  9)    Impaired fasting glucose    Medicare annual wellness visit, subsequent    Simple hepatic cyst    Other orders  -     Cancel: CBC  -     Cancel: Comprehensive metabolic panel  -     Cancel: Lipid panel  -     Cancel: TSH, 3rd generation          Subjective:   Chief Complaint   Patient presents with    Medicare Wellness Visit     Subsequent awv/med check        Patient ID: Tulio Sweeney is a 66 y o  female  HPI   patient presents today for Medicare wellness visit  Additionally she has other issues to address  Her recent edema has resolved  She is going to have an echocardiogram performed in the near future  We reviewed her recent blood work  Lipids looked fairly good as well as her CBC  Her CMP appeared normal with the exception of total bili of 1 67  We added direct which was 0 32, slightly above the normal of 0 2  She has had some mildly elevated total bilirubin recently    She has had no nausea vomiting jaundice X cetera  Last year/ summer she had some abdominal pain workup revealed some hepatic cysts, 1 of which was quite large at 12 cm  Gastroenterology saw her felt her cysts were stable but did feel like she should see Surgical Oncology  Due to the pandemic she decided against that at that time  She has had no GI symptoms  No jaundice lightening of stools X cetera  The following portions of the patient's history were reviewed and updated as appropriate: allergies, current medications, past family history, past medical history, past social history, past surgical history and problem list     ROS      As noted in the HPI  Objective:    Physical Exam  Vitals and nursing note reviewed  Eyes:      General: No scleral icterus  Skin:     Coloration: Skin is not jaundiced  Neurological:      Mental Status: She is alert and oriented to person, place, and time  Psychiatric:         Thought Content:  Thought content normal          Judgment: Judgment normal       Comments: Anxious appearance

## 2021-09-03 NOTE — ASSESSMENT & PLAN NOTE
The patient presents today for Medicare wellness visit  All components of the examination were addressed with the patient  Her mammography is currently due in she has a appointment scheduled for next week  She will receive influenza vaccine in October and consider pneumococcal vaccination at that time  COVID booster when appropriate

## 2021-09-22 ENCOUNTER — HOSPITAL ENCOUNTER (OUTPATIENT)
Dept: NON INVASIVE DIAGNOSTICS | Facility: HOSPITAL | Age: 71
Discharge: HOME | End: 2021-09-22
Payer: MEDICARE

## 2021-09-22 DIAGNOSIS — E03.9 HYPOTHYROIDISM, UNSPECIFIED TYPE: ICD-10-CM

## 2021-09-22 DIAGNOSIS — I10 ESSENTIAL HYPERTENSION: ICD-10-CM

## 2021-09-22 DIAGNOSIS — R17 ELEVATED BILIRUBIN: ICD-10-CM

## 2021-09-22 DIAGNOSIS — R01.1 CARDIAC MURMUR: ICD-10-CM

## 2021-09-22 DIAGNOSIS — K76.89 SIMPLE HEPATIC CYST: Primary | ICD-10-CM

## 2021-09-22 PROCEDURE — 93306 TTE W/DOPPLER COMPLETE: CPT | Performed by: INTERNAL MEDICINE

## 2021-09-22 PROCEDURE — 93306 TTE W/DOPPLER COMPLETE: CPT

## 2021-09-22 NOTE — PROGRESS NOTES
The patient has hepatic cyst noted on imaging by history  Dr Josiane Garza had recommended surgical oncology evaluation last year  Due to the pandemic this did not occur  She does also have mild elevation of total bilirubin as well as mild elevation of direct  Possibly related to obstruction though again very mildly elevated  We are going to ask Surgical Oncology to evaluate her hepatic cysts and make recommendation  She agrees with this plan

## 2021-09-23 ENCOUNTER — TELEPHONE (OUTPATIENT)
Dept: HEMATOLOGY ONCOLOGY | Facility: CLINIC | Age: 71
End: 2021-09-23

## 2021-09-23 NOTE — TELEPHONE ENCOUNTER
New Patient GI Form   Patient Details:  Yesika Allred  1950  [id number]     Background Information:  [address] starts by opening a telephone encounter and gathering the following information   Who is calling to schedule and relationship? Patient   Referring Provider  Kristnia Jarrett MD   To which specialty is the referral? Surgical Oncology   Reason for Visit? New Diagnosis   Tumor Type? Simple hepatic cyst                Elevated bilirubin   Is this Cancer or Non-Cancer? Non-Cancer   Is patient aware of the diagnosis? Yes   Is there a confirmed diagnosis from biopsy/tissue reviewed by Pathology? No   Date of Tissue Diagnosis  (If done outside of [facility] please obtain report and slides)  (If no tissue diagnosis, please stop and discuss with Navigator prior to scheduling)     Has Imaging been completed? No   If YES, where was the imaging done? (If outside [place] [id number] obtain records)     Have any endoscopies been done (colonoscopy, EGD, EUS)  No   If YES, where were they performed? (If outside of [facility] obtain the records)     Has blood work been done? Yes   If YES, where was the blood work done? (If outside of [facility] obtain records)  08/30/2021 at [address] [place] [address]    Is there a personal history of cancer? (If YES please list type)  No   Is there a family history of cancer? (If YES please list type)  No   Are records needed from an outside facility? No   If yes, Name, [facility] and [place] [place] where facility is located  Scheduling Information:   Preferred [address]   Are you willing to see another doctor if your visit can be sooner? No   Are there any days the patient cannot be seen?      Miscellaneous:

## 2021-10-09 ENCOUNTER — HOSPITAL ENCOUNTER (OUTPATIENT)
Dept: RADIOLOGY | Facility: HOSPITAL | Age: 71
Discharge: HOME | End: 2021-10-09

## 2021-10-27 ENCOUNTER — IMMUNIZATIONS (OUTPATIENT)
Dept: FAMILY MEDICINE CLINIC | Facility: CLINIC | Age: 71
End: 2021-10-27
Payer: MEDICARE

## 2021-10-27 DIAGNOSIS — Z23 ENCOUNTER FOR IMMUNIZATION: Primary | ICD-10-CM

## 2021-10-27 PROCEDURE — G0008 ADMIN INFLUENZA VIRUS VAC: HCPCS

## 2021-10-27 PROCEDURE — 90662 IIV NO PRSV INCREASED AG IM: CPT

## 2021-11-02 ENCOUNTER — CONSULT (OUTPATIENT)
Dept: SURGICAL ONCOLOGY | Facility: CLINIC | Age: 71
End: 2021-11-02
Payer: MEDICARE

## 2021-11-02 ENCOUNTER — TELEPHONE (OUTPATIENT)
Dept: SURGICAL ONCOLOGY | Facility: CLINIC | Age: 71
End: 2021-11-02

## 2021-11-02 ENCOUNTER — APPOINTMENT (OUTPATIENT)
Dept: LAB | Facility: CLINIC | Age: 71
End: 2021-11-02
Payer: MEDICARE

## 2021-11-02 VITALS
WEIGHT: 187 LBS | OXYGEN SATURATION: 97 % | HEART RATE: 80 BPM | BODY MASS INDEX: 34.41 KG/M2 | DIASTOLIC BLOOD PRESSURE: 90 MMHG | HEIGHT: 62 IN | RESPIRATION RATE: 12 BRPM | SYSTOLIC BLOOD PRESSURE: 140 MMHG

## 2021-11-02 DIAGNOSIS — R17 ELEVATED BILIRUBIN: ICD-10-CM

## 2021-11-02 DIAGNOSIS — K76.89 SIMPLE HEPATIC CYST: Primary | ICD-10-CM

## 2021-11-02 LAB
BILIRUB DIRECT SERPL-MCNC: 0.34 MG/DL (ref 0–0.2)
BILIRUB SERPL-MCNC: 1.98 MG/DL (ref 0.2–1)

## 2021-11-02 PROCEDURE — 82248 BILIRUBIN DIRECT: CPT

## 2021-11-02 PROCEDURE — 99204 OFFICE O/P NEW MOD 45 MIN: CPT | Performed by: SURGERY

## 2021-11-02 PROCEDURE — 82247 BILIRUBIN TOTAL: CPT

## 2021-11-02 PROCEDURE — 36415 COLL VENOUS BLD VENIPUNCTURE: CPT

## 2021-11-05 ENCOUNTER — IMMUNIZATIONS (OUTPATIENT)
Dept: FAMILY MEDICINE CLINIC | Facility: HOSPITAL | Age: 71
End: 2021-11-05

## 2021-11-05 DIAGNOSIS — Z23 ENCOUNTER FOR IMMUNIZATION: Primary | ICD-10-CM

## 2021-11-05 PROCEDURE — 0013A COVID-19 MODERNA VACC 0.25 ML BOOSTER: CPT

## 2021-11-05 PROCEDURE — 91306 COVID-19 MODERNA VACC 0.25 ML BOOSTER: CPT

## 2021-11-09 ENCOUNTER — HOSPITAL ENCOUNTER (OUTPATIENT)
Dept: RADIOLOGY | Facility: HOSPITAL | Age: 71
Discharge: HOME | End: 2021-11-09
Payer: MEDICARE

## 2021-11-09 DIAGNOSIS — K76.89 SIMPLE HEPATIC CYST: ICD-10-CM

## 2021-11-09 PROCEDURE — 74160 CT ABDOMEN W/CONTRAST: CPT

## 2021-11-09 PROCEDURE — G1004 CDSM NDSC: HCPCS

## 2021-11-09 RX ADMIN — IOHEXOL 100 ML: 350 INJECTION, SOLUTION INTRAVENOUS at 09:37

## 2021-11-12 ENCOUNTER — TELEPHONE (OUTPATIENT)
Dept: HEMATOLOGY ONCOLOGY | Facility: CLINIC | Age: 71
End: 2021-11-12

## 2021-11-16 ENCOUNTER — OFFICE VISIT (OUTPATIENT)
Dept: SURGICAL ONCOLOGY | Facility: CLINIC | Age: 71
End: 2021-11-16
Payer: MEDICARE

## 2021-11-16 VITALS
BODY MASS INDEX: 32.39 KG/M2 | SYSTOLIC BLOOD PRESSURE: 140 MMHG | OXYGEN SATURATION: 97 % | TEMPERATURE: 98.4 F | RESPIRATION RATE: 16 BRPM | DIASTOLIC BLOOD PRESSURE: 90 MMHG | HEIGHT: 62 IN | HEART RATE: 80 BPM | WEIGHT: 176 LBS

## 2021-11-16 DIAGNOSIS — K76.89 SIMPLE HEPATIC CYST: Primary | ICD-10-CM

## 2021-11-16 PROCEDURE — 99214 OFFICE O/P EST MOD 30 MIN: CPT | Performed by: SURGERY

## 2021-11-22 ENCOUNTER — HOSPITAL ENCOUNTER (OUTPATIENT)
Dept: RADIOLOGY | Facility: HOSPITAL | Age: 71
Discharge: HOME | End: 2021-11-22
Payer: MEDICARE

## 2021-11-22 VITALS — WEIGHT: 176 LBS | BODY MASS INDEX: 32.39 KG/M2 | HEIGHT: 62 IN

## 2021-11-22 DIAGNOSIS — Z12.31 ENCOUNTER FOR SCREENING MAMMOGRAM FOR MALIGNANT NEOPLASM OF BREAST: ICD-10-CM

## 2021-11-22 PROCEDURE — 77067 SCR MAMMO BI INCL CAD: CPT

## 2021-11-22 PROCEDURE — 77063 BREAST TOMOSYNTHESIS BI: CPT

## 2022-03-01 ENCOUNTER — OFFICE VISIT (OUTPATIENT)
Dept: FAMILY MEDICINE CLINIC | Facility: CLINIC | Age: 72
End: 2022-03-01
Payer: MEDICARE

## 2022-03-01 VITALS
SYSTOLIC BLOOD PRESSURE: 130 MMHG | HEIGHT: 62 IN | WEIGHT: 178 LBS | BODY MASS INDEX: 32.76 KG/M2 | DIASTOLIC BLOOD PRESSURE: 78 MMHG

## 2022-03-01 DIAGNOSIS — I10 ESSENTIAL HYPERTENSION: Primary | ICD-10-CM

## 2022-03-01 DIAGNOSIS — E03.9 HYPOTHYROIDISM, UNSPECIFIED TYPE: ICD-10-CM

## 2022-03-01 DIAGNOSIS — K76.89 SIMPLE HEPATIC CYST: ICD-10-CM

## 2022-03-01 DIAGNOSIS — R73.01 IMPAIRED FASTING GLUCOSE: ICD-10-CM

## 2022-03-01 DIAGNOSIS — Z13.220 ENCOUNTER FOR SCREENING FOR LIPID DISORDER: ICD-10-CM

## 2022-03-01 DIAGNOSIS — R01.1 CARDIAC MURMUR: ICD-10-CM

## 2022-03-01 PROBLEM — R19.7 DIARRHEA: Status: RESOLVED | Noted: 2020-08-04 | Resolved: 2022-03-01

## 2022-03-01 PROCEDURE — 36415 COLL VENOUS BLD VENIPUNCTURE: CPT | Performed by: FAMILY MEDICINE

## 2022-03-01 PROCEDURE — 99214 OFFICE O/P EST MOD 30 MIN: CPT | Performed by: FAMILY MEDICINE

## 2022-03-01 NOTE — ASSESSMENT & PLAN NOTE
She had an essentially normal echo  She had trace AVR and TVR  EF was normal as well as wall thickness

## 2022-03-01 NOTE — PROGRESS NOTES
BMI Counseling: Body mass index is 32 56 kg/m²  The BMI is above normal  Nutrition recommendations include decreasing portion sizes, encouraging healthy choices of fruits and vegetables, consuming healthier snacks, limiting drinks that contain sugar and moderation in carbohydrate intake  Exercise recommendations include moderate physical activity 150 minutes/week and exercising 3-5 times per week  No pharmacotherapy was ordered  Rationale for BMI follow-up plan is due to patient being overweight or obese  Assessment/Plan:  Cardiac murmur  She had an essentially normal echo  She had trace AVR and TVR  EF was normal as well as wall thickness  Simple hepatic cyst  She will continue to follow with Dr Philly Ferraro    Hypothyroidism  She appears euthyroid  Will get a TSH today and follow up when results are available  She agrees  Impaired fasting glucose  Resolved  Will get a CMP today  Essential hypertension  Blood pressure well controlled with lisinopril/hydrochlorothiazide  CBC and CMP today  Recommended consideration of pneumococcal vaccination  She has recently received COVID-19 as well as influenza and she is going to hold off on pneumococcal vaccination presently  Will reconsider in the future  Diagnoses and all orders for this visit:    Essential hypertension  -     CBC  -     Comprehensive metabolic panel    Hypothyroidism, unspecified type  -     TSH, 3rd generation    Encounter for screening for lipid disorder  -     Lipid panel    Simple hepatic cyst    Impaired fasting glucose    Cardiac murmur          Subjective:   Chief Complaint   Patient presents with    Follow-up     Med Check        Patient ID: Danny Fontaine is a 66 y o  female  I feel very well  HPI  The patient is a 79-year-old female who presents today for routine follow-up of multiple medical problems including primary hypothyroidism, impaired fasting glucose, essential hypertension in addition to other    We reviewed her recent blood work which included mild elevation of total bilirubin  Also her imaging studies revealing simple hepatic cyst   She did have a visit with surgical Oncology  She was placed on surveillance imaging  She felt that her visit was very informative and she liked her surgical oncologist   The following portions of the patient's history were reviewed and updated as appropriate: allergies, current medications, past family history, past medical history, past social history, past surgical history and problem list     ROS    Per the HPI  Objective:    Physical Exam  Vitals and nursing note reviewed  Constitutional:       Comments: Somewhat overweight and in no distress   Neck:      Vascular: No carotid bruit  Comments: No JVD  Cardiovascular:      Rate and Rhythm: Normal rate and regular rhythm  Pulmonary:      Effort: Pulmonary effort is normal       Breath sounds: Normal breath sounds  Abdominal:      Palpations: There is no mass  Tenderness: There is no abdominal tenderness  Musculoskeletal:      Right lower leg: No edema  Left lower leg: No edema  Lymphadenopathy:      Cervical: No cervical adenopathy  Neurological:      Mental Status: She is alert and oriented to person, place, and time  Psychiatric:         Mood and Affect: Mood normal          Thought Content:  Thought content normal

## 2022-03-03 LAB
ALBUMIN SERPL-MCNC: 4.8 G/DL (ref 3.6–5.1)
ALBUMIN/GLOB SERPL: 2.1 (CALC) (ref 1–2.5)
ALP SERPL-CCNC: 81 U/L (ref 37–153)
ALT SERPL-CCNC: 24 U/L (ref 6–29)
AST SERPL-CCNC: 24 U/L (ref 10–35)
BILIRUB SERPL-MCNC: 2 MG/DL (ref 0.2–1.2)
BUN SERPL-MCNC: 10 MG/DL (ref 7–25)
BUN/CREAT SERPL: ABNORMAL (CALC) (ref 6–22)
CALCIUM SERPL-MCNC: 9.9 MG/DL (ref 8.6–10.4)
CHLORIDE SERPL-SCNC: 105 MMOL/L (ref 98–110)
CHOLEST SERPL-MCNC: 199 MG/DL
CHOLEST/HDLC SERPL: 3.3 (CALC)
CO2 SERPL-SCNC: 28 MMOL/L (ref 20–32)
CREAT SERPL-MCNC: 0.71 MG/DL (ref 0.6–0.93)
ERYTHROCYTE [DISTWIDTH] IN BLOOD BY AUTOMATED COUNT: 13.9 % (ref 11–15)
GLOBULIN SER CALC-MCNC: 2.3 G/DL (CALC) (ref 1.9–3.7)
GLUCOSE SERPL-MCNC: 110 MG/DL (ref 65–99)
HAPTOGLOB SERPL-MCNC: <8 MG/DL (ref 43–212)
HCT VFR BLD AUTO: 40.6 % (ref 35–45)
HDLC SERPL-MCNC: 60 MG/DL
HGB BLD-MCNC: 13.3 G/DL (ref 11.7–15.5)
LDH SERPL-CCNC: 213 U/L (ref 120–250)
LDLC SERPL CALC-MCNC: 112 MG/DL (CALC)
MCH RBC QN AUTO: 31.1 PG (ref 27–33)
MCHC RBC AUTO-ENTMCNC: 32.8 G/DL (ref 32–36)
MCV RBC AUTO: 95.1 FL (ref 80–100)
NONHDLC SERPL-MCNC: 139 MG/DL (CALC)
PLATELET # BLD AUTO: 365 THOUSAND/UL (ref 140–400)
PMV BLD REES-ECKER: 9.7 FL (ref 7.5–12.5)
POTASSIUM SERPL-SCNC: 4 MMOL/L (ref 3.5–5.3)
PROT SERPL-MCNC: 7.1 G/DL (ref 6.1–8.1)
RBC # BLD AUTO: 4.27 MILLION/UL (ref 3.8–5.1)
REF LAB TEST NAME: NORMAL
REF LAB TEST: NORMAL
SL AMB CLIENT CONTACT: NORMAL
SL AMB EGFR AFRICAN AMERICAN: 99 ML/MIN/1.73M2
SL AMB EGFR NON AFRICAN AMERICAN: 85 ML/MIN/1.73M2
SODIUM SERPL-SCNC: 141 MMOL/L (ref 135–146)
TRIGL SERPL-MCNC: 152 MG/DL
TSH SERPL-ACNC: 1.48 MIU/L (ref 0.4–4.5)
WBC # BLD AUTO: 6.9 THOUSAND/UL (ref 3.8–10.8)

## 2022-03-14 ENCOUNTER — OFFICE VISIT (OUTPATIENT)
Dept: FAMILY MEDICINE CLINIC | Facility: CLINIC | Age: 72
End: 2022-03-14
Payer: MEDICARE

## 2022-03-14 VITALS — DIASTOLIC BLOOD PRESSURE: 80 MMHG | TEMPERATURE: 98.7 F | SYSTOLIC BLOOD PRESSURE: 132 MMHG

## 2022-03-14 DIAGNOSIS — R77.8 LOW SERUM HAPTOGLOBIN: ICD-10-CM

## 2022-03-14 DIAGNOSIS — H00.025 HORDEOLUM INTERNUM LEFT LOWER EYELID: Primary | ICD-10-CM

## 2022-03-14 DIAGNOSIS — E80.6 HYPERBILIRUBINEMIA: ICD-10-CM

## 2022-03-14 PROCEDURE — 99214 OFFICE O/P EST MOD 30 MIN: CPT | Performed by: FAMILY MEDICINE

## 2022-03-14 RX ORDER — ERYTHROMYCIN 5 MG/G
0.5 OINTMENT OPHTHALMIC EVERY 6 HOURS SCHEDULED
Qty: 3.5 G | Refills: 0 | Status: SHIPPED | OUTPATIENT
Start: 2022-03-14 | End: 2022-03-29

## 2022-03-14 NOTE — PROGRESS NOTES
Assessment/Plan:  Hordeolum internum left lower eyelid  She has a hordeolum internum of her left lower lid  She is going to use warm compresses q i d  followed by Ilotycin ointment applied to her left lower conjunctival sac  If her symptoms do not resolve over the next 4872 hours she is asked call for re-evaluation  She also may contact her ophthalmologist, Dr Bindu Rosa  She is in agreement with this plan  Hyperbilirubinemia  Patient has some mild hyperbilirubinemia  Initially we felt this probably represented Gilbert's syndrome  She does have about 80% indirect bilirubin  It has been slowly rising and was recently 1 98  We did obtain haptoglobin all for recent blood work which was low  I believe that she may have a hemolytic process as the source of her unconjugated hyperbilirubinemia  She is not anemic presently  Surgical Oncology recommended a gastroenterology evaluation which I believe is appropriate  She had seen Dr Murray Mcdonough in the past for her hepatic cyst   We are going to ask him to re-evaluate         Diagnoses and all orders for this visit:    Hordeolum internum left lower eyelid  -     erythromycin (ILOTYCIN) ophthalmic ointment; Administer 0 5 inches into the left eye every 6 (six) hours    Hyperbilirubinemia  -     Ambulatory Referral to Gastroenterology; Future    Low serum haptoglobin  -     Ambulatory Referral to Gastroenterology; Future          Subjective:   Chief Complaint   Patient presents with    Check left eye  Looks like a sty on the inside of lower lid        Patient ID: Alexa Bhat is a 67 y o  female  I have a stye for the past 2 days  Plastered shut this am  No URI sx, no visual disturbance  No sx  AR      HPI  The patient is a 65-year-old female who presents today with concerns over her left eye  It has been irritating for the past 2 days  She Google it and states that looks somewhat like a stye  This morning she awoke and her left eye was plastered shut    She has had no upper respiratory symptoms  She has had no visual disturbance  She has had no symptoms of allergic rhinitis  The following portions of the patient's history were reviewed and updated as appropriate: allergies, current medications, past family history, past medical history, past social history, past surgical history and problem list     ROS    Per the HPI  Objective:    Physical Exam  Vitals reviewed  Constitutional:       Comments: Overweight and in no apparent distress   Eyes:      General:         Right eye: No discharge  Comments: Her sclera are mildly icteric today  She has a hordeolum internum of her left lower lid  No active drainage and some mild erythema  Neurological:      Mental Status: She is alert and oriented to person, place, and time  Psychiatric:         Thought Content:  Thought content normal          Judgment: Judgment normal       Comments: Somewhat anxious which is her baseline

## 2022-03-14 NOTE — ASSESSMENT & PLAN NOTE
Patient has some mild hyperbilirubinemia  Initially we felt this probably represented Gilbert's syndrome  She does have about 80% indirect bilirubin  It has been slowly rising and was recently 1 98  We did obtain haptoglobin all for recent blood work which was low  I believe that she may have a hemolytic process as the source of her unconjugated hyperbilirubinemia  She is not anemic presently  Surgical Oncology recommended a gastroenterology evaluation which I believe is appropriate    She had seen Dr Krista Álvarez in the past for her hepatic cyst   We are going to ask him to re-evaluate

## 2022-03-14 NOTE — ASSESSMENT & PLAN NOTE
She has a hordeolum internum of her left lower lid  She is going to use warm compresses q i d  followed by Ilotycin ointment applied to her left lower conjunctival sac  If her symptoms do not resolve over the next 4872 hours she is asked call for re-evaluation  She also may contact her ophthalmologist, Dr Tawanda Krueger  She is in agreement with this plan

## 2022-03-24 ENCOUNTER — OFFICE VISIT (OUTPATIENT)
Dept: FAMILY MEDICINE CLINIC | Facility: CLINIC | Age: 72
End: 2022-03-24
Payer: MEDICARE

## 2022-03-24 ENCOUNTER — HOSPITAL ENCOUNTER (OUTPATIENT)
Dept: RADIOLOGY | Facility: HOSPITAL | Age: 72
Discharge: HOME | End: 2022-03-24
Payer: MEDICARE

## 2022-03-24 VITALS — DIASTOLIC BLOOD PRESSURE: 80 MMHG | SYSTOLIC BLOOD PRESSURE: 134 MMHG

## 2022-03-24 DIAGNOSIS — H00.025 HORDEOLUM INTERNUM LEFT LOWER EYELID: ICD-10-CM

## 2022-03-24 DIAGNOSIS — M79.672 LEFT FOOT PAIN: Primary | ICD-10-CM

## 2022-03-24 DIAGNOSIS — M79.672 LEFT FOOT PAIN: ICD-10-CM

## 2022-03-24 PROCEDURE — 99214 OFFICE O/P EST MOD 30 MIN: CPT | Performed by: FAMILY MEDICINE

## 2022-03-24 PROCEDURE — 73630 X-RAY EXAM OF FOOT: CPT

## 2022-03-24 NOTE — ASSESSMENT & PLAN NOTE
I believe her foot pain is unrelated to her hyperbilirubinemia  She has an appointment with Gastroenterology next week  We discussed that it is possibly on the basis of hemolysis as well  She does not suffer from anemia presently but if GI recommends after their evaluation we certainly would recommend further evaluation by Hematology    She agrees with this plan

## 2022-03-24 NOTE — PROGRESS NOTES
Assessment/Plan:  Left foot pain  Patient has left foot pain the, the etiology of which is unclear  It is not claudicatory in nature  She has normal capillary refill  She has no deformity other than bunion which is unchanged and not presently inflamed  Her no joint effusions  She has no sciatic radiculitis type symptoms  We are going to have her continue to observe  If she does not see improvement over the next couple of days we are going to ask her to go for an x-ray though I do not believe this is fracture, stress fracture X cetera  She is going to follow up with us over the next few days with report of her condition  She agrees with this plan  Hordeolum internum left lower eyelid  Significantly improved  Hyperbilirubinemia  I believe her foot pain is unrelated to her hyperbilirubinemia  She has an appointment with Gastroenterology next week  We discussed that it is possibly on the basis of hemolysis as well  She does not suffer from anemia presently but if GI recommends after their evaluation we certainly would recommend further evaluation by Hematology  She agrees with this plan         Diagnoses and all orders for this visit:    Left foot pain  -     XR foot 3+ vw left; Future    Hordeolum internum left lower eyelid          Subjective:   Chief Complaint   Patient presents with    Foot Pain     Left foot pain started yesterday  Woke her up in the middle of the night  Patient ID: Grupo Robb is a 67 y o  female  Last night I woke up with F foot pain  HPI  The patient is a 18-year-old female who presents today for evaluation of left foot pain which woke her up from sleep in the middle of the night  This is the 1st episode of this pain  She recalls no trauma  It is not exacerbated by ambulation  She has no swelling of the ankle or foot  No calf pain  She has had no lumbago or sciatic like radiculitis    She has a history of a bunion on this side of the foot but this is unchanged for many years  She has had no fevers chills night sweats or other constitutional symptoms  She is being evaluated for hyperbilirubinemia and wonders if it may be related to this  She is quite anxious over it  The following portions of the patient's history were reviewed and updated as appropriate: allergies, current medications, past family history, past medical history, past social history, past surgical history and problem list     ROS    Per the HPI  Objective:    Physical Exam  Constitutional:       Appearance: Normal appearance  Musculoskeletal:         General: No swelling, tenderness or deformity  Right lower leg: No edema  Left lower leg: No edema  Comments: Examination of the left foot reveals a bunion deformity of the 1st MTP joint  Otherwise the foot appears normal   Forefoot squeeze is negative  Palpation of the metatarsals do not reveal tenderness  She has no ankle effusion  There is no tenderness in this area  She has no Achilles or calcaneus tenderness  She has no calf tenderness, Homans sign or cord  She has normal capillary refill  She has normal monofilament sensation  Neurological:      Mental Status: She is alert and oriented to person, place, and time  Psychiatric:         Thought Content:  Thought content normal          Judgment: Judgment normal       Comments: Anxious which is baseline

## 2022-03-24 NOTE — ASSESSMENT & PLAN NOTE
Patient has left foot pain the, the etiology of which is unclear  It is not claudicatory in nature  She has normal capillary refill  She has no deformity other than bunion which is unchanged and not presently inflamed  Her no joint effusions  She has no sciatic radiculitis type symptoms  We are going to have her continue to observe  If she does not see improvement over the next couple of days we are going to ask her to go for an x-ray though I do not believe this is fracture, stress fracture X cetera  She is going to follow up with us over the next few days with report of her condition  She agrees with this plan

## 2022-03-29 ENCOUNTER — APPOINTMENT (OUTPATIENT)
Dept: LAB | Facility: CLINIC | Age: 72
End: 2022-03-29
Payer: MEDICARE

## 2022-03-29 ENCOUNTER — OFFICE VISIT (OUTPATIENT)
Dept: GASTROENTEROLOGY | Facility: CLINIC | Age: 72
End: 2022-03-29
Payer: MEDICARE

## 2022-03-29 VITALS
OXYGEN SATURATION: 98 % | WEIGHT: 176 LBS | HEART RATE: 87 BPM | SYSTOLIC BLOOD PRESSURE: 131 MMHG | HEIGHT: 62 IN | BODY MASS INDEX: 32.39 KG/M2 | DIASTOLIC BLOOD PRESSURE: 84 MMHG | TEMPERATURE: 97.7 F

## 2022-03-29 DIAGNOSIS — E80.6 HYPERBILIRUBINEMIA: ICD-10-CM

## 2022-03-29 DIAGNOSIS — K76.89 SIMPLE HEPATIC CYST: ICD-10-CM

## 2022-03-29 DIAGNOSIS — R77.8 LOW SERUM HAPTOGLOBIN: ICD-10-CM

## 2022-03-29 DIAGNOSIS — E80.6 HYPERBILIRUBINEMIA: Primary | ICD-10-CM

## 2022-03-29 DIAGNOSIS — R19.5 LOOSE STOOLS: ICD-10-CM

## 2022-03-29 LAB
ALBUMIN SERPL BCP-MCNC: 4.1 G/DL (ref 3.5–5)
ALP SERPL-CCNC: 86 U/L (ref 46–116)
ALT SERPL W P-5'-P-CCNC: 24 U/L (ref 12–78)
AST SERPL W P-5'-P-CCNC: 22 U/L (ref 5–45)
BASOPHILS # BLD AUTO: 0.11 THOUSANDS/ΜL (ref 0–0.1)
BASOPHILS NFR BLD AUTO: 1 % (ref 0–1)
BILIRUB DIRECT SERPL-MCNC: 0.34 MG/DL (ref 0–0.2)
BILIRUB SERPL-MCNC: 1.62 MG/DL (ref 0.2–1)
CRP SERPL QL: <3 MG/L
EOSINOPHIL # BLD AUTO: 0.11 THOUSAND/ΜL (ref 0–0.61)
EOSINOPHIL NFR BLD AUTO: 1 % (ref 0–6)
ERYTHROCYTE [DISTWIDTH] IN BLOOD BY AUTOMATED COUNT: 14.8 % (ref 11.6–15.1)
HCT VFR BLD AUTO: 36.3 % (ref 34.8–46.1)
HGB BLD-MCNC: 12.6 G/DL (ref 11.5–15.4)
IMM GRANULOCYTES # BLD AUTO: 0.04 THOUSAND/UL (ref 0–0.2)
IMM GRANULOCYTES NFR BLD AUTO: 0 % (ref 0–2)
LYMPHOCYTES # BLD AUTO: 3.2 THOUSANDS/ΜL (ref 0.6–4.47)
LYMPHOCYTES NFR BLD AUTO: 30 % (ref 14–44)
MCH RBC QN AUTO: 31.3 PG (ref 26.8–34.3)
MCHC RBC AUTO-ENTMCNC: 34.7 G/DL (ref 31.4–37.4)
MCV RBC AUTO: 90 FL (ref 82–98)
MONOCYTES # BLD AUTO: 0.7 THOUSAND/ΜL (ref 0.17–1.22)
MONOCYTES NFR BLD AUTO: 7 % (ref 4–12)
NEUTROPHILS # BLD AUTO: 6.65 THOUSANDS/ΜL (ref 1.85–7.62)
NEUTS SEG NFR BLD AUTO: 61 % (ref 43–75)
NRBC BLD AUTO-RTO: 0 /100 WBCS
PLATELET # BLD AUTO: 387 THOUSANDS/UL (ref 149–390)
PMV BLD AUTO: 10.7 FL (ref 8.9–12.7)
PROT SERPL-MCNC: 7.2 G/DL (ref 6.4–8.2)
RBC # BLD AUTO: 4.03 MILLION/UL (ref 3.81–5.12)
WBC # BLD AUTO: 10.81 THOUSAND/UL (ref 4.31–10.16)

## 2022-03-29 PROCEDURE — 85025 COMPLETE CBC W/AUTO DIFF WBC: CPT

## 2022-03-29 PROCEDURE — 86364 TISS TRNSGLTMNASE EA IG CLAS: CPT

## 2022-03-29 PROCEDURE — 36415 COLL VENOUS BLD VENIPUNCTURE: CPT

## 2022-03-29 PROCEDURE — 86140 C-REACTIVE PROTEIN: CPT

## 2022-03-29 PROCEDURE — 82784 ASSAY IGA/IGD/IGG/IGM EACH: CPT

## 2022-03-29 PROCEDURE — 99214 OFFICE O/P EST MOD 30 MIN: CPT | Performed by: INTERNAL MEDICINE

## 2022-03-29 PROCEDURE — 86258 DGP ANTIBODY EACH IG CLASS: CPT

## 2022-03-29 PROCEDURE — 83010 ASSAY OF HAPTOGLOBIN QUANT: CPT

## 2022-03-29 PROCEDURE — 86231 EMA EACH IG CLASS: CPT

## 2022-03-29 PROCEDURE — 80076 HEPATIC FUNCTION PANEL: CPT

## 2022-03-29 NOTE — LETTER
March 29, 2022     Danitza Monroy MD  [address] [place] [facility] [place] [address]    Patient: Wang Villaseñor   YOB: 1950   Date of Visit: 3/29/2022       Dear Dr Aguirre Ano:    Thank you for referring Wang Villaseñor to me for evaluation  Below are my notes for this consultation  If you have questions, please do not hesitate to call me  I look forward to following your patient along with you  Sincerely,        Yakelin Nava MD        CC: No Recipients  Yakelin Nava MD  3/29/2022 11:17 AM  Sign when Signing Visit  [facility]  Wang Villaseñor 67 y o  female MRN: [id number]            Assessment & Plan:    Pleasant 65-year-old female, here for evaluation of elevated bilirubin, loose stools, known hepatic cysts  1  Elevated bilirubin:  Picture in the past has been predominantly elevated indirect bilirubin with normal biliary tree on prior imaging, 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  However recent haptoglobin was low  She has had most her labs done in a fasting state  -recommended the patient try eat and stay hydrated today, get blood work when she is nonfasting if it is normal then this is consistent with conjugation disorder  -if bilirubin remains elevated, we will recommend MRI/MRCP to evaluate for any evidence of biliary obstruction given mild enlargement of cysts on recent CT scan at with mild compression for gallbladder  -will repeat hemolysis labs as well including peripheral smear  -if consistent with hemolytic process will refer to Hematology    2  Hepatic cysts:  Mild increase in size, otherwise stable and benign appearance, continue to follow-up with Surgical Oncology    3   Loose stools:  Ever since the initial presentation in 2020 which presented with nausea, vomiting, diarrhea she has had continued loose stools usually worse in the morning  -strongly suspect she had may have a component of postinfectious IBS versus lactose intolerance  -biopsies at the time of her colonoscopy were negative for microscopic colitis  -she had at evidence of active inflammatory changes  -recommended strict lactose-free diet for 2 weeks, trial of daily fiber supplement  -we will check CRP, celiac panel    Will have the patient follow up in 3 months time to re-evaluate  Ted was seen today for low serum haptoglobin and hyperbilirubinemia  Diagnoses and all orders for this visit:    Hyperbilirubinemia  -     Ambulatory Referral to Gastroenterology  -     Hepatic function panel; Future  -     Haptoglobin; Future  -     CBC and differential; Future  -     Hemolysis Smear; Future    Low serum haptoglobin  -     Ambulatory Referral to Gastroenterology    Simple hepatic cyst    Loose stools  -     Celiac Disease Antibody Profile; Future  -     C-reactive protein; Future            _____________________________________________________________        CC:  Elevated bilirubin    HPI:  Red Galeana is a 67 y  o female who is here for elevated bilirubin  This is a pleasant 77-year-old female with history of hypertension, initially seen for acute onset nausea, vomiting, diarrhea incidentally found to have large hepatic cysts for which she has been following with Surgical Oncology, these been monitored carefully and have been relatively unchanged, mild increase in size but benign in nature  Patient reports that ever since that episode of initial nausea vomiting and diarrhea she has had several episodes of loose stools, typically about 4 5 loose stools in the morning after which she is okay for the rest of the day  Denies any melena rectal bleeding, denies any abdominal pain  Denies any nausea, vomiting  Appetite is good, weight has been stable  Recently she has had laboratory studies which have demonstrated elevated total bilirubin but normal direct bilirubin, recent haptoglobin levels were also less than 8   Her CBC has been normal     She otherwise has no significant symptoms, has been feeling well with no GI complaints  ROS:  The remainder of the ROS was negative except for the pertinent positives mentioned in HPI  Allergies: Patient has no known allergies  Medications:   Current Outpatient Medications:     levothyroxine 50 mcg tablet, Take 1 tablet (50 mcg total) by mouth daily, Disp: 90 tablet, Rfl: 1    lisinopril-hydrochlorothiazide (PRINZIDE,ZESTORETIC) 20-12 5 MG per tablet, Take 1 tablet by mouth daily, Disp: 90 tablet, Rfl: 1    Past Medical History:   Diagnosis Date    Disease of thyroid gland     Hypertension     Myalgia 8/3/2017    Right shoulder pain 5/27/2017    Viral upper respiratory tract infection 3/6/2018       Past Surgical History:   Procedure Laterality Date    HYSTERECTOMY      OOPHORECTOMY         Family History   Problem Relation Age of Onset    No Known Problems Mother     No Known Problems Father     Thyroid disease Sister     Thyroid disease Brother     Thyroid disease Sister     Thyroid disease Sister     Thyroid disease Brother     Thyroid disease Brother         reports that she has never smoked  She has never used smokeless tobacco  She reports that she does not drink alcohol and does not use drugs        Physical Exam:    /84 (BP Location: Right arm, Patient Position: Sitting, Cuff Size: Standard)   Pulse 87   Temp 97 7 °F (36 5 °C)   Ht 5' 2" (1 575 m)   Wt 79 8 kg (176 lb)   SpO2 98%   BMI 32 19 kg/m²     Gen: wn/wd, NAD, healthy-appearing female  HEENT: anicteric, MMM, no cervical LAD  CVS: RRR, no m/r/g  CHEST: CTA b/l  ABD: +BS, soft, NT,ND, no hepatosplenomegaly  EXT: no c/c/e  NEURO: aaox3  SKIN: NO rashes

## 2022-03-29 NOTE — PROGRESS NOTES
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  However recent haptoglobin was low  She has had most her labs done in a fasting state  -recommended the patient try eat and stay hydrated today, get blood work when she is nonfasting if it is normal then this is consistent with conjugation disorder  -if bilirubin remains elevated, we will recommend MRI/MRCP to evaluate for any evidence of biliary obstruction given mild enlargement of cysts on recent CT scan at with mild compression for gallbladder  -will repeat hemolysis labs as well including peripheral smear  -if consistent with hemolytic process will refer to Hematology    2  Hepatic cysts:  Mild increase in size, otherwise stable and benign appearance, continue to follow-up with Surgical Oncology    3  Loose stools:  Ever since the initial presentation in 2020 which presented with nausea, vomiting, diarrhea she has had continued loose stools usually worse in the morning  -strongly suspect she had may have a component of postinfectious IBS versus lactose intolerance  -biopsies at the time of her colonoscopy were negative for microscopic colitis  -she had at evidence of active inflammatory changes  -recommended strict lactose-free diet for 2 weeks, trial of daily fiber supplement  -we will check CRP, celiac panel    Will have the patient follow up in 3 months time to re-evaluate  Ted was seen today for low serum haptoglobin and hyperbilirubinemia      Diagnoses and all orders for this visit:    Hyperbilirubinemia  -     Ambulatory Referral to Gastroenterology  -     Hepatic function panel; Future  -     Haptoglobin; Future  -     CBC and differential; Future  -     Hemolysis Smear; Future    Low serum haptoglobin  -     Ambulatory Referral to Gastroenterology    Simple hepatic cyst    Loose stools  -     Celiac Disease Antibody Profile; Future  -     C-reactive protein; Future            _____________________________________________________________        CC:  Elevated bilirubin    HPI:  Aldo Lehman is a 67 y  o female who is here for elevated bilirubin  This is a pleasant 79-year-old female with history of hypertension, initially seen for acute onset nausea, vomiting, diarrhea incidentally found to have large hepatic cysts for which she has been following with Surgical Oncology, these been monitored carefully and have been relatively unchanged, mild increase in size but benign in nature  Patient reports that ever since that episode of initial nausea vomiting and diarrhea she has had several episodes of loose stools, typically about 4 5 loose stools in the morning after which she is okay for the rest of the day  Denies any melena rectal bleeding, denies any abdominal pain  Denies any nausea, vomiting  Appetite is good, weight has been stable  Recently she has had laboratory studies which have demonstrated elevated total bilirubin but normal direct bilirubin, recent haptoglobin levels were also less than 8  Her CBC has been normal     She otherwise has no significant symptoms, has been feeling well with no GI complaints  ROS:  The remainder of the ROS was negative except for the pertinent positives mentioned in HPI  Allergies: Patient has no known allergies      Medications:   Current Outpatient Medications:     levothyroxine 50 mcg tablet, Take 1 tablet (50 mcg total) by mouth daily, Disp: 90 tablet, Rfl: 1    lisinopril-hydrochlorothiazide (PRINZIDE,ZESTORETIC) 20-12 5 MG per tablet, Take 1 tablet by mouth daily, Disp: 90 tablet, Rfl: 1    Past Medical History: Diagnosis Date    Disease of thyroid gland     Hypertension     Myalgia 8/3/2017    Right shoulder pain 5/27/2017    Viral upper respiratory tract infection 3/6/2018       Past Surgical History:   Procedure Laterality Date    HYSTERECTOMY      OOPHORECTOMY         Family History   Problem Relation Age of Onset    No Known Problems Mother     No Known Problems Father     Thyroid disease Sister     Thyroid disease Brother     Thyroid disease Sister     Thyroid disease Sister     Thyroid disease Brother     Thyroid disease Brother         reports that she has never smoked  She has never used smokeless tobacco  She reports that she does not drink alcohol and does not use drugs        Physical Exam:    /84 (BP Location: Right arm, Patient Position: Sitting, Cuff Size: Standard)   Pulse 87   Temp 97 7 °F (36 5 °C)   Ht 5' 2" (1 575 m)   Wt 79 8 kg (176 lb)   SpO2 98%   BMI 32 19 kg/m²     Gen: wn/wd, NAD, healthy-appearing female  HEENT: anicteric, MMM, no cervical LAD  CVS: RRR, no m/r/g  CHEST: CTA b/l  ABD: +BS, soft, NT,ND, no hepatosplenomegaly  EXT: no c/c/e  NEURO: aaox3  SKIN: NO rashes

## 2022-03-30 LAB
BLD SMEAR INTERP: NORMAL
HAPTOGLOB SERPL-MCNC: <10 MG/DL (ref 42–346)

## 2022-03-31 LAB
ENDOMYSIUM IGA SER QL: NEGATIVE
GLIADIN PEPTIDE IGA SER-ACNC: 4 UNITS (ref 0–19)
GLIADIN PEPTIDE IGG SER-ACNC: 2 UNITS (ref 0–19)
IGA SERPL-MCNC: 160 MG/DL (ref 64–422)
TTG IGA SER-ACNC: <2 U/ML (ref 0–3)
TTG IGG SER-ACNC: <2 U/ML (ref 0–5)

## 2022-04-29 ENCOUNTER — IMMUNIZATIONS (OUTPATIENT)
Dept: FAMILY MEDICINE CLINIC | Facility: HOSPITAL | Age: 72
End: 2022-04-29

## 2022-04-29 DIAGNOSIS — Z23 ENCOUNTER FOR IMMUNIZATION: Primary | ICD-10-CM

## 2022-04-29 PROCEDURE — 91306 COVID-19 MODERNA VACC 0.25 ML BOOSTER: CPT

## 2022-04-29 PROCEDURE — 0064A COVID-19 MODERNA VACC 0.25 ML BOOSTER: CPT

## 2022-05-13 ENCOUNTER — TELEPHONE (OUTPATIENT)
Dept: HEMATOLOGY ONCOLOGY | Facility: CLINIC | Age: 72
End: 2022-05-13

## 2022-05-13 NOTE — TELEPHONE ENCOUNTER
Scheduling Appointment     Who Is Calling to Schedule patient   Doctor Dr Ramirez Harmon   Date and Time 11/8 @ 315pm   Reason for scheduling appointment 1 yr follow up   Patient verbalized understanding   yes

## 2022-08-04 ENCOUNTER — OFFICE VISIT (OUTPATIENT)
Dept: FAMILY MEDICINE CLINIC | Facility: CLINIC | Age: 72
End: 2022-08-04
Payer: MEDICARE

## 2022-08-04 VITALS
TEMPERATURE: 98.7 F | HEART RATE: 76 BPM | SYSTOLIC BLOOD PRESSURE: 136 MMHG | OXYGEN SATURATION: 97 % | BODY MASS INDEX: 33.13 KG/M2 | DIASTOLIC BLOOD PRESSURE: 80 MMHG | WEIGHT: 180 LBS | HEIGHT: 62 IN

## 2022-08-04 DIAGNOSIS — J06.9 VIRAL URI WITH COUGH: Primary | ICD-10-CM

## 2022-08-04 PROBLEM — H00.025 HORDEOLUM INTERNUM LEFT LOWER EYELID: Status: RESOLVED | Noted: 2022-03-14 | Resolved: 2022-08-04

## 2022-08-04 PROCEDURE — 99213 OFFICE O/P EST LOW 20 MIN: CPT | Performed by: FAMILY MEDICINE

## 2022-08-04 RX ORDER — BENZONATATE 200 MG/1
200 CAPSULE ORAL 3 TIMES DAILY PRN
Qty: 20 CAPSULE | Refills: 0 | Status: SHIPPED | OUTPATIENT
Start: 2022-08-04 | End: 2022-08-30 | Stop reason: ALTCHOICE

## 2022-08-04 RX ORDER — METHYLPREDNISOLONE 4 MG/1
TABLET ORAL
Qty: 21 EACH | Refills: 0 | Status: SHIPPED | OUTPATIENT
Start: 2022-08-04 | End: 2022-08-30 | Stop reason: ALTCHOICE

## 2022-08-04 NOTE — PROGRESS NOTES
Assessment/Plan:  Viral URI with cough  The patient is a 76-year-old female with a history of essential hypertension, hypothyroidism in addition to other who presents today with a cough of over a week's duration  Based on her history and physical examination I believe this represents an upper respiratory infection  We are going to give her a Medrol Dosepak as well as Tessalon for symptomatic relief  She is asked call early next week if her symptoms are not resolving  She is asked call sooner seek more urgent medical attention should her condition worsen  She agrees with this plan  Diagnoses and all orders for this visit:    Viral URI with cough  -     methylPREDNISolone 4 MG tablet therapy pack; Use as directed on package  -     benzonatate (TESSALON) 200 MG capsule; Take 1 capsule (200 mg total) by mouth 3 (three) times a day as needed for cough          Subjective:   Chief Complaint   Patient presents with    Cough     Neg covid test         Patient ID: Jimmie Moore is a 67 y o  female  I have a cough and I bark at night  Over week  Wheezing, Covid negative  Non productive even with expectorant  No fever, No N/V/D  No edema, No CP or SOB  PND, no sneezing  L blockage    HPI  The patient is a 76-year-old female who presents today with concerns over upper respiratory symptoms  She states that she has cough which she attributes to postnasal drip  She notes that she has a barky at night  It has been present for over a week  She has had some minimal wheezing but no shortness of breath  She took 2- COVID test   She has had no fever  No nausea vomiting or diarrhea  No PND orthopnea edema  No wheezing    She does have a feeling of left otic obstruction  The following portions of the patient's history were reviewed and updated as appropriate: allergies, current medications, past family history, past medical history, past social history, past surgical history and problem list     ROS    Per the HPI, otherwise negative  Objective:    Physical Exam  Constitutional:       Appearance: She is not ill-appearing  HENT:      Right Ear: Tympanic membrane normal       Left Ear: Tympanic membrane normal       Nose: Congestion present  Comments: Boggy and erythematous nasal turbinates with mucoid nasal discharge  No paranasal sinus tenderness  Mouth/Throat:      Mouth: Mucous membranes are moist       Pharynx: Oropharynx is clear  No oropharyngeal exudate  Comments: She does have increased Waldeyer's ring  Eyes:      General: No scleral icterus  Conjunctiva/sclera: Conjunctivae normal    Cardiovascular:      Rate and Rhythm: Normal rate and regular rhythm  Heart sounds: No murmur heard  Pulmonary:      Effort: Pulmonary effort is normal       Breath sounds: Normal breath sounds  No wheezing, rhonchi or rales  Musculoskeletal:      Right lower leg: No edema  Left lower leg: No edema  Neurological:      Mental Status: She is alert and oriented to person, place, and time     Psychiatric:      Comments: Anxious appearance which is her baseline

## 2022-08-04 NOTE — ASSESSMENT & PLAN NOTE
The patient is a 50-year-old female with a history of essential hypertension, hypothyroidism in addition to other who presents today with a cough of over a week's duration  Based on her history and physical examination I believe this represents an upper respiratory infection  We are going to give her a Medrol Dosepak as well as Tessalon for symptomatic relief  She is asked call early next week if her symptoms are not resolving  She is asked call sooner seek more urgent medical attention should her condition worsen  She agrees with this plan

## 2022-08-08 DIAGNOSIS — R05.9 COUGH: Primary | ICD-10-CM

## 2022-08-08 RX ORDER — DEXTROMETHORPHAN HYDROBROMIDE AND PROMETHAZINE HYDROCHLORIDE 15; 6.25 MG/5ML; MG/5ML
5 SOLUTION ORAL 4 TIMES DAILY PRN
Qty: 118 ML | Refills: 1 | Status: SHIPPED | OUTPATIENT
Start: 2022-08-08 | End: 2022-08-30 | Stop reason: ALTCHOICE

## 2022-08-09 ENCOUNTER — HOSPITAL ENCOUNTER (OUTPATIENT)
Dept: RADIOLOGY | Facility: HOSPITAL | Age: 72
Discharge: HOME | End: 2022-08-09
Payer: MEDICARE

## 2022-08-09 DIAGNOSIS — R05.9 COUGH: ICD-10-CM

## 2022-08-09 PROCEDURE — 71046 X-RAY EXAM CHEST 2 VIEWS: CPT

## 2022-08-18 ENCOUNTER — RA CDI HCC (OUTPATIENT)
Dept: OTHER | Facility: HOSPITAL | Age: 72
End: 2022-08-18

## 2022-08-18 DIAGNOSIS — E03.9 HYPOTHYROIDISM, UNSPECIFIED TYPE: ICD-10-CM

## 2022-08-18 DIAGNOSIS — I10 ESSENTIAL HYPERTENSION: ICD-10-CM

## 2022-08-18 RX ORDER — LISINOPRIL AND HYDROCHLOROTHIAZIDE 20; 12.5 MG/1; MG/1
TABLET ORAL
Qty: 90 TABLET | Refills: 0 | Status: SHIPPED | OUTPATIENT
Start: 2022-08-18 | End: 2022-09-14 | Stop reason: SDUPTHER

## 2022-08-18 RX ORDER — LEVOTHYROXINE SODIUM 50 UG/1
TABLET ORAL
Qty: 90 TABLET | Refills: 0 | Status: SHIPPED | OUTPATIENT
Start: 2022-08-18 | End: 2022-09-14 | Stop reason: SDUPTHER

## 2022-08-18 NOTE — PROGRESS NOTES
[address]  coding opportunities       Chart reviewed, no opportunity found: CHART REVIEWED, NO OPPORTUNITY FOUND        Patients Insurance     Medicare Insurance: Medicare

## 2022-08-30 ENCOUNTER — OFFICE VISIT (OUTPATIENT)
Dept: FAMILY MEDICINE CLINIC | Facility: CLINIC | Age: 72
End: 2022-08-30
Payer: MEDICARE

## 2022-08-30 VITALS
BODY MASS INDEX: 33.31 KG/M2 | HEIGHT: 62 IN | WEIGHT: 181 LBS | SYSTOLIC BLOOD PRESSURE: 122 MMHG | DIASTOLIC BLOOD PRESSURE: 82 MMHG

## 2022-08-30 DIAGNOSIS — I10 ESSENTIAL HYPERTENSION: ICD-10-CM

## 2022-08-30 DIAGNOSIS — R73.01 IMPAIRED FASTING GLUCOSE: ICD-10-CM

## 2022-08-30 DIAGNOSIS — E80.6 HYPERBILIRUBINEMIA: ICD-10-CM

## 2022-08-30 DIAGNOSIS — M89.8X9 BONE ISLAND: ICD-10-CM

## 2022-08-30 DIAGNOSIS — K76.89 SIMPLE HEPATIC CYST: ICD-10-CM

## 2022-08-30 DIAGNOSIS — E03.9 HYPOTHYROIDISM, UNSPECIFIED TYPE: Primary | ICD-10-CM

## 2022-08-30 PROBLEM — J06.9 VIRAL URI WITH COUGH: Status: RESOLVED | Noted: 2018-03-06 | Resolved: 2022-08-30

## 2022-08-30 PROBLEM — R19.5 LOOSE STOOLS: Status: RESOLVED | Noted: 2022-03-29 | Resolved: 2022-08-30

## 2022-08-30 PROBLEM — M25.473 MILD ANKLE EDEMA: Status: RESOLVED | Noted: 2021-08-30 | Resolved: 2022-08-30

## 2022-08-30 PROCEDURE — 99214 OFFICE O/P EST MOD 30 MIN: CPT | Performed by: FAMILY MEDICINE

## 2022-08-30 PROCEDURE — 36415 COLL VENOUS BLD VENIPUNCTURE: CPT | Performed by: FAMILY MEDICINE

## 2022-08-30 NOTE — PROGRESS NOTES
Assessment/Plan:  Impaired fasting glucose  CMP today  Simple hepatic cyst  She has and appointment next month with Dr Jayda Khalil  Hypothyroidism  She appears most likely euthyroid  Continue levothyroxine and get a TSH today  Essential hypertension  Blood pressure well controlled  Continue with lisinopril/hydrochlorothiazide  CBC and CMP today  Continued efforts at improvement in diet and exercise  Hyperbilirubinemia  CMP today  Continued follow-up with Gastroenterology  Appears to be benign in nature    Bone island  Right 6th rib  Asymptomatic  We recommended DEXA scan as well as pneumococcal vaccination  She states that she will get the Prevnar 20 when she accompanies her  for his visit in the near future  Presently declines DEXA scan  Diagnoses and all orders for this visit:    Hypothyroidism, unspecified type  -     CBC  -     Comprehensive metabolic panel  -     TSH, 3rd generation    Essential hypertension  -     CBC  -     Comprehensive metabolic panel  -     Lipid panel    Impaired fasting glucose    Simple hepatic cyst    Hyperbilirubinemia    Bone island          Subjective:   Chief Complaint   Patient presents with    Follow-up     Med check        Patient ID: Nnamdi Perkins is a 67 y o  female  Cough resolved  No CVP c/o  Dr Bri Paez recommended Metamucil and it has made a huge difference  HPI  The patient is a 50-year-old female who presents today for routine follow-up of multiple medical problems including essential hypertension, primary hypothyroidism, impaired fasting glucose, hepatic cyst, elevated bilirubin among other  She was recently seen for persistent cough  She states that that finally resolved  She presently has no cardiovascular pulmonary complaint  She states that Dr Bri Paez recommended Metamucil knots made a huge improved in her bowel movements  No constitutional symptoms    The following portions of the patient's history were reviewed and updated as appropriate: allergies, current medications, past family history, past medical history, past social history, past surgical history and problem list     ROS    Per the HPI, all other negative  Objective:    Physical Exam  Vitals and nursing note reviewed  Constitutional:       Comments: Overweight and in no distress   Neck:      Vascular: No carotid bruit  Comments: No JVD  Cardiovascular:      Rate and Rhythm: Normal rate and regular rhythm  Pulmonary:      Effort: Pulmonary effort is normal       Breath sounds: Normal breath sounds  Musculoskeletal:      Right lower leg: No edema  Left lower leg: No edema  Lymphadenopathy:      Cervical: No cervical adenopathy  Neurological:      Mental Status: She is alert and oriented to person, place, and time  Psychiatric:         Thought Content:  Thought content normal          Judgment: Judgment normal       Comments: Anxious which is her baseline

## 2022-08-30 NOTE — ASSESSMENT & PLAN NOTE
Blood pressure well controlled  Continue with lisinopril/hydrochlorothiazide  CBC and CMP today  Continued efforts at improvement in diet and exercise

## 2022-08-31 LAB
ALBUMIN SERPL-MCNC: 4.4 G/DL (ref 3.6–5.1)
ALBUMIN/GLOB SERPL: 1.9 (CALC) (ref 1–2.5)
ALP SERPL-CCNC: 94 U/L (ref 37–153)
ALT SERPL-CCNC: 28 U/L (ref 6–29)
AST SERPL-CCNC: 23 U/L (ref 10–35)
BILIRUB SERPL-MCNC: 1.3 MG/DL (ref 0.2–1.2)
BUN SERPL-MCNC: 14 MG/DL (ref 7–25)
BUN/CREAT SERPL: ABNORMAL (CALC) (ref 6–22)
CALCIUM SERPL-MCNC: 9.6 MG/DL (ref 8.6–10.4)
CHLORIDE SERPL-SCNC: 106 MMOL/L (ref 98–110)
CHOLEST SERPL-MCNC: 169 MG/DL
CHOLEST/HDLC SERPL: 2.9 (CALC)
CO2 SERPL-SCNC: 28 MMOL/L (ref 20–32)
CREAT SERPL-MCNC: 0.62 MG/DL (ref 0.6–1)
ERYTHROCYTE [DISTWIDTH] IN BLOOD BY AUTOMATED COUNT: 14 % (ref 11–15)
GFR/BSA.PRED SERPLBLD CYS-BASED-ARV: 94 ML/MIN/1.73M2
GLOBULIN SER CALC-MCNC: 2.3 G/DL (CALC) (ref 1.9–3.7)
GLUCOSE SERPL-MCNC: 103 MG/DL (ref 65–99)
HCT VFR BLD AUTO: 38.7 % (ref 35–45)
HDLC SERPL-MCNC: 58 MG/DL
HGB BLD-MCNC: 12.8 G/DL (ref 11.7–15.5)
LDLC SERPL CALC-MCNC: 86 MG/DL (CALC)
MCH RBC QN AUTO: 31 PG (ref 27–33)
MCHC RBC AUTO-ENTMCNC: 33.1 G/DL (ref 32–36)
MCV RBC AUTO: 93.7 FL (ref 80–100)
NONHDLC SERPL-MCNC: 111 MG/DL (CALC)
PLATELET # BLD AUTO: 325 THOUSAND/UL (ref 140–400)
PMV BLD REES-ECKER: 9.1 FL (ref 7.5–12.5)
POTASSIUM SERPL-SCNC: 4.2 MMOL/L (ref 3.5–5.3)
PROT SERPL-MCNC: 6.7 G/DL (ref 6.1–8.1)
RBC # BLD AUTO: 4.13 MILLION/UL (ref 3.8–5.1)
SODIUM SERPL-SCNC: 140 MMOL/L (ref 135–146)
TRIGL SERPL-MCNC: 148 MG/DL
TSH SERPL-ACNC: 1.17 MIU/L (ref 0.4–4.5)
WBC # BLD AUTO: 6.4 THOUSAND/UL (ref 3.8–10.8)

## 2022-09-14 ENCOUNTER — CLINICAL SUPPORT (OUTPATIENT)
Dept: FAMILY MEDICINE CLINIC | Facility: CLINIC | Age: 72
End: 2022-09-14
Payer: MEDICARE

## 2022-09-14 DIAGNOSIS — I10 ESSENTIAL HYPERTENSION: ICD-10-CM

## 2022-09-14 DIAGNOSIS — Z23 ENCOUNTER FOR IMMUNIZATION: Primary | ICD-10-CM

## 2022-09-14 DIAGNOSIS — E03.9 HYPOTHYROIDISM, UNSPECIFIED TYPE: ICD-10-CM

## 2022-09-14 PROCEDURE — G0009 ADMIN PNEUMOCOCCAL VACCINE: HCPCS

## 2022-09-14 PROCEDURE — 90677 PCV20 VACCINE IM: CPT

## 2022-09-14 RX ORDER — LISINOPRIL AND HYDROCHLOROTHIAZIDE 20; 12.5 MG/1; MG/1
1 TABLET ORAL DAILY
Qty: 90 TABLET | Refills: 0 | Status: SHIPPED | OUTPATIENT
Start: 2022-09-14

## 2022-09-14 RX ORDER — LEVOTHYROXINE SODIUM 0.05 MG/1
50 TABLET ORAL DAILY
Qty: 90 TABLET | Refills: 0 | Status: SHIPPED | OUTPATIENT
Start: 2022-09-14

## 2022-10-19 ENCOUNTER — TELEPHONE (OUTPATIENT)
Dept: SURGICAL ONCOLOGY | Facility: CLINIC | Age: 72
End: 2022-10-19

## 2022-10-19 NOTE — TELEPHONE ENCOUNTER
----- Message from [facility]  sent at 10/19/2022 10:49 AM EDT -----  Pt is seeing me on 11/23  Her CT is ordered but not scheduled  Please call pt to help schedule  Also remind her to get a chest xray (walk-in) when she goes for the CT    Thanks

## 2022-10-19 NOTE — TELEPHONE ENCOUNTER
Called and left message for patient to schedule CT scan and also have chest x-ray completed  Provided number for central scheduling and call back number

## 2022-10-27 ENCOUNTER — TELEPHONE (OUTPATIENT)
Dept: HEMATOLOGY ONCOLOGY | Facility: CLINIC | Age: 72
End: 2022-10-27

## 2022-10-27 NOTE — TELEPHONE ENCOUNTER
Appointment Cancellation Or Reschedule     Person calling in Patient    If other than patient calling, are they listed on the communication consent form? Provider Steve Morton   Office Visit Date and Time 11/23/22   Office Visit Location [place]   Did patient want to reschedule their office appointment? If so, when was it scheduled to? NO   Did you have STAR scheduled for this appointment? no   Do you need STAR set up for your new appointment? If yes, please send to "PATIENT RIDESHARE" pool for STAR rescheduling no   If you are cancelling appointment, can we notify STAR to cancel ride? If yes, please send to "PATIENT RIDESHARE" pool for STAR to cancel service no   Is this patient calling to reschedule an infusion appointment? no   When is their next infusion appointment? no   Is this patient a Chemo patient? no   Reason for Cancellation or Reschedule Patient will call back in spring to reschedule     If the patient is a treatment patient, please route this to the office nurse  If the patient is not on treatment, please route to the office MA  If the patient is a surgical oncology patient, please route to surg/onc clinical pool

## 2022-11-30 ENCOUNTER — TELEPHONE (OUTPATIENT)
Dept: SURGICAL ONCOLOGY | Facility: CLINIC | Age: 72
End: 2022-11-30

## 2022-11-30 NOTE — TELEPHONE ENCOUNTER
----- Message from Teodora Caldwell RN sent at 11/30/2022 10:05 AM EST -----  Patient canceled CT  She needs to reschedule CT and get CXR while she is there  Then follow up with Alma Rosa

## 2022-11-30 NOTE — TELEPHONE ENCOUNTER
Called and left detailed message for patient to reschedule CT scan and get chest x-ray done as well  Left call back number for her to schedule follow up with Alma Rosa and left number for central scheduling

## 2022-12-29 ENCOUNTER — TELEPHONE (OUTPATIENT)
Dept: SURGICAL ONCOLOGY | Facility: CLINIC | Age: 72
End: 2022-12-29

## 2022-12-29 DIAGNOSIS — K76.89 SIMPLE HEPATIC CYST: Primary | ICD-10-CM

## 2022-12-29 NOTE — TELEPHONE ENCOUNTER
New blood work orders placed  Spoke to patient and informed her the last CXR looked good and she does not need to get another

## 2022-12-29 NOTE — TELEPHONE ENCOUNTER
Spoke to pt about CT and CHX appointment and called central scheduling for patient and set  up what was  Needed patient/ for Dr Jolynn Jose- per Erik Pandey, schedule for 1/24/23 at 1 pm and f/u with parish 2/1  Pt was given barium instructions and understood to  from [facility] facility where she is having the CT and CHX  Pt stated she had a CHX in 8/22(illness)  for pcp who is retired now and not sure if she should do the CHX again, also pt stated her blood work [facility] needs to be updated and the  mentioned it needs to be done prior to CT  No orders are in that I can see so I am routing this message to Erik Pandey about the blood work  Pt wants to make sure the CHX is needed even though she had one in 8/22 and blood work for [employer] [facility] and she will get blood work next week if it is sent in

## 2023-01-03 ENCOUNTER — APPOINTMENT (OUTPATIENT)
Dept: LAB | Facility: CLINIC | Age: 73
End: 2023-01-03

## 2023-01-03 DIAGNOSIS — K76.89 SIMPLE HEPATIC CYST: ICD-10-CM

## 2023-01-03 LAB
BUN SERPL-MCNC: 11 MG/DL (ref 5–25)
CREAT SERPL-MCNC: 0.91 MG/DL (ref 0.6–1.3)
GFR SERPL CREATININE-BSD FRML MDRD: 62 ML/MIN/1.73SQ M

## 2023-01-24 ENCOUNTER — HOSPITAL ENCOUNTER (OUTPATIENT)
Dept: RADIOLOGY | Facility: HOSPITAL | Age: 73
Discharge: HOME | End: 2023-01-24

## 2023-01-24 DIAGNOSIS — K76.89 SIMPLE HEPATIC CYST: ICD-10-CM

## 2023-01-24 RX ADMIN — IOHEXOL 100 ML: 350 INJECTION, SOLUTION INTRAVENOUS at 12:16

## 2023-02-01 ENCOUNTER — OFFICE VISIT (OUTPATIENT)
Dept: SURGICAL ONCOLOGY | Facility: CLINIC | Age: 73
End: 2023-02-01

## 2023-02-01 VITALS
BODY MASS INDEX: 32.39 KG/M2 | HEART RATE: 78 BPM | TEMPERATURE: 98 F | SYSTOLIC BLOOD PRESSURE: 184 MMHG | RESPIRATION RATE: 14 BRPM | OXYGEN SATURATION: 98 % | DIASTOLIC BLOOD PRESSURE: 86 MMHG | WEIGHT: 176 LBS | HEIGHT: 62 IN

## 2023-02-01 DIAGNOSIS — K76.89 SIMPLE HEPATIC CYST: Primary | ICD-10-CM

## 2023-02-01 NOTE — PROGRESS NOTES
Surgical Oncology Follow Up       [address] [facility]  [facility] [place]  [address] [facility] [place] [address]    Kaycee Figueredo  1950  [id number]  [id number] [facility]  [facility] [place]  [address] [facility] [address]    Diagnoses and all orders for this visit:    Simple hepatic cyst        Chief Complaint   Patient presents with   • Follow-up       Return for new or worrisome symptoms  History of Present Illness: The patient returns to the office today in follow-up for a large hepatic cyst   This was discovered incidentally on imaging for back pain work-up in July 2020  She denies any symptoms since her last visit, and states she would like to discontinue follow-up  She reports severe anxiety, and does not feel the need for continued imaging for a benign condition  CT of the abdomen and pelvis was performed on January 24, 2023  I have reviewed these results myself and discussed them with the patient and her  today  Review of Systems   Constitutional: Negative  Negative for activity change, appetite change, fatigue and unexpected weight change  HENT: Negative  Respiratory: Negative  Gastrointestinal: Negative  Negative for abdominal distention, abdominal pain and nausea  Musculoskeletal: Negative  Skin: Negative  Negative for color change  Neurological: Negative  Hematological: Negative  Psychiatric/Behavioral: The patient is nervous/anxious  Patient Active Problem List   Diagnosis   • Hypothyroidism   • Essential hypertension   • Cardiac murmur   • Impaired fasting glucose   • Obesity (BMI 30-39  9)   • Screening breast examination   • Dysfunction of left eustachian tube   • Medicare annual wellness visit, subsequent   • Simple hepatic cyst   • Hyperbilirubinemia   • Left foot pain   • Bone island     Past Medical History:   Diagnosis Date   • Disease of thyroid gland    • Hypertension    • Myalgia 8/3/2017   • Right shoulder pain 5/27/2017   • Viral upper respiratory tract infection 3/6/2018   • Viral URI with cough 3/6/2018     Past Surgical History:   Procedure Laterality Date   • HYSTERECTOMY     • OOPHORECTOMY       Family History   Problem Relation Age of Onset   • No Known Problems Mother    • No Known Problems Father    • Thyroid disease Sister    • Thyroid disease Brother    • Thyroid disease Sister    • Thyroid disease Sister    • Thyroid disease Brother    • Thyroid disease Brother      Social History     Socioeconomic History   • Marital status: /Civil Union     Spouse name: Not on file   • Number of children: Not on file   • Years of education: Not on file   • Highest education level: Not on file   Occupational History   • Not on file   Tobacco Use   • Smoking status: Never   • Smokeless tobacco: Never   Vaping Use   • Vaping Use: Never used   Substance and Sexual Activity   • Alcohol use: Never   • Drug use: Never   • Sexual activity: Not Currently   Other Topics Concern   • Not on file   Social History Narrative   • Not on file     Social Determinants of Health     Financial Resource Strain: Not on file   Food Insecurity: Not on file   Transportation Needs: Not on file   Physical Activity: Not on file   Stress: Not on file   Social Connections: Not on file   Intimate Partner Violence: Not on file   Housing Stability: Not on file       Current Outpatient Medications:   •  levothyroxine (Euthyrox) 50 mcg tablet, Take 1 tablet (50 mcg total) by mouth daily, Disp: 90 tablet, Rfl: 0  •  lisinopril-hydrochlorothiazide (PRINZIDE,ZESTORETIC) 20-12 5 MG per tablet, Take 1 tablet by mouth daily, Disp: 90 tablet, Rfl: 0  No Known Allergies  Vitals:    02/01/23 1313   BP: (!) 184/86   Pulse: 78   Resp: 14   Temp: 98 °F (36 7 °C)   SpO2: 98%       Physical Exam  Vitals reviewed  Constitutional:       General: She is not in acute distress       Appearance: Normal appearance  She is normal weight  She is not ill-appearing or toxic-appearing  HENT:      Head: Normocephalic and atraumatic  Eyes:      General: No scleral icterus  Extraocular Movements: Extraocular movements intact  Conjunctiva/sclera: Conjunctivae normal       Pupils: Pupils are equal, round, and reactive to light  Cardiovascular:      Rate and Rhythm: Normal rate  Pulmonary:      Effort: Pulmonary effort is normal    Abdominal:      General: Abdomen is flat  There is no distension  Palpations: Abdomen is soft  There is no mass  Tenderness: There is no abdominal tenderness  There is no guarding  Musculoskeletal:         General: Normal range of motion  Cervical back: Normal range of motion and neck supple  Skin:     General: Skin is warm and dry  Coloration: Skin is not jaundiced  Findings: No bruising  Neurological:      General: No focal deficit present  Mental Status: She is alert and oriented to person, place, and time  Psychiatric:         Mood and Affect: Mood is anxious  Affect is tearful  Thought Content: Thought content normal          Judgment: Judgment normal            Imaging  CT abdomen pelvis w contrast    Result Date: 1/29/2023  Narrative: CT ABDOMEN AND PELVIS WITH IV CONTRAST INDICATION:   K76 [id number]: Other specified diseases of liver  COMPARISON:  CT dated 11/9/2021 TECHNIQUE:  CT examination of the abdomen and pelvis was performed  Axial, sagittal, and coronal 2D reformatted images were created from the source data and submitted for interpretation  Radiation dose length product (DLP) for this visit:  1467 mGy-cm   This examination, like all CT scans performed in the [facility], was performed utilizing techniques to minimize radiation dose exposure, including the use of iterative reconstruction and automated exposure control   IV Contrast:  100 mL of iohexol (OMNIPAQUE) Enteric Contrast:  Enteric contrast was administered  FINDINGS: ABDOMEN LOWER CHEST:  No clinically significant abnormality identified in the visualized lower chest  LIVER/BILIARY TREE:  There are 2 large cysts in the liver including a cyst occupying most of the right lobe and a cyst in segment 4A  The right lobe cyst measures 14 0 x 11 9 x 12 9 cm (previously 13 0 x 11 4 x 11 6 cm)  The segment 4 cyst measures 7 0 x 6 4 x 6 6 cm (previously 7 1 x 6 0 x 6 4 cm)  There is a 1 6 cm cyst in segment IVb  GALLBLADDER:  No calcified gallstones  No pericholecystic inflammatory change  SPLEEN:  Calcified granulomata are noted in the spleen  No suspicious splenic mass  PANCREAS:  Unremarkable  ADRENAL GLANDS:  Unremarkable  KIDNEYS/URETERS:  Unremarkable  No hydronephrosis  STOMACH AND BOWEL:  Unremarkable  APPENDIX:  No findings to suggest appendicitis  ABDOMINOPELVIC CAVITY:  No ascites  No pneumoperitoneum  No lymphadenopathy  VESSELS:  Unremarkable for patient's age  PELVIS REPRODUCTIVE ORGANS:  Surgical changes of prior hysterectomy  URINARY BLADDER:  Unremarkable  ABDOMINAL WALL/INGUINAL REGIONS:  There is a small fat-containing umbilical hernia, unchanged from previous examination  OSSEOUS STRUCTURES:  Degenerative disc disease at L4-5  Impression: Slight increase in size of right lobe hepatic cyst  Grossly stable hepatic segment 4 cyst  Workstation performed: [id number]     I reviewed the above imaging data  Discussion/Summary: This is a 66 y/o female who presents today for continued liver cyst surveillance  Imaging does show mild enlargement of the cyst, but there are no worrisome features, and the patient is completely asymptomatic  I have discussed that intervention, if needed, would likely be marsupialization of the cyst, but that this would generally only be performed if she were having symptoms, such as pain  In her case, we would just continue to observe   She is strongly opposed to any further follow-up, so I have encouraged her to call the office in the future with any concerns or symptoms and directed imaging can then be ordered  She is agreeable to the plan, all questions have been answered today

## 2023-02-04 DIAGNOSIS — E03.9 HYPOTHYROIDISM, UNSPECIFIED TYPE: ICD-10-CM

## 2023-02-04 DIAGNOSIS — Z12.31 BREAST CANCER SCREENING BY MAMMOGRAM: Primary | ICD-10-CM

## 2023-02-04 DIAGNOSIS — I10 ESSENTIAL HYPERTENSION: ICD-10-CM

## 2023-02-04 PROBLEM — R73.03 PREDIABETES: Status: ACTIVE | Noted: 2017-07-03

## 2023-02-04 RX ORDER — LEVOTHYROXINE SODIUM 0.05 MG/1
50 TABLET ORAL DAILY
Qty: 90 TABLET | Refills: 0 | Status: SHIPPED | OUTPATIENT
Start: 2023-02-04

## 2023-02-04 RX ORDER — LISINOPRIL AND HYDROCHLOROTHIAZIDE 20; 12.5 MG/1; MG/1
1 TABLET ORAL DAILY
Qty: 90 TABLET | Refills: 0 | Status: SHIPPED | OUTPATIENT
Start: 2023-02-04

## 2023-02-10 ENCOUNTER — OFFICE VISIT (OUTPATIENT)
Dept: FAMILY MEDICINE CLINIC | Facility: CLINIC | Age: 73
End: 2023-02-10

## 2023-02-10 VITALS
TEMPERATURE: 99.7 F | SYSTOLIC BLOOD PRESSURE: 130 MMHG | HEART RATE: 78 BPM | OXYGEN SATURATION: 98 % | BODY MASS INDEX: 32.85 KG/M2 | HEIGHT: 61 IN | DIASTOLIC BLOOD PRESSURE: 80 MMHG | RESPIRATION RATE: 18 BRPM | WEIGHT: 174 LBS

## 2023-02-10 DIAGNOSIS — E03.8 OTHER SPECIFIED HYPOTHYROIDISM: ICD-10-CM

## 2023-02-10 DIAGNOSIS — E66.9 OBESITY (BMI 30-39.9): ICD-10-CM

## 2023-02-10 DIAGNOSIS — I10 ESSENTIAL HYPERTENSION: ICD-10-CM

## 2023-02-10 DIAGNOSIS — Z00.00 MEDICARE ANNUAL WELLNESS VISIT, SUBSEQUENT: Primary | ICD-10-CM

## 2023-02-10 DIAGNOSIS — R73.03 PREDIABETES: ICD-10-CM

## 2023-02-10 DIAGNOSIS — K76.89 SIMPLE HEPATIC CYST: ICD-10-CM

## 2023-02-10 DIAGNOSIS — Z78.0 POSTMENOPAUSAL STATE: ICD-10-CM

## 2023-02-10 NOTE — PROGRESS NOTES
Assessment/Plan:    No problem-specific Assessment & Plan notes found for this encounter     htn stable, continue meds  Advised diet, low calorie/fat/salt/carb  Drink plenty of water  F/u q6m with labs    Hypothyroid  Has been stable  LT4 dosing reviewed  tsh q6m with labs    Obesity  Suggest exercise gradual wt loss    Liver cyst  Has seen Dr Kelly sosa  Maintain f/u as suggested    Prediabetes  Low carb suggested  Monitor    framingham score advised  Lipid panel reviewed  Discussed statins for risk reduction  Benefits and risks presented  Willing to start low dose statin for fram score 17%    Ca and D intake advised  DEXA ordered     Diagnoses and all orders for this visit:    Medicare annual wellness visit, subsequent    Essential hypertension  -     Comprehensive metabolic panel; Future    Other specified hypothyroidism  -     TSH, 3rd generation; Future    Obesity (BMI 30-39  9)    Simple hepatic cyst    Prediabetes    Postmenopausal state  -     DXA bone density spine hip and pelvis; Future        Return in about 2 months (around 4/27/2023) for OVL  Subjective:      Patient ID: Kaycee Figueredo is a 67 y o  female  Chief Complaint   Patient presents with   • Establish Care     Sas/cma       HPI  No diet  Takes LT4 with other meds  No exercise  Drinks water  No etoh  No tob  No Ca or D supplements currently    The following portions of the patient's history were reviewed and updated as appropriate: allergies, current medications, past family history, past medical history, past social history, past surgical history and problem list     Review of Systems   Constitutional: Negative for chills and fever  Respiratory: Negative for cough and shortness of breath  Cardiovascular: Negative for chest pain           Current Outpatient Medications   Medication Sig Dispense Refill   • levothyroxine (Euthyrox) 50 mcg tablet Take 1 tablet (50 mcg total) by mouth daily 90 tablet 0   • lisinopril-hydrochlorothiazide (PRINZIDE,ZESTORETIC) 20-12 5 MG per tablet Take 1 tablet by mouth daily 90 tablet 0     No current facility-administered medications for this visit  Objective:    /80   Pulse 78   Temp 99 7 °F (37 6 °C)   Resp 18   Ht 5' 1" (1 549 m)   Wt 78 9 kg (174 lb)   SpO2 98%   BMI 32 88 kg/m²        Physical Exam  Vitals and nursing note reviewed  Constitutional:       General: She is not in acute distress  Appearance: She is well-developed  She is obese  She is not ill-appearing  HENT:      Head: Normocephalic  Right Ear: Tympanic membrane and ear canal normal  There is no impacted cerumen  Left Ear: Tympanic membrane and ear canal normal  There is no impacted cerumen  Nose: No congestion  Mouth/Throat:      Pharynx: No oropharyngeal exudate  Eyes:      General: No scleral icterus  Conjunctiva/sclera: Conjunctivae normal    Neck:      Vascular: No carotid bruit  Cardiovascular:      Rate and Rhythm: Normal rate and regular rhythm  Heart sounds: No murmur heard  Pulmonary:      Effort: Pulmonary effort is normal  No respiratory distress  Breath sounds: No wheezing  Abdominal:      General: There is no distension  Palpations: Abdomen is soft  Tenderness: There is no abdominal tenderness  Musculoskeletal:         General: No deformity  Cervical back: Neck supple  Right lower leg: No edema  Left lower leg: No edema  Skin:     General: Skin is warm and dry  Coloration: Skin is not jaundiced or pale  Neurological:      Mental Status: She is alert  Motor: No weakness  Gait: Gait normal    Psychiatric:         Mood and Affect: Mood normal          Behavior: Behavior normal          Thought Content:  Thought content normal                 Tesfaye Ranks, DO

## 2023-02-11 NOTE — PROGRESS NOTES
Assessment and Plan:     Problem List Items Addressed This Visit        Active Problems    Obesity (BMI 30-39  9)    Medicare annual wellness visit, subsequent - Primary    Essential hypertension    Relevant Orders    Comprehensive metabolic panel    Simple hepatic cyst    Other specified hypothyroidism    Relevant Orders    TSH, 3rd generation    Prediabetes    Postmenopausal state    Relevant Orders    DXA bone density spine hip and pelvis        Preventive health issues were discussed with patient, and age appropriate screening tests were ordered as noted in patient's After Visit Summary  Personalized health advice and appropriate referrals for health education or preventive services given if needed, as noted in patient's After Visit Summary  History of Present Illness:     Patient presents for a Medicare Wellness Visit    HPI   Patient Care Team:  Elvia Chilel DO as PCP - General (Family Medicine)     Review of Systems:     Review of Systems     Problem List:     Patient Active Problem List   Diagnosis   • Other specified hypothyroidism   • Essential hypertension   • Cardiac murmur   • Prediabetes   • Obesity (BMI 30-39  9)   • Screening breast examination   • Dysfunction of left eustachian tube   • Medicare annual wellness visit, subsequent   • Simple hepatic cyst   • Hyperbilirubinemia   • Left foot pain   • Bone island   • Breast cancer screening by mammogram   • Postmenopausal state      Past Medical and Surgical History:     Past Medical History:   Diagnosis Date   • Disease of thyroid gland    • Hypertension    • Myalgia 8/3/2017   • Right shoulder pain 5/27/2017   • Viral upper respiratory tract infection 3/6/2018   • Viral URI with cough 3/6/2018     Past Surgical History:   Procedure Laterality Date   • HYSTERECTOMY     • OOPHORECTOMY        Family History:     Family History   Problem Relation Age of Onset   • No Known Problems Mother    • No Known Problems Father    • Thyroid disease Sister • Thyroid disease Brother    • Thyroid disease Sister    • Thyroid disease Sister    • Thyroid disease Brother    • Thyroid disease Brother       Social History:     Social History     Socioeconomic History   • Marital status: /Civil Union     Spouse name: None   • Number of children: None   • Years of education: None   • Highest education level: None   Occupational History   • None   Tobacco Use   • Smoking status: Never   • Smokeless tobacco: Never   Vaping Use   • Vaping Use: Never used   Substance and Sexual Activity   • Alcohol use: Never   • Drug use: Never   • Sexual activity: Not Currently   Other Topics Concern   • None   Social History Narrative   • None     Social Determinants of Health     Financial Resource Strain: Low Risk    • Difficulty of Paying Living Expenses: Not hard at all   Food Insecurity: Not on file   Transportation Needs: No Transportation Needs   • Lack of Transportation (Medical): No   • Lack of Transportation (Non-Medical): No   Physical Activity: Not on file   Stress: Not on file   Social Connections: Not on file   Intimate Partner Violence: Not on file   Housing Stability: Not on file      Medications and Allergies:     Current Outpatient Medications   Medication Sig Dispense Refill   • levothyroxine (Euthyrox) 50 mcg tablet Take 1 tablet (50 mcg total) by mouth daily 90 tablet 0   • lisinopril-hydrochlorothiazide (PRINZIDE,ZESTORETIC) 20-12 5 MG per tablet Take 1 tablet by mouth daily 90 tablet 0     No current facility-administered medications for this visit       No Known Allergies   Immunizations:     Immunization History   Administered Date(s) Administered   • COVID-19 MODERNA VACC 0 25 ML IM BOOSTER 11/05/2021, 04/29/2022   • COVID-19 MODERNA VACC 0 5 ML IM 01/23/2021, 01/23/2021, 02/20/2021, 02/20/2021, 11/05/2021, 04/29/2022   • Influenza, high dose seasonal 0 7 mL 10/05/2020, 10/27/2021   • Pneumococcal Conjugate Vaccine 20-valent (Pcv20), Polysace 09/14/2022, 09/14/2022      Health Maintenance:         Topic Date Due   • Breast Cancer Screening: Mammogram  11/22/2022   • Colorectal Cancer Screening  08/18/2025   • Hepatitis C Screening  Completed         Topic Date Due   • COVID-19 Vaccine (5 - Booster for Moderna series) 06/24/2022   • Influenza Vaccine (1) 09/01/2022      Medicare Screening Tests and Risk Assessments:     Ted is here for her Subsequent Wellness visit  Last Medicare Wellness visit information reviewed, patient interviewed and updates made to the record today  Health Risk Assessment:   Patient rates overall health as very good  Patient feels that their physical health rating is same  Patient is very satisfied with their life  Eyesight was rated as same  Hearing was rated as same  Patient feels that their emotional and mental health rating is slightly better  Patients states they are never, rarely angry  Patient states they are never, rarely unusually tired/fatigued  Pain experienced in the last 7 days has been none  Patient states that she has experienced no weight loss or gain in last 6 months  Fall Risk Screening: In the past year, patient has experienced: no history of falling in past year      Urinary Incontinence Screening:   Patient has not leaked urine accidently in the last six months  Home Safety:  Patient does not have trouble with stairs inside or outside of their home  Patient has working smoke alarms and has working carbon monoxide detector  Home safety hazards include: none  Nutrition:   Current diet is Regular  Medications:   Patient is not currently taking any over-the-counter supplements  Patient is able to manage medications  Activities of Daily Living (ADLs)/Instrumental Activities of Daily Living (IADLs):   Walk and transfer into and out of bed and chair?: Yes  Dress and groom yourself?: Yes    Bathe or shower yourself?: Yes    Feed yourself?  Yes  Do your laundry/housekeeping?: Yes  Manage your money, pay your bills and track your expenses?: Yes  Make your own meals?: Yes    Do your own shopping?: Yes    Previous Hospitalizations:   Any hospitalizations or ED visits within the last 12 months?: No      Advance Care Planning:   Living will: Yes    Durable POA for healthcare: Yes    Advanced directive: Yes    End of Life Decisions reviewed with patient: No      Cognitive Screening:   Provider or family/friend/caregiver concerned regarding cognition?: No    PREVENTIVE SCREENINGS      Cardiovascular Screening:    General: Screening Current      Diabetes Screening:     General: Screening Current      Colorectal Cancer Screening:     General: Screening Current      Breast Cancer Screening:     General: Screening Current      Cervical Cancer Screening:    General: Screening Not Indicated      Osteoporosis Screening:    General: Risks and Benefits Discussed      Abdominal Aortic Aneurysm (AAA) Screening:        General: Screening Not Indicated      Lung Cancer Screening:     General: Screening Not Indicated      Hepatitis C Screening:    General: Screening Current    Hep C Screening Accepted: No     Screening, Brief Intervention, and Referral to Treatment (SBIRT)    Screening  Typical number of drinks in a day: 0  Typical number of drinks in a week: 0  Interpretation: Low risk drinking behavior  Single Item Drug Screening:  How often have you used an illegal drug (including marijuana) or a prescription medication for non-medical reasons in the past year? never    Single Item Drug Screen Score: 0  Interpretation: Negative screen for possible drug use disorder    Other Counseling Topics:   Car/seat belt/driving safety and regular weightbearing exercise and calcium and vitamin D intake  No results found       Physical Exam:     /80   Pulse 78   Temp 99 7 °F (37 6 °C)   Resp 18   Ht 5' 1" (1 549 m)   Wt 78 9 kg (174 lb)   SpO2 98%   BMI 32 88 kg/m²     Physical Exam     Vera Chris, DO

## 2023-02-17 ENCOUNTER — OFFICE VISIT (OUTPATIENT)
Dept: URGENT CARE | Facility: CLINIC | Age: 73
End: 2023-02-17

## 2023-02-17 VITALS
RESPIRATION RATE: 16 BRPM | TEMPERATURE: 99.8 F | BODY MASS INDEX: 32.88 KG/M2 | OXYGEN SATURATION: 96 % | WEIGHT: 174 LBS | HEART RATE: 94 BPM

## 2023-02-17 DIAGNOSIS — R05.1 ACUTE COUGH: ICD-10-CM

## 2023-02-17 DIAGNOSIS — R31.9 HEMATURIA, UNSPECIFIED TYPE: Primary | ICD-10-CM

## 2023-02-17 LAB
SL AMB  POCT GLUCOSE, UA: NORMAL
SL AMB LEUKOCYTE ESTERASE,UA: NORMAL
SL AMB POCT BILIRUBIN,UA: NORMAL
SL AMB POCT BLOOD,UA: NORMAL
SL AMB POCT CLARITY,UA: NORMAL
SL AMB POCT COLOR,UA: NORMAL
SL AMB POCT KETONES,UA: NORMAL
SL AMB POCT NITRITE,UA: NORMAL
SL AMB POCT PH,UA: 8
SL AMB POCT SPECIFIC GRAVITY,UA: 1.01
SL AMB POCT URINE PROTEIN: NORMAL
SL AMB POCT UROBILINOGEN: 0.2

## 2023-02-17 RX ORDER — BENZONATATE 100 MG/1
100 CAPSULE ORAL 3 TIMES DAILY PRN
Qty: 20 CAPSULE | Refills: 0 | Status: SHIPPED | OUTPATIENT
Start: 2023-02-17

## 2023-02-17 RX ORDER — DOXYCYCLINE 100 MG/1
100 CAPSULE ORAL 2 TIMES DAILY
Qty: 14 CAPSULE | Refills: 0 | Status: SHIPPED | OUTPATIENT
Start: 2023-02-17 | End: 2023-02-24

## 2023-02-17 NOTE — PATIENT INSTRUCTIONS
Hematuria   WHAT YOU NEED TO KNOW:   Hematuria is blood in your urine  Your urine may be bright red to dark brown  DISCHARGE INSTRUCTIONS:   Return to the emergency department if:   You have blood in your urine after a new injury, such as a fall  You have severe back or side pain that does not go away with treatment  Call your doctor if:   You are urinating very small amounts or not at all  You feel like you cannot empty your bladder  You have a fever that gets worse or does not go away with treatment  You cannot keep liquids or medicines down  Your urine gets darker, even after you drink extra liquids  You have questions or concerns about your condition, treatment, or care  Drink liquids as directed: You may need to drink extra liquids to help flush the blood from your body through your urine  Water is the best liquid to drink  Ask how much liquid to drink each day and which liquids are best for you  Follow up with your doctor as directed:  Write down your questions so you remember to ask them during your visits  © Copyright [facility] [employer] 2022 Information is for End User's use only and may not be sold, redistributed or otherwise used for commercial purposes  The above information is an  only  It is not intended as medical advice for individual conditions or treatments  Talk to your doctor, nurse or pharmacist before following any medical regimen to see if it is safe and effective for you  Hematuria: The U/A showed no abnormalities  Will send out for culture and treat based on her results  She has no  sx  No CVA tenderness  No abdominal tenderness or pelvic pain  The episode of hematuria was transient and there was no gross blood in her urine today  CT scan recently showed no abnormalities of her abdomen and pelvis  Follow up with PCP for further work up and management       Cough: ddx pneumonia   -Will treat empirically with Doxycyline 100mg PO BID x 7 days for possible pneumonia as she has bilateral crackles and rhonchi on exam today  Take with food and a probiotic  Tessalon perles for the coughing    -Stay very well hydrated and push fluids  -Run a humidifier by your bed and take steam showers to clear mucus   -Zicam nasal AllClear can be soothing to the nasal passages   -You can use flonase once daily for two weeks   -Advil or Tylenol for fever or pain  -Mucinex OTC or Zyrtec or Claritin  Drink plenty of water with the mucinex    -Honey or throat lozenges for cough may be helpful  -Warm salt water gargles and tea with honey   -Obtain a pulse ox device and check your O2 1-2 times a day  You want your oxygen levels to be >93%  If they go below 93% go to the ED immediately  -Vitamin C and D daily   You can attempt to use zinc or Zicam    -If your sx worsen or persist follow up with your PCP for recheck

## 2023-02-17 NOTE — PROGRESS NOTES
[facility]        NAME: Salazar Palacios is a 67 y o  female  : 1950    MRN: [id number]  DATE: 2023  TIME: 6:24 PM    Assessment and Plan   Hematuria, unspecified type [R31 9]  1  Hematuria, unspecified type  POCT urine dip            Patient Instructions   Hematuria: The U/A showed no abnormalities  Will send out for culture and treat based on her results  She has no  sx  No CVA tenderness  No abdominal tenderness or pelvic pain  The episode of hematuria was transient and there was no gross blood in her urine today  CT scan recently showed no abnormalities of her abdomen and pelvis  Follow up with PCP for further work up and management  Cough: ddx pneumonia   -Will treat empirically with Doxycyline 100mg PO BID x 7 days for possible pneumonia as she has bilateral crackles and rhonchi on exam today  Take with food and a probiotic  Tessalon perles for the coughing    -Stay very well hydrated and push fluids  -Run a humidifier by your bed and take steam showers to clear mucus   -Zicam nasal AllClear can be soothing to the nasal passages   -You can use flonase once daily for two weeks   -Advil or Tylenol for fever or pain  -Mucinex OTC or Zyrtec or Claritin  Drink plenty of water with the mucinex    -Honey or throat lozenges for cough may be helpful  -Warm salt water gargles and tea with honey   -Vitamin C and D daily  You can attempt to use zinc or Zicam    -If your sx worsen or persist follow up with your PCP for recheck  Red flag signs discussed  Follow up with PCP in 3-5 days  Proceed to  ER if symptoms worsen  Chief Complaint     Chief Complaint   Patient presents with   • Possible UTI     Pt presents with blood in urine started today; states she was in the shower when bloody urine occured         History of Present Illness       The patient is a 69-year-old female who presents today for transient hematuria that occurred today while she was in the shower   She states that she urinated in the shower and noticed "blood around her feet"  There was no clotting  She has no fever, chills, body aches  No nausea, vomiting, diarrhea  No urgency, frequency, dysuria  No flank pain, abdominal pain, pelvic pain  No abnormal vaginal bleeding or discharge  No vaginal pruritis or irritation  No OTC measures attempted  She has been wearing panty liners due to her coughing which is creating incontinence  She has a hx of total hysterectomy in 1985  She had a CT scan of her abdomen and pelvis on 1/24/23 that showed no abnormalities in her kidneys or bladder  She states that she also presents today for congestion, coughing, rhinorrhea, post nasal drainage x 4 days  She states that the cough is dry  No fever, chills, body aches  No dyspnea, wheezing, cp, palpitations, chest tightness  No GI sx  No loss of taste of smell or taste  No sick contacts or recent travel  Review of Systems   Review of Systems   Constitutional: Negative for activity change, appetite change, chills, diaphoresis, fatigue and fever  HENT: Positive for congestion, postnasal drip and rhinorrhea  Negative for ear discharge, ear pain, facial swelling, hearing loss, sinus pressure, sinus pain, sore throat, tinnitus, trouble swallowing and voice change  Eyes: Negative for visual disturbance  Respiratory: Positive for cough  Negative for apnea, chest tightness, shortness of breath, wheezing and stridor  Cardiovascular: Negative for chest pain, palpitations and leg swelling  Gastrointestinal: Negative for abdominal distention and abdominal pain  Genitourinary: Positive for hematuria  Negative for decreased urine volume, difficulty urinating, dyspareunia, dysuria, flank pain, frequency, genital sores, menstrual problem, pelvic pain, urgency, vaginal bleeding, vaginal discharge and vaginal pain  Musculoskeletal: Negative for arthralgias, joint swelling, myalgias, neck pain and neck stiffness     Skin: Negative for rash    Allergic/Immunologic: Negative for immunocompromised state  Neurological: Negative for dizziness, weakness, light-headedness, numbness and headaches  Hematological: Negative for adenopathy  Current Medications       Current Outpatient Medications:   •  levothyroxine (Euthyrox) 50 mcg tablet, Take 1 tablet (50 mcg total) by mouth daily, Disp: 90 tablet, Rfl: 0  •  lisinopril-hydrochlorothiazide (PRINZIDE,ZESTORETIC) 20-12 5 MG per tablet, Take 1 tablet by mouth daily, Disp: 90 tablet, Rfl: 0    Current Allergies     Allergies as of 02/17/2023   • (No Known Allergies)            The following portions of the patient's history were reviewed and updated as appropriate: allergies, current medications, past family history, past medical history, past social history, past surgical history and problem list      Past Medical History:   Diagnosis Date   • Disease of thyroid gland    • Hypertension    • Myalgia 8/3/2017   • Right shoulder pain 5/27/2017   • Viral upper respiratory tract infection 3/6/2018   • Viral URI with cough 3/6/2018       Past Surgical History:   Procedure Laterality Date   • HYSTERECTOMY     • OOPHORECTOMY         Family History   Problem Relation Age of Onset   • No Known Problems Mother    • No Known Problems Father    • Thyroid disease Sister    • Thyroid disease Brother    • Thyroid disease Sister    • Thyroid disease Sister    • Thyroid disease Brother    • Thyroid disease Brother          Medications have been verified  Objective   Pulse 94   Temp 99 8 °F (37 7 °C)   Resp 16   Wt 78 9 kg (174 lb)   SpO2 96%   BMI 32 88 kg/m²   No LMP recorded  Patient has had a hysterectomy  Physical Exam     Physical Exam  Vitals and nursing note reviewed  Constitutional:       General: She is not in acute distress  Appearance: She is well-developed  She is not ill-appearing, toxic-appearing or diaphoretic  HENT:      Head: Normocephalic and atraumatic        Right Ear: Hearing, tympanic membrane, ear canal and external ear normal       Left Ear: Hearing, tympanic membrane, ear canal and external ear normal       Nose: Nose normal  No mucosal edema or rhinorrhea  Right Sinus: No maxillary sinus tenderness or frontal sinus tenderness  Left Sinus: No maxillary sinus tenderness or frontal sinus tenderness  Mouth/Throat:      Pharynx: Uvula midline  No oropharyngeal exudate, posterior oropharyngeal erythema or uvula swelling  Tonsils: No tonsillar abscesses  Cardiovascular:      Rate and Rhythm: Normal rate and regular rhythm  Heart sounds: S1 normal and S2 normal  Heart sounds not distant  No murmur heard  No friction rub  No gallop  No S3 or S4 sounds  Pulmonary:      Effort: No tachypnea, bradypnea, accessory muscle usage or respiratory distress  Breath sounds: Examination of the right-middle field reveals rales  Examination of the left-middle field reveals rales  Examination of the right-lower field reveals rhonchi and rales  Examination of the left-lower field reveals rhonchi and rales  Rhonchi and rales present  No decreased breath sounds or wheezing  Abdominal:      General: Abdomen is flat  Bowel sounds are normal  There is no distension  Palpations: There is no fluid wave, hepatomegaly, mass or pulsatile mass  Tenderness: There is no abdominal tenderness  There is no right CVA tenderness, left CVA tenderness, guarding or rebound  Negative signs include Alvarez's sign and McBurney's sign  Musculoskeletal:      Cervical back: Normal range of motion and neck supple  Lymphadenopathy:      Cervical: No cervical adenopathy  Neurological:      Mental Status: She is alert and oriented to person, place, and time     Psychiatric:         Behavior: Behavior normal

## 2023-02-19 LAB — BACTERIA UR CULT: NORMAL

## 2023-03-01 ENCOUNTER — OFFICE VISIT (OUTPATIENT)
Dept: URGENT CARE | Facility: CLINIC | Age: 73
End: 2023-03-01

## 2023-03-01 VITALS
TEMPERATURE: 98.3 F | RESPIRATION RATE: 14 BRPM | HEART RATE: 77 BPM | HEIGHT: 61 IN | BODY MASS INDEX: 32.85 KG/M2 | WEIGHT: 174 LBS | SYSTOLIC BLOOD PRESSURE: 145 MMHG | OXYGEN SATURATION: 100 % | DIASTOLIC BLOOD PRESSURE: 65 MMHG

## 2023-03-01 DIAGNOSIS — N30.01 ACUTE CYSTITIS WITH HEMATURIA: Primary | ICD-10-CM

## 2023-03-01 LAB
SL AMB  POCT GLUCOSE, UA: ABNORMAL
SL AMB LEUKOCYTE ESTERASE,UA: ABNORMAL
SL AMB POCT BILIRUBIN,UA: ABNORMAL
SL AMB POCT BLOOD,UA: ABNORMAL
SL AMB POCT CLARITY,UA: ABNORMAL
SL AMB POCT COLOR,UA: ABNORMAL
SL AMB POCT KETONES,UA: ABNORMAL
SL AMB POCT NITRITE,UA: ABNORMAL
SL AMB POCT PH,UA: 6
SL AMB POCT SPECIFIC GRAVITY,UA: 1.02
SL AMB POCT URINE PROTEIN: 30
SL AMB POCT UROBILINOGEN: 0.2

## 2023-03-01 RX ORDER — CEPHALEXIN 500 MG/1
500 CAPSULE ORAL EVERY 12 HOURS SCHEDULED
Qty: 10 CAPSULE | Refills: 0 | Status: SHIPPED | OUTPATIENT
Start: 2023-03-01 | End: 2023-03-06

## 2023-03-01 NOTE — PROGRESS NOTES
[facility]        NAME: Karen Viera is a 67 y o  female  : 1950    MRN: [id number]  DATE: 2023  TIME: 8:52 AM    Assessment and Plan   Acute cystitis with hematuria [N30 01]  1  Acute cystitis with hematuria  POCT urine dip    Urine culture    cephalexin (KEFLEX) 500 mg capsule            Patient Instructions     Patient Instructions   Urine dip suggestive of UTI, to take antibiotic as instructed for the next 5 days, will send urine for culture and follow-up if needed  Encourage follow-up with PCP and possible discussion of urology consult  Discussed with any progression or worsening of symptoms to be seen in ER  All patient's questions answered and is agreeable with this plan  Follow up with PCP in 3-5 days  Proceed to  ER if symptoms worsen  Chief Complaint     Chief Complaint   Patient presents with   • Possible UTI     Pt reports pain, frequent urination and blood in urine since this morning         History of Present Illness       Patient is a 76-year-old female presenting today with UTI symptoms x1 day  Patient notes last night she was experiencing some increased frequency of urination, dysuria and a darkish color of her urine, has had UTIs in the past which have presented similarly, has not taken any medications alleviating factors for symptoms  Denies abdominal pain, flank pain, fever, vaginal discharge or drainage, lightheadedness, dizziness  Review of Systems   Review of Systems   Constitutional: Negative for fever  Respiratory: Negative  Cardiovascular: Negative  Gastrointestinal: Negative for abdominal pain  Genitourinary: Positive for dysuria, frequency and hematuria  Musculoskeletal: Negative for arthralgias  Neurological: Negative for dizziness and light-headedness  All other systems reviewed and are negative          Current Medications       Current Outpatient Medications:   •  cephalexin (KEFLEX) 500 mg capsule, Take 1 capsule (500 mg total) by mouth every 12 (twelve) hours for 5 days, Disp: 10 capsule, Rfl: 0  •  benzonatate (TESSALON PERLES) 100 mg capsule, Take 1 capsule (100 mg total) by mouth 3 (three) times a day as needed for cough, Disp: 20 capsule, Rfl: 0  •  levothyroxine (Euthyrox) 50 mcg tablet, Take 1 tablet (50 mcg total) by mouth daily, Disp: 90 tablet, Rfl: 0  •  lisinopril-hydrochlorothiazide (PRINZIDE,ZESTORETIC) 20-12 5 MG per tablet, Take 1 tablet by mouth daily, Disp: 90 tablet, Rfl: 0    Current Allergies     Allergies as of 03/01/2023   • (No Known Allergies)            The following portions of the patient's history were reviewed and updated as appropriate: allergies, current medications, past family history, past medical history, past social history, past surgical history and problem list      Past Medical History:   Diagnosis Date   • Disease of thyroid gland    • Hypertension    • Myalgia 8/3/2017   • Right shoulder pain 5/27/2017   • Viral upper respiratory tract infection 3/6/2018   • Viral URI with cough 3/6/2018       Past Surgical History:   Procedure Laterality Date   • HYSTERECTOMY     • OOPHORECTOMY         Family History   Problem Relation Age of Onset   • No Known Problems Mother    • No Known Problems Father    • Thyroid disease Sister    • Thyroid disease Brother    • Thyroid disease Sister    • Thyroid disease Sister    • Thyroid disease Brother    • Thyroid disease Brother          Medications have been verified  Objective   /65   Pulse 77   Temp 98 3 °F (36 8 °C)   Resp 14   Ht 5' 1" (1 549 m)   Wt 78 9 kg (174 lb)   SpO2 100%   BMI 32 88 kg/m²        Physical Exam     Physical Exam  Vitals and nursing note reviewed  Constitutional:       General: She is not in acute distress  Appearance: Normal appearance  HENT:      Head: Normocephalic and atraumatic        Right Ear: External ear normal       Left Ear: External ear normal    Eyes:      Conjunctiva/sclera: Conjunctivae normal  Cardiovascular:      Rate and Rhythm: Normal rate and regular rhythm  Pulses: Normal pulses  Heart sounds: Normal heart sounds  Pulmonary:      Effort: Pulmonary effort is normal       Breath sounds: Normal breath sounds  Abdominal:      General: Abdomen is flat  Bowel sounds are normal       Palpations: Abdomen is soft  Tenderness: There is no abdominal tenderness  There is no right CVA tenderness or left CVA tenderness  Skin:     General: Skin is warm  Capillary Refill: Capillary refill takes less than 2 seconds  Neurological:      General: No focal deficit present  Mental Status: She is alert and oriented to person, place, and time

## 2023-03-01 NOTE — PATIENT INSTRUCTIONS
Urine dip suggestive of UTI, to take antibiotic as instructed for the next 5 days, will send urine for culture and follow-up if needed  Encourage follow-up with PCP and possible discussion of urology consult  Discussed with any progression or worsening of symptoms to be seen in ER  All patient's questions answered and is agreeable with this plan

## 2023-03-03 LAB
BACTERIA UR CULT: ABNORMAL
BACTERIA UR CULT: ABNORMAL

## 2023-03-21 ENCOUNTER — HOSPITAL ENCOUNTER (OUTPATIENT)
Dept: RADIOLOGY | Facility: HOSPITAL | Age: 73
Discharge: HOME | End: 2023-03-21
Attending: FAMILY MEDICINE

## 2023-03-21 ENCOUNTER — APPOINTMENT (OUTPATIENT)
Dept: LAB | Facility: CLINIC | Age: 73
End: 2023-03-21

## 2023-03-21 VITALS — BODY MASS INDEX: 32.85 KG/M2 | WEIGHT: 174 LBS | HEIGHT: 61 IN

## 2023-03-21 DIAGNOSIS — Z12.31 BREAST CANCER SCREENING BY MAMMOGRAM: ICD-10-CM

## 2023-03-21 DIAGNOSIS — I10 ESSENTIAL HYPERTENSION: ICD-10-CM

## 2023-03-21 DIAGNOSIS — E03.8 OTHER SPECIFIED HYPOTHYROIDISM: ICD-10-CM

## 2023-03-21 LAB
ALBUMIN SERPL BCP-MCNC: 4.3 G/DL (ref 3.5–5)
ALP SERPL-CCNC: 90 U/L (ref 46–116)
ALT SERPL W P-5'-P-CCNC: 28 U/L (ref 12–78)
ANION GAP SERPL CALCULATED.3IONS-SCNC: 3 MMOL/L (ref 4–13)
AST SERPL W P-5'-P-CCNC: 27 U/L (ref 5–45)
BILIRUB SERPL-MCNC: 1.35 MG/DL (ref 0.2–1)
BUN SERPL-MCNC: 14 MG/DL (ref 5–25)
CALCIUM SERPL-MCNC: 9.4 MG/DL (ref 8.3–10.1)
CHLORIDE SERPL-SCNC: 107 MMOL/L (ref 96–108)
CO2 SERPL-SCNC: 26 MMOL/L (ref 21–32)
CREAT SERPL-MCNC: 0.75 MG/DL (ref 0.6–1.3)
GFR SERPL CREATININE-BSD FRML MDRD: 78 ML/MIN/1.73SQ M
GLUCOSE P FAST SERPL-MCNC: 106 MG/DL (ref 65–99)
POTASSIUM SERPL-SCNC: 5 MMOL/L (ref 3.5–5.3)
PROT SERPL-MCNC: 7.6 G/DL (ref 6.4–8.4)
SODIUM SERPL-SCNC: 136 MMOL/L (ref 135–147)
TSH SERPL DL<=0.05 MIU/L-ACNC: 1.03 UIU/ML (ref 0.45–4.5)

## 2023-04-08 PROBLEM — N18.2 CKD (CHRONIC KIDNEY DISEASE) STAGE 2, GFR 60-89 ML/MIN: Status: ACTIVE | Noted: 2023-04-08

## 2023-04-11 PROBLEM — Z13.83 SCREENING FOR CARDIOVASCULAR, RESPIRATORY, AND GENITOURINARY DISEASES: Status: ACTIVE | Noted: 2023-04-11

## 2023-04-11 PROBLEM — Z91.89 FRAMINGHAM CARDIAC RISK >20% IN NEXT 10 YEARS: Status: ACTIVE | Noted: 2023-04-11

## 2023-04-11 PROBLEM — Z13.6 SCREENING FOR CARDIOVASCULAR, RESPIRATORY, AND GENITOURINARY DISEASES: Status: ACTIVE | Noted: 2023-04-11

## 2023-04-11 PROBLEM — Z13.89 SCREENING FOR CARDIOVASCULAR, RESPIRATORY, AND GENITOURINARY DISEASES: Status: ACTIVE | Noted: 2023-04-11

## 2023-05-03 ENCOUNTER — OFFICE VISIT (OUTPATIENT)
Dept: FAMILY MEDICINE CLINIC | Facility: CLINIC | Age: 73
End: 2023-05-03

## 2023-05-03 VITALS
WEIGHT: 174 LBS | HEART RATE: 85 BPM | SYSTOLIC BLOOD PRESSURE: 150 MMHG | OXYGEN SATURATION: 98 % | TEMPERATURE: 97.2 F | DIASTOLIC BLOOD PRESSURE: 80 MMHG | HEIGHT: 61 IN | RESPIRATION RATE: 16 BRPM | BODY MASS INDEX: 32.85 KG/M2

## 2023-05-03 DIAGNOSIS — R22.0 HEAD LUMP: Primary | ICD-10-CM

## 2023-05-03 DIAGNOSIS — E03.8 OTHER SPECIFIED HYPOTHYROIDISM: ICD-10-CM

## 2023-05-03 DIAGNOSIS — I10 ESSENTIAL HYPERTENSION: ICD-10-CM

## 2023-05-03 NOTE — PROGRESS NOTES
"Assessment/Plan:    No problem-specific Assessment & Plan notes found for this encounter  Right occiput soft tissue mass, suspect lipoma, doubt LA, check US, consider surgical removal as symptomatic and enlarging lately  Agreeable to US then I can try to contact Dr Taras Toro if he would do excision vs refer to general surgeon    Htn/thyroid stable     Diagnoses and all orders for this visit:    Head lump  -     US head neck soft tissue; Future    Other specified hypothyroidism    Essential hypertension        Return if symptoms worsen or fail to improve  Subjective:      Patient ID: Larry Odonnell is a 75 y o  female  Chief Complaint   Patient presents with    Mass     Back of the head  Now causing headaches and pain  Carol Mak MA         HPI  Headache  Sharp, shooting, crawling sensation  Right occiput  [facility] now size of golf[facility], usama in past year  the lump has been there for years but smaller before    The following portions of the patient's history were reviewed and updated as appropriate: allergies, current medications, past family history, past medical history, past social history, past surgical history and problem list     Review of Systems   Constitutional: Negative for chills, fever and unexpected weight change  Current Outpatient Medications   Medication Sig Dispense Refill    levothyroxine (Euthyrox) 50 mcg tablet Take 1 tablet (50 mcg total) by mouth daily 90 tablet 1    lisinopril-hydrochlorothiazide (PRINZIDE,ZESTORETIC) 20-12 5 MG per tablet Take 1 tablet by mouth daily 90 tablet 1     No current facility-administered medications for this visit  Objective:    /80   Pulse 85   Temp (!) 97 2 °F (36 2 °C)   Resp 16   Ht 5' 1 25\" (1 556 m)   Wt 78 9 kg (174 lb)   SpO2 98%   BMI 32 61 kg/m²        Physical Exam  Vitals and nursing note reviewed  Constitutional:       General: She is not in acute distress       Appearance: She is " well-developed  She is not ill-appearing  HENT:      Head: Normocephalic  Right Ear: Tympanic membrane normal       Left Ear: Tympanic membrane normal    Eyes:      Conjunctiva/sclera: Conjunctivae normal    Cardiovascular:      Rate and Rhythm: Normal rate and regular rhythm  Pulmonary:      Effort: Pulmonary effort is normal  No respiratory distress  Breath sounds: No wheezing  Abdominal:      Palpations: Abdomen is soft  Musculoskeletal:         General: No deformity  Cervical back: Neck supple  Skin:     General: Skin is warm and dry  Coloration: Skin is not pale  Findings: No lesion  Comments: Soft tissue lump with lipoma texture right occiput    No cervical, supraclavicular, axillary or inguinal lymphadenopathy  Neurological:      Mental Status: She is alert  Motor: No weakness  Gait: Gait normal    Psychiatric:         Behavior: Behavior normal          Thought Content:  Thought content normal                 Maritza Proper, DO

## 2023-05-05 ENCOUNTER — HOSPITAL ENCOUNTER (OUTPATIENT)
Dept: RADIOLOGY | Facility: HOSPITAL | Age: 73
Discharge: HOME | End: 2023-05-05
Attending: FAMILY MEDICINE

## 2023-05-05 DIAGNOSIS — R22.0 HEAD LUMP: ICD-10-CM

## 2023-05-08 ENCOUNTER — TELEPHONE (OUTPATIENT)
Dept: SURGICAL ONCOLOGY | Facility: CLINIC | Age: 73
End: 2023-05-08

## 2023-05-08 NOTE — TELEPHONE ENCOUNTER
Spoke to patient to set up follow up with Dr Winter Brennan on back of head causing some pain and headaches per patient and she is grateful for the appointment 5/26 at 3:45 pm

## 2023-05-23 PROBLEM — D17.0 LIPOMA OF NECK: Status: ACTIVE | Noted: 2023-05-23

## 2023-05-24 ENCOUNTER — HOSPITAL ENCOUNTER (OUTPATIENT)
Dept: RADIOLOGY | Facility: HOSPITAL | Age: 73
Discharge: HOME | End: 2023-05-24
Attending: SURGERY

## 2023-05-24 ENCOUNTER — APPOINTMENT (OUTPATIENT)
Dept: LAB | Facility: CLINIC | Age: 73
End: 2023-05-24

## 2023-05-24 ENCOUNTER — OFFICE VISIT (OUTPATIENT)
Dept: LAB | Facility: CLINIC | Age: 73
End: 2023-05-24

## 2023-05-24 ENCOUNTER — OFFICE VISIT (OUTPATIENT)
Dept: SURGICAL ONCOLOGY | Facility: CLINIC | Age: 73
End: 2023-05-24

## 2023-05-24 VITALS
SYSTOLIC BLOOD PRESSURE: 136 MMHG | HEIGHT: 61 IN | OXYGEN SATURATION: 98 % | HEART RATE: 69 BPM | RESPIRATION RATE: 16 BRPM | BODY MASS INDEX: 32.85 KG/M2 | WEIGHT: 174 LBS | DIASTOLIC BLOOD PRESSURE: 68 MMHG | TEMPERATURE: 98.3 F

## 2023-05-24 DIAGNOSIS — D17.39 BENIGN LIPOMATOUS NEOPLASM OF SKIN AND SUBCUTANEOUS TISSUE OF OTHER SITES: ICD-10-CM

## 2023-05-24 DIAGNOSIS — D17.0 LIPOMA OF NECK: Primary | ICD-10-CM

## 2023-05-24 DIAGNOSIS — D17.0 LIPOMA OF NECK: ICD-10-CM

## 2023-05-24 LAB
ALBUMIN SERPL BCP-MCNC: 4.4 G/DL (ref 3.5–5)
ALP SERPL-CCNC: 80 U/L (ref 34–104)
ALT SERPL W P-5'-P-CCNC: 18 U/L (ref 7–52)
ANION GAP SERPL CALCULATED.3IONS-SCNC: 7 MMOL/L (ref 4–13)
AST SERPL W P-5'-P-CCNC: 21 U/L (ref 13–39)
BASOPHILS # BLD AUTO: 0.09 THOUSANDS/ÂΜL (ref 0–0.1)
BASOPHILS NFR BLD AUTO: 1 % (ref 0–1)
BILIRUB SERPL-MCNC: 1.33 MG/DL (ref 0.2–1)
BUN SERPL-MCNC: 16 MG/DL (ref 5–25)
CALCIUM SERPL-MCNC: 9.4 MG/DL (ref 8.4–10.2)
CHLORIDE SERPL-SCNC: 104 MMOL/L (ref 96–108)
CO2 SERPL-SCNC: 26 MMOL/L (ref 21–32)
CREAT SERPL-MCNC: 0.65 MG/DL (ref 0.6–1.3)
EOSINOPHIL # BLD AUTO: 0.11 THOUSAND/ÂΜL (ref 0–0.61)
EOSINOPHIL NFR BLD AUTO: 1 % (ref 0–6)
ERYTHROCYTE [DISTWIDTH] IN BLOOD BY AUTOMATED COUNT: 16.2 % (ref 11.6–15.1)
GFR SERPL CREATININE-BSD FRML MDRD: 87 ML/MIN/1.73SQ M
GLUCOSE SERPL-MCNC: 89 MG/DL (ref 65–140)
HCT VFR BLD AUTO: 35.8 % (ref 34.8–46.1)
HGB BLD-MCNC: 12.3 G/DL (ref 11.5–15.4)
IMM GRANULOCYTES # BLD AUTO: 0.03 THOUSAND/UL (ref 0–0.2)
IMM GRANULOCYTES NFR BLD AUTO: 0 % (ref 0–2)
LYMPHOCYTES # BLD AUTO: 2.83 THOUSANDS/ÂΜL (ref 0.6–4.47)
LYMPHOCYTES NFR BLD AUTO: 30 % (ref 14–44)
MCH RBC QN AUTO: 31.1 PG (ref 26.8–34.3)
MCHC RBC AUTO-ENTMCNC: 34.5 G/DL (ref 31.4–37.4)
MCV RBC AUTO: 91 FL (ref 82–98)
MONOCYTES # BLD AUTO: 0.57 THOUSAND/ÂΜL (ref 0.17–1.22)
MONOCYTES NFR BLD AUTO: 6 % (ref 4–12)
NEUTROPHILS # BLD AUTO: 5.89 THOUSANDS/ÂΜL (ref 1.85–7.62)
NEUTS SEG NFR BLD AUTO: 62 % (ref 43–75)
NRBC BLD AUTO-RTO: 0 /100 WBCS
PLATELET # BLD AUTO: 354 THOUSANDS/UL (ref 149–390)
PMV BLD AUTO: 9.9 FL (ref 8.9–12.7)
POTASSIUM SERPL-SCNC: 4 MMOL/L (ref 3.5–5.3)
PROT SERPL-MCNC: 7 G/DL (ref 6.4–8.4)
RBC # BLD AUTO: 3.95 MILLION/UL (ref 3.81–5.12)
SODIUM SERPL-SCNC: 137 MMOL/L (ref 135–147)
WBC # BLD AUTO: 9.52 THOUSAND/UL (ref 4.31–10.16)

## 2023-05-24 RX ORDER — OXYCODONE HYDROCHLORIDE 5 MG/1
5 TABLET ORAL EVERY 6 HOURS PRN
Qty: 10 TABLET | Refills: 0 | Status: SHIPPED | OUTPATIENT
Start: 2023-05-24

## 2023-05-24 NOTE — H&P (VIEW-ONLY)
Surgical Oncology Follow Up       [address] [facility]  [facility] [place]  [address] [facility] [address]    Hanny Wise  1950  [id number]  [address] [facility]  [facility] [place]  [address]  [facility] [address]    Diagnoses and all orders for this visit:    Lipoma of neck  -     Case request operating room: EXCISION OF POSTERIOR NECK MASS; Standing  -     Comprehensive metabolic panel; Future  -     CBC and differential; Future  -     EKG 12 lead; Future  -     XR chest pa & lateral; Future  -     oxyCODONE (Roxicodone) 5 immediate release tablet; Take 1 tablet (5 mg total) by mouth every 6 (six) hours as needed for moderate pain Max Daily Amount: 20 mg  -     Case request operating room: EXCISION OF POSTERIOR NECK MASS    Benign lipomatous neoplasm of skin and subcutaneous tissue of other sites  -     CBC and differential; Future    Other orders  -     Incentive spirometry; Standing  -     Insert and maintain IV line; Standing  -     Void On-Call to O R ; Standing  -     Place sequential compression device; Standing  -     ceFAZolin (ANCEF) 1,000 mg in dextrose 5 % 100 mL IVPB        Chief Complaint   Patient presents with   • Follow-up       No follow-ups on file  Oncology History    No history exists  History of Present Illness: Patient returns in follow-up because she has noticed a mass on her posterior neck  She thinks over the last several weeks this has increased in size  It does cause her some pain when she sleeps on it  Review of Systems  Complete ROS Surg Onc:   Complete ROS Surg Onc:   Constitutional: The patient denies new or recent history of general fatigue, no recent weight loss, no change in appetite  Eyes: No complaints of visual problems, no scleral icterus     ENT: no complaints of ear pain, no hoarseness, no difficulty swallowing,  no tinnitus and no new masses in head, oral cavity, or neck  Cardiovascular: No complaints of chest pain, no palpitations, no ankle edema  Respiratory: No complaints of shortness of breath, no cough  Gastrointestinal: No complaints of jaundice, no bloody stools, no pale stools  Genitourinary: No complaints of dysuria, no hematuria, no nocturia, no frequent urination, no urethral discharge  Musculoskeletal: No complaints of weakness, paralysis, joint stiffness or arthralgias  Integumentary: No complaints of rash, no new lesions  Neurological: No complaints of convulsions, no seizures, no dizziness  Hematologic/Lymphatic: No complaints of easy bruising  Endocrine:  No hot or cold intolerance  No polydipsia, polyphagia, or polyuria  Allergy/immunology:  No environmental allergies  No food allergies  Not immunocompromised  Skin:  No pallor or rash  No wound  Patient Active Problem List   Diagnosis   • Other specified hypothyroidism   • Essential hypertension   • Cardiac murmur   • Prediabetes   • Obesity (BMI 30-39  9)   • Screening breast examination   • Dysfunction of left eustachian tube   • Medicare annual wellness visit, subsequent   • Simple hepatic cyst   • Hyperbilirubinemia   • Left foot pain   • Bone island   • Breast cancer screening by mammogram   • Postmenopausal state   • CKD (chronic kidney disease) stage 2, GFR 60-89 ml/min   • Screening for cardiovascular, respiratory, and genitourinary diseases   • [place] cardiac risk >20% in next 10 years   • Head lump   • Lipoma of neck     Past Medical History:   Diagnosis Date   • Disease of thyroid gland    • Hypertension    • Myalgia 8/3/2017   • Right shoulder pain 5/27/2017   • Viral upper respiratory tract infection 3/6/2018   • Viral URI with cough 3/6/2018     Past Surgical History:   Procedure Laterality Date   • HYSTERECTOMY     • OOPHORECTOMY       Family History   Problem Relation Age of Onset   • No Known Problems Mother    • No Known Problems Father • Thyroid disease Sister    • Thyroid disease Brother    • Thyroid disease Sister    • Thyroid disease Sister    • Thyroid disease Brother    • Thyroid disease Brother      Social History     Socioeconomic History   • Marital status: /Civil Union     Spouse name: Not on file   • Number of children: Not on file   • Years of education: Not on file   • Highest education level: Not on file   Occupational History   • Not on file   Tobacco Use   • Smoking status: Never   • Smokeless tobacco: Never   Vaping Use   • Vaping Use: Never used   Substance and Sexual Activity   • Alcohol use: Never   • Drug use: Never   • Sexual activity: Not Currently   Other Topics Concern   • Not on file   Social History Narrative   • Not on file     Social Determinants of Health     Financial Resource Strain: Low Risk  (2/10/2023)    Overall Financial Resource Strain (CARDIA)    • Difficulty of Paying Living Expenses: Not hard at all   Food Insecurity: Not on file   Transportation Needs: No Transportation Needs (2/10/2023)    PRAPARE - Transportation    • Lack of Transportation (Medical): No    • Lack of Transportation (Non-Medical):  No   Physical Activity: Not on file   Stress: Not on file   Social Connections: Not on file   Intimate Partner Violence: Not on file   Housing Stability: Not on file       Current Outpatient Medications:   •  levothyroxine (Euthyrox) 50 mcg tablet, Take 1 tablet (50 mcg total) by mouth daily, Disp: 90 tablet, Rfl: 1  •  lisinopril-hydrochlorothiazide (PRINZIDE,ZESTORETIC) 20-12 5 MG per tablet, Take 1 tablet by mouth daily, Disp: 90 tablet, Rfl: 1  •  oxyCODONE (Roxicodone) 5 immediate release tablet, Take 1 tablet (5 mg total) by mouth every 6 (six) hours as needed for moderate pain Max Daily Amount: 20 mg, Disp: 10 tablet, Rfl: 0  No Known Allergies  Vitals:    05/24/23 1531   BP: 136/68   Pulse: 69   Resp: 16   Temp: 98 3 °F (36 8 °C)   SpO2: 98%       Physical Exam  Constitutional: General appearance: The Patient is well-developed and well-nourished who appears the stated age in no acute distress  Patient is pleasant and talkative  HEENT:  Normocephalic  Sclerae are anicteric  Mucous membranes are moist  Neck is supple without adenopathy  No JVD  There is a 4 cm soft tissue mass on her posterior neck  This is consistent with a lipoma  Chest: The lungs are clear to auscultation  Cardiac: Heart is regular rate  Extremities: There is no clubbing or cyanosis  There is no edema  Symmetric  Neuro: Grossly nonfocal  Gait is normal      Lymphatic: No evidence of cervical adenopathy bilaterally  Skin: Warm, anicteric  Psych:  Patient is pleasant and talkative  Breasts:        Pathology:  [unfilled]    Labs:      Imaging  US head neck soft tissue    Result Date: 5/5/2023  Narrative: POSTERIOR NECK ULTRASOUND INDICATION: R22 0: Localized swelling, mass and lump, head  COMPARISON:  None TECHNIQUE:   Real-time ultrasound of the posterior neck in the region of palpable concern was performed with a linear transducer with both volumetric sweeps and still imaging techniques  FINDINGS: In the right occipital region area of palpable concern is 4 7 x 1 3 x 4 6 cm ovoid structure with ultrasonographic features similar to subcutaneous fat and absence of internal vascularity, findings most compatible with lipoma  Impression: Palpable abnormality appears to correspond to a 4 7 cm lipoma  Correlate with palpatory findings  Followup, if any, may be based on clinical grounds (i e , interval growth or discordance between suspected ultrasonographic diagnosis and clinical findings  If there is high index of clinical suspicion, correlation with either contrast-enhanced CT or MRI could be obtained for more definitive characterization  Workstation performed: [id number]     I reviewed the above laboratory and imaging data      Discussion/Summary: 21-year-old female with a history of a liver cyst  She now has a enlarging posterior neck mass  This is likely a lipoma  Since it is symptomatic I have recommended resection  I explained the risks of excision of her posterior neck mass to include bleeding, infection, recurrence, need for further surgery and wound complications  Informed consent was obtained  She was also instructed that she may require a drain  We will schedule this at our earliest mutual convenience  She is agreeable to this  All her questions were answered

## 2023-05-24 NOTE — PROGRESS NOTES
Surgical Oncology Follow Up       [address]  [facility]  [address] [address]    Fredo Crabtree  1950  [id number]  1152 [id number] [facility]  [facility]  [address] [address]    Diagnoses and all orders for this visit:    Lipoma of neck  -     Case request operating room: EXCISION OF POSTERIOR NECK MASS; Standing  -     Comprehensive metabolic panel; Future  -     CBC and differential; Future  -     EKG 12 lead; Future  -     XR chest pa & lateral; Future  -     oxyCODONE (Roxicodone) 5 immediate release tablet; Take 1 tablet (5 mg total) by mouth every 6 (six) hours as needed for moderate pain Max Daily Amount: 20 mg  -     Case request operating room: EXCISION OF POSTERIOR NECK MASS    Benign lipomatous neoplasm of skin and subcutaneous tissue of other sites  -     CBC and differential; Future    Other orders  -     Incentive spirometry; Standing  -     Insert and maintain IV line; Standing  -     Void On-Call to O R ; Standing  -     Place sequential compression device; Standing  -     ceFAZolin (ANCEF) 1,000 mg in dextrose 5 % 100 mL IVPB        Chief Complaint   Patient presents with   • Follow-up       No follow-ups on file  Oncology History    No history exists  History of Present Illness: Patient returns in follow-up because she has noticed a mass on her posterior neck  She thinks over the last several weeks this has increased in size  It does cause her some pain when she sleeps on it  Review of Systems  Complete ROS Surg Onc:   Complete ROS Surg Onc:   Constitutional: The patient denies new or recent history of general fatigue, no recent weight loss, no change in appetite  Eyes: No complaints of visual problems, no scleral icterus     ENT: no complaints of ear pain, no hoarseness, no difficulty swallowing,  no tinnitus and no new masses in head, oral cavity, or neck  Cardiovascular: No complaints of chest pain, no palpitations, no ankle edema  Respiratory: No complaints of shortness of breath, no cough  Gastrointestinal: No complaints of jaundice, no bloody stools, no pale stools  Genitourinary: No complaints of dysuria, no hematuria, no nocturia, no frequent urination, no urethral discharge  Musculoskeletal: No complaints of weakness, paralysis, joint stiffness or arthralgias  Integumentary: No complaints of rash, no new lesions  Neurological: No complaints of convulsions, no seizures, no dizziness  Hematologic/Lymphatic: No complaints of easy bruising  Endocrine:  No hot or cold intolerance  No polydipsia, polyphagia, or polyuria  Allergy/immunology:  No environmental allergies  No food allergies  Not immunocompromised  Skin:  No pallor or rash  No wound  Patient Active Problem List   Diagnosis   • Other specified hypothyroidism   • Essential hypertension   • Cardiac murmur   • Prediabetes   • Obesity (BMI 30-39  9)   • Screening breast examination   • Dysfunction of left eustachian tube   • Medicare annual wellness visit, subsequent   • Simple hepatic cyst   • Hyperbilirubinemia   • Left foot pain   • Bone island   • Breast cancer screening by mammogram   • Postmenopausal state   • CKD (chronic kidney disease) stage 2, GFR 60-89 ml/min   • Screening for cardiovascular, respiratory, and genitourinary diseases   • [place] cardiac risk >20% in next 10 years   • Head lump   • Lipoma of neck     Past Medical History:   Diagnosis Date   • Disease of thyroid gland    • Hypertension    • Myalgia 8/3/2017   • Right shoulder pain 5/27/2017   • Viral upper respiratory tract infection 3/6/2018   • Viral URI with cough 3/6/2018     Past Surgical History:   Procedure Laterality Date   • HYSTERECTOMY     • OOPHORECTOMY       Family History   Problem Relation Age of Onset   • No Known Problems Mother    • No Known Problems Father • Thyroid disease Sister    • Thyroid disease Brother    • Thyroid disease Sister    • Thyroid disease Sister    • Thyroid disease Brother    • Thyroid disease Brother      Social History     Socioeconomic History   • Marital status: /Civil Union     Spouse name: Not on file   • Number of children: Not on file   • Years of education: Not on file   • Highest education level: Not on file   Occupational History   • Not on file   Tobacco Use   • Smoking status: Never   • Smokeless tobacco: Never   Vaping Use   • Vaping Use: Never used   Substance and Sexual Activity   • Alcohol use: Never   • Drug use: Never   • Sexual activity: Not Currently   Other Topics Concern   • Not on file   Social History Narrative   • Not on file     Social Determinants of Health     Financial Resource Strain: Low Risk  (2/10/2023)    Overall Financial Resource Strain (CARDIA)    • Difficulty of Paying Living Expenses: Not hard at all   Food Insecurity: Not on file   Transportation Needs: No Transportation Needs (2/10/2023)    PRAPARE - Transportation    • Lack of Transportation (Medical): No    • Lack of Transportation (Non-Medical):  No   Physical Activity: Not on file   Stress: Not on file   Social Connections: Not on file   Intimate Partner Violence: Not on file   Housing Stability: Not on file       Current Outpatient Medications:   •  levothyroxine (Euthyrox) 50 mcg tablet, Take 1 tablet (50 mcg total) by mouth daily, Disp: 90 tablet, Rfl: 1  •  lisinopril-hydrochlorothiazide (PRINZIDE,ZESTORETIC) 20-12 5 MG per tablet, Take 1 tablet by mouth daily, Disp: 90 tablet, Rfl: 1  •  oxyCODONE (Roxicodone) 5 immediate release tablet, Take 1 tablet (5 mg total) by mouth every 6 (six) hours as needed for moderate pain Max Daily Amount: 20 mg, Disp: 10 tablet, Rfl: 0  No Known Allergies  Vitals:    05/24/23 1531   BP: 136/68   Pulse: 69   Resp: 16   Temp: 98 3 °F (36 8 °C)   SpO2: 98%       Physical Exam  Constitutional: General appearance: The Patient is well-developed and well-nourished who appears the stated age in no acute distress  Patient is pleasant and talkative  HEENT:  Normocephalic  Sclerae are anicteric  Mucous membranes are moist  Neck is supple without adenopathy  No JVD  There is a 4 cm soft tissue mass on her posterior neck  This is consistent with a lipoma  Chest: The lungs are clear to auscultation  Cardiac: Heart is regular rate  Extremities: There is no clubbing or cyanosis  There is no edema  Symmetric  Neuro: Grossly nonfocal  Gait is normal      Lymphatic: No evidence of cervical adenopathy bilaterally  Skin: Warm, anicteric  Psych:  Patient is pleasant and talkative  Breasts:        Pathology:  [unfilled]    Labs:      Imaging  US head neck soft tissue    Result Date: 5/5/2023  Narrative: POSTERIOR NECK ULTRASOUND INDICATION: R22 0: Localized swelling, mass and lump, head  COMPARISON:  None TECHNIQUE:   Real-time ultrasound of the posterior neck in the region of palpable concern was performed with a linear transducer with both volumetric sweeps and still imaging techniques  FINDINGS: In the right occipital region area of palpable concern is 4 7 x 1 3 x 4 6 cm ovoid structure with ultrasonographic features similar to subcutaneous fat and absence of internal vascularity, findings most compatible with lipoma  Impression: Palpable abnormality appears to correspond to a 4 7 cm lipoma  Correlate with palpatory findings  Followup, if any, may be based on clinical grounds (i e , interval growth or discordance between suspected ultrasonographic diagnosis and clinical findings  If there is high index of clinical suspicion, correlation with either contrast-enhanced CT or MRI could be obtained for more definitive characterization  Workstation performed: [id number]     I reviewed the above laboratory and imaging data      Discussion/Summary: 25-year-old female with a history of a liver cyst  She now has a enlarging posterior neck mass  This is likely a lipoma  Since it is symptomatic I have recommended resection  I explained the risks of excision of her posterior neck mass to include bleeding, infection, recurrence, need for further surgery and wound complications  Informed consent was obtained  She was also instructed that she may require a drain  We will schedule this at our earliest mutual convenience  She is agreeable to this  All her questions were answered

## 2023-05-26 LAB
ATRIAL RATE: 63 BPM
P AXIS: 12 DEGREES
PR INTERVAL: 178 MS
QRS AXIS: 54 DEGREES
QRSD INTERVAL: 64 MS
QT INTERVAL: 416 MS
QTC INTERVAL: 425 MS
T WAVE AXIS: 22 DEGREES
VENTRICULAR RATE: 63 BPM

## 2023-06-06 ENCOUNTER — ANESTHESIA EVENT (OUTPATIENT)
Dept: PERIOP | Facility: HOSPITAL | Age: 73
End: 2023-06-06
Payer: MEDICARE

## 2023-06-06 NOTE — PRE-PROCEDURE INSTRUCTIONS
Pre-Surgery Instructions:   Medication Instructions   • levothyroxine (Euthyrox) 50 mcg tablet Take day of surgery  • lisinopril-hydrochlorothiazide (PRINZIDE,ZESTORETIC) 20-12 5 MG per tablet Hold day of surgery  • oxyCODONE (Roxicodone) 5 immediate release tablet Uses PRN- OK to take day of surgery   Medication instructions for day surgery reviewed  Please use only a sip of water to take your instructed medications  Avoid all over the counter vitamins, supplements and NSAIDS for one week prior to surgery per anesthesia guidelines  Tylenol is ok to take as needed  You will receive a call one business day prior to surgery with an arrival time and hospital directions  If your surgery is scheduled on a Monday, the hospital will be calling you on the Friday prior to your surgery  If you have not heard from anyone by 8pm, please call the hospital supervisor through the hospital  at [phone]  [facility] [phone])  Do not eat or drink anything after midnight the night before your surgery, including candy, mints, lifesavers, or chewing gum  Do not drink alcohol 24hrs before your surgery  Try not to smoke at least 24hrs before your surgery  Follow the pre surgery showering instructions as listed in the [facility] Surgical Experience Booklet” or otherwise provided by your surgeon's office  Do not shave the surgical area 24 hours before surgery  Do not apply any lotions, creams, including makeup, cologne, deodorant, or perfumes after showering on the day of your surgery  No contact lenses, eye make-up, or artificial eyelashes  Remove nail polish, including gel polish, and any artificial, gel, or acrylic nails if possible  Remove all jewelry including rings and body piercing jewelry  Wear causal clothing that is easy to take on and off  Consider your type of surgery  Keep any valuables, jewelry, piercings at home   Please bring any specially ordered equipment (sling, braces) if indicated  Arrange for a responsible person to drive you to and from the hospital on the day of your surgery  Visitor Guidelines discussed  Call the surgeon's office with any new illnesses, exposures, or additional questions prior to surgery  Please reference your [facility] Surgical Experience Booklet” for additional information to prepare for your upcoming surgery

## 2023-06-10 PROBLEM — Z13.83 SCREENING FOR CARDIOVASCULAR, RESPIRATORY, AND GENITOURINARY DISEASES: Status: RESOLVED | Noted: 2023-04-11 | Resolved: 2023-06-10

## 2023-06-10 PROBLEM — Z13.89 SCREENING FOR CARDIOVASCULAR, RESPIRATORY, AND GENITOURINARY DISEASES: Status: RESOLVED | Noted: 2023-04-11 | Resolved: 2023-06-10

## 2023-06-10 PROBLEM — Z13.6 SCREENING FOR CARDIOVASCULAR, RESPIRATORY, AND GENITOURINARY DISEASES: Status: RESOLVED | Noted: 2023-04-11 | Resolved: 2023-06-10

## 2023-06-14 ENCOUNTER — HOSPITAL ENCOUNTER (OUTPATIENT)
Facility: HOSPITAL | Age: 73
Setting detail: OUTPATIENT SURGERY
Discharge: HOME | End: 2023-06-14
Attending: SURGERY | Admitting: SURGERY
Payer: MEDICARE

## 2023-06-14 ENCOUNTER — ANESTHESIA (OUTPATIENT)
Dept: PERIOP | Facility: HOSPITAL | Age: 73
End: 2023-06-14
Payer: MEDICARE

## 2023-06-14 VITALS
TEMPERATURE: 97.8 F | RESPIRATION RATE: 20 BRPM | OXYGEN SATURATION: 97 % | HEART RATE: 67 BPM | SYSTOLIC BLOOD PRESSURE: 143 MMHG | DIASTOLIC BLOOD PRESSURE: 62 MMHG

## 2023-06-14 DIAGNOSIS — D17.0 LIPOMA OF NECK: ICD-10-CM

## 2023-06-14 DIAGNOSIS — R22.0 HEAD LUMP: Primary | ICD-10-CM

## 2023-06-14 PROCEDURE — 21552 EXC NECK LES SC 3 CM/>: CPT | Performed by: SURGERY

## 2023-06-14 PROCEDURE — 88304 TISSUE EXAM BY PATHOLOGIST: CPT | Performed by: PATHOLOGY

## 2023-06-14 RX ORDER — SODIUM CHLORIDE, SODIUM LACTATE, POTASSIUM CHLORIDE, CALCIUM CHLORIDE 600; 310; 30; 20 MG/100ML; MG/100ML; MG/100ML; MG/100ML
INJECTION, SOLUTION INTRAVENOUS CONTINUOUS PRN
Status: DISCONTINUED | OUTPATIENT
Start: 2023-06-14 | End: 2023-06-14

## 2023-06-14 RX ORDER — PROPOFOL 10 MG/ML
INJECTION, EMULSION INTRAVENOUS AS NEEDED
Status: DISCONTINUED | OUTPATIENT
Start: 2023-06-14 | End: 2023-06-14

## 2023-06-14 RX ORDER — ONDANSETRON 2 MG/ML
INJECTION INTRAMUSCULAR; INTRAVENOUS AS NEEDED
Status: DISCONTINUED | OUTPATIENT
Start: 2023-06-14 | End: 2023-06-14

## 2023-06-14 RX ORDER — ONDANSETRON 2 MG/ML
4 INJECTION INTRAMUSCULAR; INTRAVENOUS ONCE AS NEEDED
Status: DISCONTINUED | OUTPATIENT
Start: 2023-06-14 | End: 2023-06-14 | Stop reason: HOSPADM

## 2023-06-14 RX ORDER — BUPIVACAINE HYDROCHLORIDE AND EPINEPHRINE 5; 5 MG/ML; UG/ML
INJECTION, SOLUTION EPIDURAL; INTRACAUDAL; PERINEURAL AS NEEDED
Status: DISCONTINUED | OUTPATIENT
Start: 2023-06-14 | End: 2023-06-14 | Stop reason: HOSPADM

## 2023-06-14 RX ORDER — CEFAZOLIN SODIUM 1 G/50ML
1000 SOLUTION INTRAVENOUS ONCE
Status: COMPLETED | OUTPATIENT
Start: 2023-06-14 | End: 2023-06-14

## 2023-06-14 RX ORDER — FENTANYL CITRATE 50 UG/ML
INJECTION, SOLUTION INTRAMUSCULAR; INTRAVENOUS AS NEEDED
Status: DISCONTINUED | OUTPATIENT
Start: 2023-06-14 | End: 2023-06-14

## 2023-06-14 RX ORDER — ROCURONIUM BROMIDE 10 MG/ML
INJECTION, SOLUTION INTRAVENOUS AS NEEDED
Status: DISCONTINUED | OUTPATIENT
Start: 2023-06-14 | End: 2023-06-14

## 2023-06-14 RX ORDER — FENTANYL CITRATE/PF 50 MCG/ML
25 SYRINGE (ML) INJECTION
Status: COMPLETED | OUTPATIENT
Start: 2023-06-14 | End: 2023-06-14

## 2023-06-14 RX ORDER — LIDOCAINE HYDROCHLORIDE 20 MG/ML
INJECTION, SOLUTION EPIDURAL; INFILTRATION; INTRACAUDAL; PERINEURAL AS NEEDED
Status: DISCONTINUED | OUTPATIENT
Start: 2023-06-14 | End: 2023-06-14

## 2023-06-14 RX ORDER — MIDAZOLAM HYDROCHLORIDE 2 MG/2ML
INJECTION, SOLUTION INTRAMUSCULAR; INTRAVENOUS AS NEEDED
Status: DISCONTINUED | OUTPATIENT
Start: 2023-06-14 | End: 2023-06-14

## 2023-06-14 RX ADMIN — ROCURONIUM BROMIDE 30 MG: 10 INJECTION, SOLUTION INTRAVENOUS at 13:33

## 2023-06-14 RX ADMIN — CEFAZOLIN SODIUM 1000 MG: 1 SOLUTION INTRAVENOUS at 13:27

## 2023-06-14 RX ADMIN — ONDANSETRON 4 MG: 2 INJECTION INTRAMUSCULAR; INTRAVENOUS at 13:33

## 2023-06-14 RX ADMIN — FENTANYL CITRATE 25 MCG: 50 INJECTION INTRAMUSCULAR; INTRAVENOUS at 15:08

## 2023-06-14 RX ADMIN — FENTANYL CITRATE 25 MCG: 50 INJECTION INTRAMUSCULAR; INTRAVENOUS at 14:54

## 2023-06-14 RX ADMIN — FENTANYL CITRATE 25 MCG: 50 INJECTION INTRAMUSCULAR; INTRAVENOUS at 14:47

## 2023-06-14 RX ADMIN — SODIUM CHLORIDE, SODIUM LACTATE, POTASSIUM CHLORIDE, CALCIUM CHLORIDE: 600; 310; 30; 20 INJECTION, SOLUTION INTRAVENOUS at 14:30

## 2023-06-14 RX ADMIN — LIDOCAINE HYDROCHLORIDE 100 MG: 20 INJECTION, SOLUTION EPIDURAL; INFILTRATION; INTRACAUDAL; PERINEURAL at 13:33

## 2023-06-14 RX ADMIN — FENTANYL CITRATE 25 MCG: 50 INJECTION INTRAMUSCULAR; INTRAVENOUS at 15:03

## 2023-06-14 RX ADMIN — SODIUM CHLORIDE, SODIUM LACTATE, POTASSIUM CHLORIDE, CALCIUM CHLORIDE: 600; 310; 30; 20 INJECTION, SOLUTION INTRAVENOUS at 13:22

## 2023-06-14 RX ADMIN — PROPOFOL 150 MG: 10 INJECTION, EMULSION INTRAVENOUS at 13:33

## 2023-06-14 RX ADMIN — MIDAZOLAM HYDROCHLORIDE 2 MG: 2 INJECTION, SOLUTION INTRAMUSCULAR; INTRAVENOUS at 13:27

## 2023-06-14 RX ADMIN — FENTANYL CITRATE 50 MCG: 50 INJECTION, SOLUTION INTRAMUSCULAR; INTRAVENOUS at 13:33

## 2023-06-14 NOTE — OP NOTE
OPERATIVE REPORT  PATIENT NAME: Erick Chan    :  1950  MRN: [id number]  Pt Location: AN OR ROOM 04    SURGERY DATE: 2023    Surgeon(s) and Role:     * Michelle Rose MD - Primary     * Anabelle Erazo MD - Assisting    Preop Diagnosis:  Lipoma of neck [D17 0]    Post-Op Diagnosis Codes:     * Lipoma of neck [D17 0]    Procedure(s):  EXCISION OF POSTERIOR NECK MASS    Specimen(s):  ID Type Source Tests Collected by Time Destination   1 : POSTERIOR NECK MASS Tissue Neck TISSUE EXAM Michelle Rose MD 2023  1:58 PM        Estimated Blood Loss:   Minimal    Drains:  Closed/Suction Drain Posterior;Right Other (Comment) Bulb 10 Fr  (Active)   Number of days: 0       Anesthesia Type:   General    Operative Indications:  Lipoma of neck [D160]  70-year-old female with a posterior neck mass consistent with a lipoma  She desired excision  Risks and benefits were explained  Informed consent was obtained  Patient was brought to the operating room  Operative Findings:  Lesion measured 4 5 x 5 cm  1 mm margins were taken circumferentially  Complications:   None    Procedure and Technique:  After identifying the patient, the lesion was marked  Patient was then brought to the operating room and identified  General anesthesia was achieved  Patient was then placed in the prone position  The surgical site was shaved and she was prepped and draped in the usual sterile fashion  A timeout was performed  A transverse incision was made overlying this mass  Using sharp dissection this was taken through skin, subcutaneous tissue  Flaps were now raised superiorly, medially, inferiorly and laterally and the entire mass was excised in total off of the underlying muscle  Specimen was handed off the table  Wound was copiously irrigated  There was excellent hemostasis  A 10 [place]ian round MORENITA was now placed through a separate stab incision and secured to the skin using 3-0 nylon suture    The incision was approximated with interrupted 2-0 Vicryl suture subcutaneously and a running 4-0 Monocryl suture in a subcuticular fashion  Skin glue was used on the skin  Patient was then placed in the supine position and extubated and taken to the recovery room in stable condition having tolerated the procedure well  I was present and participated in all aspects of this procedure  I was present for the entire procedure      Patient Disposition:  PACU         SIGNATURE: Chioma Stauffer MD  DATE: June 14, 2023  TIME: 2:18 PM

## 2023-06-14 NOTE — CASE MANAGEMENT
Case Management Discharge Planning Note    Patient name Rodolfo Blanco  Location [address] MRN [id number]  : 1950 Date 2023       Current Admission Date: 2023  Current Admission Diagnosis:Lipoma of neck   Patient Active Problem List    Diagnosis Date Noted   • Lipoma of neck 2023   • Head lump 2023   • [place] cardiac risk >20% in next 10 years 2023   • CKD (chronic kidney disease) stage 2, GFR 60-89 ml/min 2023   • Postmenopausal state 02/10/2023   • Breast cancer screening by mammogram 2023   • Bone island 2022   • Left foot pain 2022   • Hyperbilirubinemia 2022   • Simple hepatic cyst 2020   • Medicare annual wellness visit, subsequent 2019   • Dysfunction of left eustachian tube 2018   • Obesity (BMI 30-39 9) 2018   • Screening breast examination 2018   • Prediabetes 2017   • Other specified hypothyroidism 2016   • Essential hypertension 2016   • Cardiac murmur 2016      LOS (days): 0  Geometric Mean LOS (GMLOS) (days):   Days to GMLOS:     OBJECTIVE:            Current admission status: Outpatient Surgery   Preferred Pharmacy:   [facility] [address] MARCIA LING [facility], [address] [address]  Phone: [phone] Fax: [phone]    Primary Care Provider: Sue Hannon DO    Primary Insurance: MEDICARE  Secondary Insurance: [address] [place] [address] Street:    [address] [place] [address]         Is the patient interested in [address] at discharge?: Yes  [address] requested[de-identified] Nursing, Physical Therapy, Occupational [address] Name[de-identified] [facility] External Referral Reason (only applicable if external HHA name selected): Services not provided in network or near patient location  [address] [place] [address] Provider[de-identified] PCP  Home Health Services Needed[de-identified] Gait/ADL Training, Evaluate Functional Status and Safety, Post-Op Care and Assessment, Wound/Ostomy Care, Strengthening/Theraputic Exercises to Improve Function, Other (comment) (MORENITA Drain management)  Homebound Criteria Met[de-identified] Requires the Assistance of Another Person for Safe Ambulation or to Leave the Home, Uses an Assist Device (i e  cane, walker, etc)  Supporting Clincal Findings[de-identified] Fatigues Easliy in [employer], Limited Endurance    DME Referral Provided  Referral made for DME?: No    Other Referral/Resources/Interventions Provided:  Interventions: [facility]  Referral Comments:   [place] referrals opened to [facility] per request of surgery team  7 agencies referred to, only 1 accepting agency  CM called Nan @ [facility] to confirm able to accept for services @ D/C       Treatment Team Recommendation: Home with [address] [facility]  Discharge Destination Plan[de-identified] Home with [address] [facility] (Home with [facility])  Transport at Discharge : Family

## 2023-06-14 NOTE — DISCHARGE INSTR - AVS FIRST PAGE
POST-OPERATIVE WOUND CARE INSTRUCTIONS    Your wound is closed with:   Dissolvable sutures/glue       Wound care: You may remove your dressing after 24 hours   You may shower using soap and water to clean your wound  Gently pat it dry  You may redress your wound for comfort as needed  Activity:   Did not perform any heavy lifting or strenuous physical activity for 7 days  Your activity restrictions will be reevaluated at your postoperative visit  Medications: You may resume all your preoperative medications and diet  Pain medication as directed on the prescription given in the office  Other:   May use ice on the wound for 24-48 hours as needed for comfort  May place warm compresses to the neck after 48 hours for comfort  Record MORENITA drainage output twice a day  Call the office at [phone] if you have any of the followin  Redness, swelling, heat, unusual drainage or heavy bleeding from the wound     2  Fever greater than 101°F    3  Pain not relieved by the prescribed pain medication

## 2023-06-14 NOTE — ANESTHESIA POSTPROCEDURE EVALUATION
Post-Op Assessment Note    CV Status:  Stable  Pain Score: 0    Pain management: adequate     Mental Status:  Awake and alert   Hydration Status:  Stable   PONV Controlled:  None   Airway Patency:  Patent and adequate      Post Op Vitals Reviewed: Yes      Staff: CRNA, Anesthesiologist         No notable events documented      BP   184/75   Temp  97 1   Pulse  74   Resp   16   SpO2   99%

## 2023-06-15 ENCOUNTER — TELEPHONE (OUTPATIENT)
Dept: HEMATOLOGY ONCOLOGY | Facility: CLINIC | Age: 73
End: 2023-06-15

## 2023-06-15 NOTE — TELEPHONE ENCOUNTER
Patient Call    Who are you speaking with? Alessandra, [facility] nurses association     If it is not the patient, are they listed on an active communication consent form? N/A   What is the reason for this call? Alessandra from visiting nurses’ association is calling to confirm if Dr Robert Abdul will be following the patient’s homecare  Alessandra would also like to mention that the patient was discharged  Does this require a call back? Yes   If a call back is required, please list best call back number [phone]   If a call back is required, advise that a message will be forwarded to their care team and someone will return their call as soon as possible  Did you relay this information to the patient?  Yes

## 2023-06-15 NOTE — TELEPHONE ENCOUNTER
Spoke to V[facility], informed them that Dr Jorje Ortega will sing off on the orders  Also asked the visiting nurse to give me a call when she goes to assess the MORENITA drain

## 2023-06-15 NOTE — TELEPHONE ENCOUNTER
Patient Call    Who are you speaking with? Patient    If it is not the patient, are they listed on an active communication consent form? N/A   What is the reason for this call? Pt called to have her drain taken out   Does this require a call back? Yes   If a call back is required, please list [facility] call back number [phone]   If a call back is required, advise that a message will be forwarded to their care team and someone will return their call as soon as possible  Did you relay this information to the patient?  Yes

## 2023-06-15 NOTE — TELEPHONE ENCOUNTER
Spoke to patient and informed her it is too soon for the drain to be taken out, her surgery was yesterday  She will have [facility]NA come to the house for a drain teach and I will follow up with her next week

## 2023-06-16 ENCOUNTER — TELEPHONE (OUTPATIENT)
Dept: HEMATOLOGY ONCOLOGY | Facility: CLINIC | Age: 73
End: 2023-06-16

## 2023-06-16 NOTE — TELEPHONE ENCOUNTER
Call Transfer   Who are you speaking with? self   If it is not the patient, are they listed on an active communication consent form? self   Who is the patients HemOnc/SurgOnc provider? William Moon   What is the reason for this call? Status of visiting nurse request   Person/Department that the call was transferred to? Time that call was transferred? Kelli Eisenmenger 9:54am 6/16   Your call will be transferred now  If you receive a voicemail, please leave a detailed message and a member of the team will return your call as soon as possible  Did you relay this information to the caller?  yes

## 2023-06-16 NOTE — TELEPHONE ENCOUNTER
Spoke to patient and informed her VNA will take 24-48 hours to reach out  If she doesn't hear anything by Monday she can give me a call

## 2023-06-18 ENCOUNTER — TELEPHONE (OUTPATIENT)
Dept: OTHER | Facility: OTHER | Age: 73
End: 2023-06-18

## 2023-06-18 NOTE — TELEPHONE ENCOUNTER
Skin is irritated and has not had any drainage, wants pt to get seen sooner than later, for possible infection, pt does not need physical therapy just nursing and occupational therapy     Please give pt a call to reschedule for a sooner date

## 2023-06-19 ENCOUNTER — TELEPHONE (OUTPATIENT)
Dept: SURGICAL ONCOLOGY | Facility: CLINIC | Age: 73
End: 2023-06-19

## 2023-06-19 PROCEDURE — 88304 TISSUE EXAM BY PATHOLOGIST: CPT | Performed by: PATHOLOGY

## 2023-06-19 NOTE — TELEPHONE ENCOUNTER
Spoke to patient and reviewed her benign pathology results  Patient also said there is not much coming from the drain  Informed her I would like to keep it in for 1 week  She can call the office on Wednesday and either me or Felicity Muñoz will take it out

## 2023-06-20 ENCOUNTER — TELEPHONE (OUTPATIENT)
Dept: HEMATOLOGY ONCOLOGY | Facility: CLINIC | Age: 73
End: 2023-06-20

## 2023-06-20 ENCOUNTER — TELEPHONE (OUTPATIENT)
Dept: OTHER | Facility: OTHER | Age: 73
End: 2023-06-20

## 2023-06-20 ENCOUNTER — OFFICE VISIT (OUTPATIENT)
Dept: SURGICAL ONCOLOGY | Facility: CLINIC | Age: 73
End: 2023-06-20

## 2023-06-20 VITALS
DIASTOLIC BLOOD PRESSURE: 76 MMHG | HEART RATE: 86 BPM | HEIGHT: 61 IN | WEIGHT: 172 LBS | OXYGEN SATURATION: 99 % | TEMPERATURE: 97.4 F | BODY MASS INDEX: 32.47 KG/M2 | SYSTOLIC BLOOD PRESSURE: 152 MMHG

## 2023-06-20 DIAGNOSIS — Z48.89 ENCOUNTER FOR POSTOPERATIVE WOUND CARE: ICD-10-CM

## 2023-06-20 DIAGNOSIS — D17.0 LIPOMA OF NECK: Primary | ICD-10-CM

## 2023-06-20 PROCEDURE — 99024 POSTOP FOLLOW-UP VISIT: CPT

## 2023-06-20 NOTE — PROGRESS NOTES
Surgical Oncology Follow Up       [facility] [address] [id number] [phone]    Skip Matthews  1950  [id number]  [facility] [place]  [id number] [place] [address]  [phone]    Diagnoses and all orders for this visit:    Lipoma of neck    Encounter for postoperative wound care    Other orders  -     Cancel: DXA bone density spine hip and pelvis        Chief Complaint   Patient presents with   • Post-op       No follow-ups on file  History of Present Illness: The patient presents to the office today for drain care following excision of a posterior neck lipoma 6 days ago  She reports the drain is not collecting any fluid, and has not collected anything since the day after surgery  She does report that there seems to be blood oozing from the drain site today, but that this had not happened previously  She denies any fevers or chills  Review of Systems   Constitutional: Negative for activity change, appetite change, chills and fever  HENT: Negative  Respiratory: Negative  Cardiovascular: Negative  Gastrointestinal: Negative  Musculoskeletal: Negative  Skin: Positive for wound  Negative for color change and rash  Neurological: Negative  Hematological: Negative  Negative for adenopathy  Psychiatric/Behavioral: Negative  Patient Active Problem List   Diagnosis   • Other specified hypothyroidism   • Essential hypertension   • Cardiac murmur   • Prediabetes   • Obesity (BMI 30-39  9)   • Screening breast examination   • Dysfunction of left eustachian tube   • Medicare annual wellness visit, subsequent   • Simple hepatic cyst   • Hyperbilirubinemia   • Left foot pain   • Bone island   • Breast cancer screening by mammogram   • Postmenopausal state   • CKD (chronic kidney disease) stage 2, GFR 60-89 ml/min   • [place] cardiac risk >20% in next 10 years   • Head lump   • Lipoma of neck     Past Medical History:   Diagnosis Date   • Disease of thyroid gland    • Hypertension    • Myalgia 8/3/2017   • Right shoulder pain 5/27/2017   • Viral upper respiratory tract infection 3/6/2018   • Viral URI with cough 3/6/2018     Past Surgical History:   Procedure Laterality Date   • FACIAL/NECK BIOPSY N/A 6/14/2023    Procedure: EXCISION OF POSTERIOR NECK MASS;  Surgeon: Rodrigo Romano MD;  [facility]: [facility];  Service: Surgical Oncology   • HYSTERECTOMY     • OOPHORECTOMY       Family History   Problem Relation Age of Onset   • No Known Problems Mother    • No Known Problems Father    • Thyroid disease Sister    • Thyroid disease Brother    • Thyroid disease Sister    • Thyroid disease Sister    • Thyroid disease Brother    • Thyroid disease Brother      Social History     Socioeconomic History   • Marital status: /Civil Union     Spouse name: Not on file   • Number of children: Not on file   • Years of education: Not on file   • Highest education level: Not on file   Occupational History   • Not on file   Tobacco Use   • Smoking status: Never   • Smokeless tobacco: Never   Vaping Use   • Vaping Use: Never used   Substance and Sexual Activity   • Alcohol use: Never   • Drug use: Never   • Sexual activity: Not Currently   Other Topics Concern   • Not on file   Social History Narrative   • Not on file     Social Determinants of Health     Financial Resource Strain: Low Risk  (2/10/2023)    Overall Financial Resource Strain (CARDIA)    • Difficulty of Paying Living Expenses: Not hard at all   Food Insecurity: Not on file   Transportation Needs: No Transportation Needs (2/10/2023)    PRAPARE - Transportation    • Lack of Transportation (Medical): No    • Lack of Transportation (Non-Medical):  No   Physical Activity: Not on file   Stress: Not on file   Social Connections: Not on file   Intimate Partner Violence: Not on file   Housing Stability: Not on file       Current Outpatient Medications:   •  levothyroxine (Euthyrox) 50 mcg tablet, Take 1 tablet (50 mcg total) by mouth daily, Disp: 90 tablet, Rfl: 1  •  lisinopril-hydrochlorothiazide (PRINZIDE,ZESTORETIC) 20-12 5 MG per tablet, Take 1 tablet by mouth daily, Disp: 90 tablet, Rfl: 1  •  oxyCODONE (Roxicodone) 5 immediate release tablet, Take 1 tablet (5 mg total) by mouth every 6 (six) hours as needed for moderate pain Max Daily Amount: 20 mg (Patient not taking: Reported on 6/20/2023), Disp: 10 tablet, Rfl: 0  No Known Allergies  Vitals:    06/20/23 1255   BP: 152/76   Pulse: 86   Temp: (!) 97 4 °F (36 3 °C)   SpO2: 99%       Physical Exam  Vitals reviewed  Constitutional:       General: She is not in acute distress  Appearance: Normal appearance  She is not ill-appearing or toxic-appearing  HENT:      Head: Normocephalic and atraumatic  Nose: Nose normal       Mouth/Throat:      Mouth: Mucous membranes are moist    Eyes:      General: No scleral icterus  Extraocular Movements: Extraocular movements intact  Conjunctiva/sclera: Conjunctivae normal       Pupils: Pupils are equal, round, and reactive to light  Neck:        Comments: Healing posterior neck/scalp incision  There is no erythema or swelling present  Cardiovascular:      Rate and Rhythm: Normal rate  Pulmonary:      Effort: Pulmonary effort is normal    Musculoskeletal:         General: Normal range of motion  Cervical back: Normal range of motion  No tenderness  Lymphadenopathy:      Cervical: No cervical adenopathy  Skin:     General: Skin is warm and dry  Findings: No erythema  Neurological:      General: No focal deficit present  Mental Status: She is alert and oriented to person, place, and time  Psychiatric:         Mood and Affect: Mood normal          Behavior: Behavior normal          Thought Content:  Thought content normal          Judgment: Judgment normal            Discussion/Summary: This is a 67-year-old female who presents to the office today for postoperative drain care  Since her drain has not been collecting any fluid, this was removed in the office  The patient tolerated this well  There appeared to be an occlusion in the drain tubing  I did massage the back of her neck to express any fluid that might be sitting in the surgical bed  A small amount of serosanguineous fluid was expressed  A dressing was placed, and the patient was instructed on care at home  She will return in 1 week for follow-up with Dr Michael Antunez  She is agreeable to the plan, all questions were answered today

## 2023-06-20 NOTE — TELEPHONE ENCOUNTER
Call Transfer   Who are you speaking with? Patient   If it is not the patient, are they listed on an active communication consent form? N/A   Who is the patients HemOnc/SurgOnc provider? Dr Shazia Sheehan   What is the reason for this call? Pt wanted to speak with Aden Cureursula   Person/Department that the call was transferred to? Time that call was transferred? Mariola Goldberg   Your call will be transferred now  If you receive a voicemail, please leave a detailed message and a member of the team will return your call as soon as possible  Did you relay this information to the caller?   Yes

## 2023-06-20 NOTE — TELEPHONE ENCOUNTER
Call Transfer   Who are you speaking with?  visiting nurse   If it is not the patient, are they listed on an active communication consent form? N/A   Who is the patients HemOnc/SurgOnc provider? Dr Ubaldo Nieves   What is the reason for this call? Burt Gillespie called to give status on patient   Person/Department that the call was transferred to? Time that call was transferred? Luis Armando Brooks   Your call will be transferred now  If you receive a voicemail, please leave a detailed message and a member of the team will return your call as soon as possible  Did you relay this information to the caller?   Yes

## 2023-06-20 NOTE — TELEPHONE ENCOUNTER
Patient called would like to schedule an appointment today to have her drain removed  Please call patient when office opens

## 2023-06-28 ENCOUNTER — OFFICE VISIT (OUTPATIENT)
Dept: SURGICAL ONCOLOGY | Facility: CLINIC | Age: 73
End: 2023-06-28

## 2023-06-28 VITALS
TEMPERATURE: 98 F | SYSTOLIC BLOOD PRESSURE: 150 MMHG | WEIGHT: 177.5 LBS | HEIGHT: 61 IN | BODY MASS INDEX: 33.51 KG/M2 | DIASTOLIC BLOOD PRESSURE: 88 MMHG | HEART RATE: 50 BPM | OXYGEN SATURATION: 98 %

## 2023-06-28 DIAGNOSIS — D17.0 LIPOMA OF NECK: Primary | ICD-10-CM

## 2023-06-28 PROCEDURE — 99024 POSTOP FOLLOW-UP VISIT: CPT | Performed by: SURGERY

## 2023-06-28 NOTE — PROGRESS NOTES
Surgical Oncology Follow Up       [address]  [facility] [place]  [address] [facility] [address]    Adrianna Numbers  1950  [id number]  [address] [facility]  [facility] [place]  [address] [facility] [address] [facility] [address]    Diagnoses and all orders for this visit:    Lipoma of neck        Chief Complaint   Patient presents with   • Post-op       No follow-ups on file  Oncology History   Lipoma of neck   5/23/2023 Initial Diagnosis    Lipoma of neck     6/14/2023 Surgery    Excision posterior neck mass   A  Soft tissue, posterior neck mass, excision:  -Benign lobulated adipose tissue compatible with benign lipoma  History of Present Illness: Patient returns in follow-up of her posterior neck mass excision  This was consistent with a benign lipoma  She denies any fevers or chills  Her drain was removed a week ago  Review of Systems  Complete ROS Surg Onc:   Complete ROS Surg Onc:   Constitutional: The patient denies new or recent history of general fatigue, no recent weight loss, no change in appetite  Eyes: No complaints of visual problems, no scleral icterus  ENT: no complaints of ear pain, no hoarseness, no difficulty swallowing,  no tinnitus and no new masses in head, oral cavity, or neck  Cardiovascular: No complaints of chest pain, no palpitations, no ankle edema  Respiratory: No complaints of shortness of breath, no cough  Gastrointestinal: No complaints of jaundice, no bloody stools, no pale stools  Genitourinary: No complaints of dysuria, no hematuria, no nocturia, no frequent urination, no urethral discharge  Musculoskeletal: No complaints of weakness, paralysis, joint stiffness or arthralgias  Integumentary: No complaints of rash, no new lesions  Neurological: No complaints of convulsions, no seizures, no dizziness     Hematologic/Lymphatic: No complaints of easy bruising  Endocrine:  No hot or cold intolerance  No polydipsia, polyphagia, or polyuria  Allergy/immunology:  No environmental allergies  No food allergies  Not immunocompromised  Skin:  No pallor or rash  No wound  Patient Active Problem List   Diagnosis   • Other specified hypothyroidism   • Essential hypertension   • Cardiac murmur   • Prediabetes   • Obesity (BMI 30-39  9)   • Screening breast examination   • Dysfunction of left eustachian tube   • Medicare annual wellness visit, subsequent   • Simple hepatic cyst   • Hyperbilirubinemia   • Left foot pain   • Bone island   • Breast cancer screening by mammogram   • Postmenopausal state   • CKD (chronic kidney disease) stage 2, GFR 60-89 ml/min   • [place] cardiac risk >20% in next 10 years   • Head lump   • Lipoma of neck     Past Medical History:   Diagnosis Date   • Disease of thyroid gland    • Hypertension    • Myalgia 8/3/2017   • Right shoulder pain 5/27/2017   • Viral upper respiratory tract infection 3/6/2018   • Viral URI with cough 3/6/2018     Past Surgical History:   Procedure Laterality Date   • FACIAL/NECK BIOPSY N/A 6/14/2023    Procedure: EXCISION OF POSTERIOR NECK MASS;  Surgeon: Penelope Allred MD;  [facility];  Service: Surgical Oncology   • HYSTERECTOMY     • OOPHORECTOMY       Family History   Problem Relation Age of Onset   • No Known Problems Mother    • No Known Problems Father    • Thyroid disease Sister    • Thyroid disease Brother    • Thyroid disease Sister    • Thyroid disease Sister    • Thyroid disease Brother    • Thyroid disease Brother      Social History     Socioeconomic History   • Marital status: /Civil Union     Spouse name: Not on file   • Number of children: Not on file   • Years of education: Not on file   • Highest education level: Not on file   Occupational History   • Not on file   Tobacco Use   • Smoking status: Never   • Smokeless tobacco: Never   Vaping Use   • Vaping Use: Never used   Substance and Sexual Activity   • Alcohol use: Never   • Drug use: Never   • Sexual activity: Not Currently   Other Topics Concern   • Not on file   Social History Narrative   • Not on file     Social Determinants of Health     Financial Resource Strain: Low Risk  (2/10/2023)    Overall Financial Resource Strain (CARDIA)    • Difficulty of Paying Living Expenses: Not hard at all   Food Insecurity: Not on file   Transportation Needs: No Transportation Needs (2/10/2023)    PRAPARE - Transportation    • Lack of Transportation (Medical): No    • Lack of Transportation (Non-Medical): No   Physical Activity: Not on file   Stress: Not on file   Social Connections: Not on file   Intimate Partner Violence: Not on file   Housing Stability: Not on file       Current Outpatient Medications:   •  levothyroxine (Euthyrox) 50 mcg tablet, Take 1 tablet (50 mcg total) by mouth daily, Disp: 90 tablet, Rfl: 1  •  lisinopril-hydrochlorothiazide (PRINZIDE,ZESTORETIC) 20-12 5 MG per tablet, Take 1 tablet by mouth daily, Disp: 90 tablet, Rfl: 1  •  oxyCODONE (Roxicodone) 5 immediate release tablet, Take 1 tablet (5 mg total) by mouth every 6 (six) hours as needed for moderate pain Max Daily Amount: 20 mg (Patient not taking: Reported on 6/20/2023), Disp: 10 tablet, Rfl: 0  No Known Allergies  Vitals:    06/28/23 1045   BP: 150/88   Pulse: (!) 50   Temp: 98 °F (36 7 °C)   SpO2: 98%       Physical Exam  Constitutional: General appearance: The Patient is well-developed and well-nourished who appears the stated age in no acute distress  Patient is pleasant and talkative  HEENT:  Normocephalic  Sclerae are anicteric  Mucous membranes are moist    Extremities: There is no clubbing or cyanosis  There is no edema  Symmetric  Neuro: Grossly nonfocal  Gait is normal      Skin: Warm, anicteric  Incision is C/D/I  No evidence of seroma  Psych:  Patient is pleasant and talkative  Breasts:        Pathology:  Final Diagnosis   A  Soft tissue, posterior neck mass, excision:  -Benign lobulated adipose tissue compatible with benign lipoma  Electronically signed by Abeba Spencer MD on 6/19/2023 at 10:36 AM         Labs:      Imaging  No results found  I reviewed the above laboratory and imaging data  Discussion/Summary: 62-year-old female status post excision of a posterior neck lipoma  She is doing well  The pathology was benign  I will see her again in the future should the need arise  All of her questions were answered

## 2023-06-28 NOTE — LETTER
June 28, 2023     Marco Taylor, DO  [address] [address]    Patient: Driss Ask   YOB: 1950   Date of Visit: 6/28/2023       Dear Dr Delatorre Palisade: Thank you for referring Driss Ask to me for evaluation  Below are my notes for this consultation  If you have questions, please do not hesitate to call me  I look forward to following your patient along with you  Sincerely,        Houston Nunez MD        CC: No Recipients    Houston Nunez MD  6/28/2023 11:05 AM  Sign when Signing Visit               Surgical Oncology Follow Up       [address] [address]    Driss Ask  1950  [id number]  [address]  [facility] [address] [address]    Diagnoses and all orders for this visit:    Lipoma of neck        Chief Complaint   Patient presents with   • Post-op       No follow-ups on file  Oncology History   Lipoma of neck   5/23/2023 Initial Diagnosis    Lipoma of neck     6/14/2023 Surgery    Excision posterior neck mass   A  Soft tissue, posterior neck mass, excision:  -Benign lobulated adipose tissue compatible with benign lipoma  History of Present Illness: Patient returns in follow-up of her posterior neck mass excision  This was consistent with a benign lipoma  She denies any fevers or chills  Her drain was removed a week ago  Review of Systems  Complete ROS Surg Onc:   Complete ROS Surg Onc:   Constitutional: The patient denies new or recent history of general fatigue, no recent weight loss, no change in appetite  Eyes: No complaints of visual problems, no scleral icterus  ENT: no complaints of ear pain, no hoarseness, no difficulty swallowing,  no tinnitus and no new masses in head, oral cavity, or neck     Cardiovascular: No complaints of chest pain, no palpitations, no ankle edema  Respiratory: No complaints of shortness of breath, no cough  Gastrointestinal: No complaints of jaundice, no bloody stools, no pale stools  Genitourinary: No complaints of dysuria, no hematuria, no nocturia, no frequent urination, no urethral discharge  Musculoskeletal: No complaints of weakness, paralysis, joint stiffness or arthralgias  Integumentary: No complaints of rash, no new lesions  Neurological: No complaints of convulsions, no seizures, no dizziness  Hematologic/Lymphatic: No complaints of easy bruising  Endocrine:  No hot or cold intolerance  No polydipsia, polyphagia, or polyuria  Allergy/immunology:  No environmental allergies  No food allergies  Not immunocompromised  Skin:  No pallor or rash  No wound  Patient Active Problem List   Diagnosis   • Other specified hypothyroidism   • Essential hypertension   • Cardiac murmur   • Prediabetes   • Obesity (BMI 30-39  9)   • Screening breast examination   • Dysfunction of left eustachian tube   • Medicare annual wellness visit, subsequent   • Simple hepatic cyst   • Hyperbilirubinemia   • Left foot pain   • Bone island   • Breast cancer screening by mammogram   • Postmenopausal state   • CKD (chronic kidney disease) stage 2, GFR 60-89 ml/min   • [place] cardiac risk >20% in next 10 years   • Head lump   • Lipoma of neck     Past Medical History:   Diagnosis Date   • Disease of thyroid gland    • Hypertension    • Myalgia 8/3/2017   • Right shoulder pain 5/27/2017   • Viral upper respiratory tract infection 3/6/2018   • Viral URI with cough 3/6/2018     Past Surgical History:   Procedure Laterality Date   • FACIAL/NECK BIOPSY N/A 6/14/2023    Procedure: EXCISION OF POSTERIOR NECK MASS;  Surgeon: Marce Sidhu MD;  [facility];  Service: Surgical Oncology   • HYSTERECTOMY     • OOPHORECTOMY       Family History   Problem Relation Age of Onset   • No Known Problems Mother    • No Known Problems Father    • Thyroid disease Sister    • Thyroid disease Brother    • Thyroid disease Sister    • Thyroid disease Sister    • Thyroid disease Brother    • Thyroid disease Brother      Social History     Socioeconomic History   • Marital status: /Civil Union     Spouse name: Not on file   • Number of children: Not on file   • Years of education: Not on file   • Highest education level: Not on file   Occupational History   • Not on file   Tobacco Use   • Smoking status: Never   • Smokeless tobacco: Never   Vaping Use   • Vaping Use: Never used   Substance and Sexual Activity   • Alcohol use: Never   • Drug use: Never   • Sexual activity: Not Currently   Other Topics Concern   • Not on file   Social History Narrative   • Not on file     Social Determinants of Health     Financial Resource Strain: Low Risk  (2/10/2023)    Overall Financial Resource Strain (CARDIA)    • Difficulty of Paying Living Expenses: Not hard at all   Food Insecurity: Not on file   Transportation Needs: No Transportation Needs (2/10/2023)    PRAPARE - Transportation    • Lack of Transportation (Medical): No    • Lack of Transportation (Non-Medical):  No   Physical Activity: Not on file   Stress: Not on file   Social Connections: Not on file   Intimate Partner Violence: Not on file   Housing Stability: Not on file       Current Outpatient Medications:   •  levothyroxine (Euthyrox) 50 mcg tablet, Take 1 tablet (50 mcg total) by mouth daily, Disp: 90 tablet, Rfl: 1  •  lisinopril-hydrochlorothiazide (PRINZIDE,ZESTORETIC) 20-12 5 MG per tablet, Take 1 tablet by mouth daily, Disp: 90 tablet, Rfl: 1  •  oxyCODONE (Roxicodone) 5 immediate release tablet, Take 1 tablet (5 mg total) by mouth every 6 (six) hours as needed for moderate pain Max Daily Amount: 20 mg (Patient not taking: Reported on 6/20/2023), Disp: 10 tablet, Rfl: 0  No Known Allergies  Vitals:    06/28/23 1045   BP: 150/88   Pulse: (!) 50   Temp: 98 °F (36 7 °C)   SpO2: 98% Physical Exam  Constitutional: General appearance: The Patient is well-developed and well-nourished who appears the stated age in no acute distress  Patient is pleasant and talkative  HEENT:  Normocephalic  Sclerae are anicteric  Mucous membranes are moist    Extremities: There is no clubbing or cyanosis  There is no edema  Symmetric  Neuro: Grossly nonfocal  Gait is normal      Skin: Warm, anicteric  Incision is C/D/I  No evidence of seroma  Psych:  Patient is pleasant and talkative  Breasts:        Pathology:  Final Diagnosis   A  Soft tissue, posterior neck mass, excision:  -Benign lobulated adipose tissue compatible with benign lipoma  Electronically signed by Veronica Giraldo MD on 6/19/2023 at 10:36 AM         Labs:      Imaging  No results found  I reviewed the above laboratory and imaging data  Discussion/Summary: 42-year-old female status post excision of a posterior neck lipoma  She is doing well  The pathology was benign  I will see her again in the future should the need arise  All of her questions were answered

## 2023-10-03 ENCOUNTER — APPOINTMENT (OUTPATIENT)
Dept: LAB | Facility: CLINIC | Age: 73
End: 2023-10-03
Payer: MEDICARE

## 2023-10-03 ENCOUNTER — RA CDI HCC (OUTPATIENT)
Dept: OTHER | Facility: HOSPITAL | Age: 73
End: 2023-10-03

## 2023-10-03 DIAGNOSIS — E03.8 OTHER SPECIFIED HYPOTHYROIDISM: ICD-10-CM

## 2023-10-03 DIAGNOSIS — Z13.6 SCREENING FOR CARDIOVASCULAR, RESPIRATORY, AND GENITOURINARY DISEASES: ICD-10-CM

## 2023-10-03 DIAGNOSIS — Z13.83 SCREENING FOR CARDIOVASCULAR, RESPIRATORY, AND GENITOURINARY DISEASES: ICD-10-CM

## 2023-10-03 DIAGNOSIS — Z13.89 SCREENING FOR CARDIOVASCULAR, RESPIRATORY, AND GENITOURINARY DISEASES: ICD-10-CM

## 2023-10-03 DIAGNOSIS — R73.03 PREDIABETES: ICD-10-CM

## 2023-10-03 LAB
ALBUMIN SERPL BCP-MCNC: 4.5 G/DL (ref 3.5–5)
ALP SERPL-CCNC: 79 U/L (ref 34–104)
ALT SERPL W P-5'-P-CCNC: 21 U/L (ref 7–52)
ANION GAP SERPL CALCULATED.3IONS-SCNC: 9 MMOL/L
AST SERPL W P-5'-P-CCNC: 27 U/L (ref 13–39)
BILIRUB SERPL-MCNC: 2.51 MG/DL (ref 0.2–1)
BUN SERPL-MCNC: 12 MG/DL (ref 5–25)
CALCIUM SERPL-MCNC: 9.5 MG/DL (ref 8.4–10.2)
CHLORIDE SERPL-SCNC: 103 MMOL/L (ref 96–108)
CHOLEST SERPL-MCNC: 180 MG/DL
CO2 SERPL-SCNC: 28 MMOL/L (ref 21–32)
CREAT SERPL-MCNC: 0.67 MG/DL (ref 0.6–1.3)
GFR SERPL CREATININE-BSD FRML MDRD: 86 ML/MIN/1.73SQ M
GLUCOSE P FAST SERPL-MCNC: 108 MG/DL (ref 65–99)
HDLC SERPL-MCNC: 57 MG/DL
LDLC SERPL CALC-MCNC: 97 MG/DL (ref 0–100)
POTASSIUM SERPL-SCNC: 4.6 MMOL/L (ref 3.5–5.3)
PROT SERPL-MCNC: 7.3 G/DL (ref 6.4–8.4)
SODIUM SERPL-SCNC: 140 MMOL/L (ref 135–147)
TRIGL SERPL-MCNC: 131 MG/DL
TSH SERPL DL<=0.05 MIU/L-ACNC: 0.83 UIU/ML (ref 0.45–4.5)

## 2023-10-03 PROCEDURE — 83036 HEMOGLOBIN GLYCOSYLATED A1C: CPT

## 2023-10-03 PROCEDURE — 80053 COMPREHEN METABOLIC PANEL: CPT

## 2023-10-03 PROCEDURE — 36415 COLL VENOUS BLD VENIPUNCTURE: CPT

## 2023-10-03 PROCEDURE — 80061 LIPID PANEL: CPT

## 2023-10-03 PROCEDURE — 84443 ASSAY THYROID STIM HORMONE: CPT

## 2023-10-04 LAB
EST. AVERAGE GLUCOSE BLD GHB EST-MCNC: 88 MG/DL
HBA1C MFR BLD: 4.7 %

## 2023-10-10 ENCOUNTER — OFFICE VISIT (OUTPATIENT)
Dept: FAMILY MEDICINE CLINIC | Facility: CLINIC | Age: 73
End: 2023-10-10
Payer: MEDICARE

## 2023-10-10 ENCOUNTER — APPOINTMENT (OUTPATIENT)
Dept: LAB | Facility: CLINIC | Age: 73
End: 2023-10-10
Payer: MEDICARE

## 2023-10-10 VITALS
RESPIRATION RATE: 18 BRPM | WEIGHT: 173 LBS | DIASTOLIC BLOOD PRESSURE: 72 MMHG | TEMPERATURE: 97.8 F | BODY MASS INDEX: 32.42 KG/M2 | SYSTOLIC BLOOD PRESSURE: 120 MMHG | HEART RATE: 82 BPM

## 2023-10-10 DIAGNOSIS — Z91.89 FRAMINGHAM CARDIAC RISK 10-20% IN NEXT 10 YEARS: ICD-10-CM

## 2023-10-10 DIAGNOSIS — E03.8 OTHER SPECIFIED HYPOTHYROIDISM: ICD-10-CM

## 2023-10-10 DIAGNOSIS — E80.6 HYPERBILIRUBINEMIA: ICD-10-CM

## 2023-10-10 DIAGNOSIS — E80.6 HYPERBILIRUBINEMIA: Primary | ICD-10-CM

## 2023-10-10 DIAGNOSIS — N18.2 CKD (CHRONIC KIDNEY DISEASE) STAGE 2, GFR 60-89 ML/MIN: ICD-10-CM

## 2023-10-10 DIAGNOSIS — R73.03 PREDIABETES: ICD-10-CM

## 2023-10-10 DIAGNOSIS — I10 ESSENTIAL HYPERTENSION: ICD-10-CM

## 2023-10-10 LAB
ALBUMIN SERPL BCP-MCNC: 4.3 G/DL (ref 3.5–5)
ALP SERPL-CCNC: 75 U/L (ref 34–104)
ALT SERPL W P-5'-P-CCNC: 14 U/L (ref 7–52)
ANION GAP SERPL CALCULATED.3IONS-SCNC: 5 MMOL/L
AST SERPL W P-5'-P-CCNC: 18 U/L (ref 13–39)
BILIRUB SERPL-MCNC: 1.7 MG/DL (ref 0.2–1)
BUN SERPL-MCNC: 14 MG/DL (ref 5–25)
CALCIUM SERPL-MCNC: 9.4 MG/DL (ref 8.4–10.2)
CHLORIDE SERPL-SCNC: 105 MMOL/L (ref 96–108)
CO2 SERPL-SCNC: 28 MMOL/L (ref 21–32)
CREAT SERPL-MCNC: 0.71 MG/DL (ref 0.6–1.3)
GFR SERPL CREATININE-BSD FRML MDRD: 84 ML/MIN/1.73SQ M
GLUCOSE SERPL-MCNC: 93 MG/DL (ref 65–140)
POTASSIUM SERPL-SCNC: 3.7 MMOL/L (ref 3.5–5.3)
PROT SERPL-MCNC: 7 G/DL (ref 6.4–8.4)
SODIUM SERPL-SCNC: 138 MMOL/L (ref 135–147)

## 2023-10-10 PROCEDURE — 36415 COLL VENOUS BLD VENIPUNCTURE: CPT

## 2023-10-10 PROCEDURE — 99214 OFFICE O/P EST MOD 30 MIN: CPT | Performed by: FAMILY MEDICINE

## 2023-10-10 PROCEDURE — 80053 COMPREHEN METABOLIC PANEL: CPT

## 2023-10-10 RX ORDER — LEVOTHYROXINE SODIUM 0.05 MG/1
50 TABLET ORAL DAILY
Qty: 90 TABLET | Refills: 1 | Status: SHIPPED | OUTPATIENT
Start: 2023-10-10

## 2023-10-10 RX ORDER — LISINOPRIL AND HYDROCHLOROTHIAZIDE 20; 12.5 MG/1; MG/1
1 TABLET ORAL DAILY
Qty: 90 TABLET | Refills: 1 | Status: SHIPPED | OUTPATIENT
Start: 2023-10-10

## 2023-10-10 NOTE — PROGRESS NOTES
Assessment/Plan:    No problem-specific Assessment & Plan notes found for this encounter. Recheck bilirubin due to elevation  If remains elevated will check US liver  Consider gi eval    Declines statin for risk reduction  Largo score aware    Prediabetes  Diet advised  Exercise and wt loss    Htn/thyroid stable, f/u q6m     Diagnoses and all orders for this visit:    Hyperbilirubinemia  -     Comprehensive metabolic panel; Future    Prediabetes  -     Comprehensive metabolic panel; Future  -     Hemoglobin A1C; Future    Essential hypertension  -     Comprehensive metabolic panel; Future  -     lisinopril-hydrochlorothiazide (PRINZIDE,ZESTORETIC) 20-12.5 MG per tablet; Take 1 tablet by mouth daily    Other specified hypothyroidism  -     TSH, 3rd generation; Future  -     levothyroxine (Euthyrox) 50 mcg tablet; Take 1 tablet (50 mcg total) by mouth daily    CKD (chronic kidney disease) stage 2, GFR 60-89 ml/min    Largo cardiac risk 10-20% in next 10 years        Return in about 6 months (around 4/10/2024). Subjective:      Patient ID: Aramis Boyd is a 75 y.o. female. Chief Complaint   Patient presents with    Follow-up     HK CMA        HPI  Labs reviewed  Tolerating meds  Elevated bilirubin in past  No abd pain or n/v/jaundice    The following portions of the patient's history were reviewed and updated as appropriate: allergies, current medications, past family history, past medical history, past social history, past surgical history and problem list.    Review of Systems   Constitutional:  Negative for chills and fever. Gastrointestinal:  Negative for nausea and vomiting.          Current Outpatient Medications   Medication Sig Dispense Refill    levothyroxine (Euthyrox) 50 mcg tablet Take 1 tablet (50 mcg total) by mouth daily 90 tablet 1    lisinopril-hydrochlorothiazide (PRINZIDE,ZESTORETIC) 20-12.5 MG per tablet Take 1 tablet by mouth daily 90 tablet 1     No current facility-administered medications for this visit. Objective:    /72   Pulse 82   Temp 97.8 °F (36.6 °C)   Resp 18   Wt 78.5 kg (173 lb)   BMI 32.42 kg/m²        Physical Exam  Vitals and nursing note reviewed. Constitutional:       General: She is not in acute distress. Appearance: She is well-developed. She is obese. She is not ill-appearing. HENT:      Head: Normocephalic. Right Ear: Tympanic membrane normal.      Left Ear: Tympanic membrane normal.   Eyes:      General: No scleral icterus. Conjunctiva/sclera: Conjunctivae normal.   Cardiovascular:      Rate and Rhythm: Normal rate and regular rhythm. Heart sounds: Murmur heard. Pulmonary:      Effort: Pulmonary effort is normal. No respiratory distress. Abdominal:      General: There is no distension. Palpations: Abdomen is soft. Tenderness: There is no abdominal tenderness. Musculoskeletal:         General: No deformity. Cervical back: Neck supple. Skin:     General: Skin is warm and dry. Coloration: Skin is not jaundiced or pale. Neurological:      Mental Status: She is alert. Motor: No weakness. Gait: Gait normal.   Psychiatric:         Behavior: Behavior normal.         Thought Content:  Thought content normal.                Maxene Ear, DO

## 2023-10-11 PROBLEM — H69.92 DYSFUNCTION OF LEFT EUSTACHIAN TUBE: Status: RESOLVED | Noted: 2018-12-04 | Resolved: 2023-10-11

## 2023-10-11 PROBLEM — R22.0 HEAD LUMP: Status: RESOLVED | Noted: 2023-05-03 | Resolved: 2023-10-11

## 2023-10-11 PROBLEM — R73.03 PREDIABETES: Status: RESOLVED | Noted: 2017-07-03 | Resolved: 2023-10-11

## 2023-10-11 PROBLEM — D17.0 LIPOMA OF NECK: Status: RESOLVED | Noted: 2023-05-23 | Resolved: 2023-10-11

## 2024-01-09 ENCOUNTER — OFFICE VISIT (OUTPATIENT)
Dept: FAMILY MEDICINE CLINIC | Facility: CLINIC | Age: 74
End: 2024-01-09
Payer: MEDICARE

## 2024-01-09 VITALS
WEIGHT: 169.4 LBS | TEMPERATURE: 98 F | SYSTOLIC BLOOD PRESSURE: 138 MMHG | HEART RATE: 63 BPM | DIASTOLIC BLOOD PRESSURE: 86 MMHG | HEIGHT: 61 IN | RESPIRATION RATE: 18 BRPM | BODY MASS INDEX: 31.98 KG/M2

## 2024-01-09 DIAGNOSIS — J06.9 ACUTE URI: Primary | ICD-10-CM

## 2024-01-09 DIAGNOSIS — Z12.31 BREAST CANCER SCREENING BY MAMMOGRAM: ICD-10-CM

## 2024-01-09 DIAGNOSIS — I10 PRIMARY HYPERTENSION: ICD-10-CM

## 2024-01-09 DIAGNOSIS — R39.9 UTI SYMPTOMS: ICD-10-CM

## 2024-01-09 DIAGNOSIS — L82.1 SEBORRHEIC KERATOSES: ICD-10-CM

## 2024-01-09 LAB
SL AMB  POCT GLUCOSE, UA: NEGATIVE
SL AMB LEUKOCYTE ESTERASE,UA: NEGATIVE
SL AMB POCT BILIRUBIN,UA: NORMAL
SL AMB POCT BLOOD,UA: NORMAL
SL AMB POCT CLARITY,UA: CLEAR
SL AMB POCT COLOR,UA: NORMAL
SL AMB POCT KETONES,UA: NORMAL
SL AMB POCT NITRITE,UA: NEGATIVE
SL AMB POCT PH,UA: 6
SL AMB POCT SPECIFIC GRAVITY,UA: 1.02
SL AMB POCT URINE PROTEIN: NEGATIVE
SL AMB POCT UROBILINOGEN: NORMAL

## 2024-01-09 PROCEDURE — 81003 URINALYSIS AUTO W/O SCOPE: CPT | Performed by: FAMILY MEDICINE

## 2024-01-09 PROCEDURE — 99215 OFFICE O/P EST HI 40 MIN: CPT | Performed by: FAMILY MEDICINE

## 2024-01-09 NOTE — PROGRESS NOTES
Assessment/Plan:    No problem-specific Assessment & Plan notes found for this encounter.    UA neg, prob stress incontinence    Uri, take otc as instructed, call if no better for poss abx called in    Mammo order given    SK temple, f/u if changes, offer bx    Htn/hypothyroid stable on meds     Diagnoses and all orders for this visit:    Acute URI    Breast cancer screening by mammogram  -     Mammo screening bilateral w 3d & cad; Future    UTI symptoms  -     POCT urine dip auto non-scope    Seborrheic keratoses    Primary hypertension        Return if symptoms worsen or fail to improve.    Subjective:      Patient ID: Kelley Dinero is a 73 y.o. female.    Chief Complaint   Patient presents with    Sore Throat     Symptoms started 5 days ago Meenakshi Duarte LPN      Headache    Urinary Tract Infection     Symptoms started a couple days ago    Cough    Mass     On head. Has been there for a while, but has recently been growing very quickly       HPI  Mammo due    Noted leaking with cough  No burning or frequency  Ua neg    Uri sx 4-5d   was sick  Cough  Sore throat  Headache  Runny nose but clear  No f/c  No sob    Right temple growth  Itchy  Gets irritated  No bleeding  Gradually enlarging    Taking otc corcidin hbp    The following portions of the patient's history were reviewed and updated as appropriate: allergies, current medications, past family history, past medical history, past social history, past surgical history and problem list.    Review of Systems   Respiratory:  Negative for shortness of breath.    Cardiovascular:  Negative for chest pain.         Current Outpatient Medications   Medication Sig Dispense Refill    levothyroxine (Euthyrox) 50 mcg tablet Take 1 tablet (50 mcg total) by mouth daily 90 tablet 1    lisinopril-hydrochlorothiazide (PRINZIDE,ZESTORETIC) 20-12.5 MG per tablet Take 1 tablet by mouth daily 90 tablet 1     No current facility-administered medications for this visit.  "      Objective:    /86   Pulse 63   Temp 98 °F (36.7 °C)   Resp 18   Ht 5' 1.25\" (1.556 m)   Wt 76.8 kg (169 lb 6.4 oz)   BMI 31.75 kg/m²        Physical Exam  Vitals and nursing note reviewed.   Constitutional:       General: She is not in acute distress.     Appearance: She is well-developed. She is obese. She is not ill-appearing.   HENT:      Head: Normocephalic.      Right Ear: Tympanic membrane normal.      Left Ear: Tympanic membrane normal.      Nose: Congestion present.   Eyes:      General: No scleral icterus.     Conjunctiva/sclera: Conjunctivae normal.   Cardiovascular:      Rate and Rhythm: Normal rate and regular rhythm.   Pulmonary:      Effort: Pulmonary effort is normal. No respiratory distress.      Breath sounds: No wheezing.   Abdominal:      Palpations: Abdomen is soft.   Musculoskeletal:         General: No deformity.      Cervical back: Neck supple.   Skin:     General: Skin is warm and dry.      Coloration: Skin is not pale.      Findings: Lesion present.      Comments: Crusty SK right temple, offer bx in future if interested   Neurological:      Mental Status: She is alert.   Psychiatric:         Mood and Affect: Mood normal.         Behavior: Behavior normal.         Thought Content: Thought content normal.         41 minutes spent with patient and with chart review and documentation completion.         Rick Humphrey DO    "

## 2024-01-09 NOTE — PATIENT INSTRUCTIONS
"  Over-the-counter products can be useful for your symptoms:                      <<GUAIFENESIN>> is an expectorant that is useful for thick mucus.    Often found in Robitussin and Mucinex products.  Safe for high bp.                        <<DEXTROMETHORPHAN>> is a cough suppressant that works in the brain   to help reduce bothersome cough.   Use with caution with other medications that work in the brain.                        <<ANTI-HISTAMINES>> are useful as allergy medications and to help dry up   bothersome thin secretions. Less sedating options include   Claritin, Zyrtec, Allegra and Xyzal and have generic OTC forms.                       <<PSEUDOEPHEDRINE, PHENYLEPHRINE>> are stimulant decongestants available \"behind the counter\"  that can be used for congestion and sinus pressure.   Phenylephrine recently found not to be very effective according to the FDA.      Avoid or use with caution with high blood pressure or heart conditions.     <<NASAL SPRAYS>> Steroid nasal sprays (flonase, nasacort) are used for allergies but   can be used for congestion also.  Safe for high blood pressure.   Afrin is a decongestant that works quickly but for up to 3 days.    Today, please try some mucinex DM or equivalent and a nasal spray such as flonase.  If you gradually feel better over the next 3-4 days, then good.  But if this Thursday of Friday you have fever or green or yellow mucus, please call me so I can call in an antibiotic better.    "

## 2024-02-21 PROBLEM — J06.9 ACUTE URI: Status: RESOLVED | Noted: 2024-01-09 | Resolved: 2024-02-21

## 2024-02-21 PROBLEM — Z00.00 MEDICARE ANNUAL WELLNESS VISIT, SUBSEQUENT: Status: RESOLVED | Noted: 2019-03-12 | Resolved: 2024-02-21

## 2024-03-28 DIAGNOSIS — E03.8 OTHER SPECIFIED HYPOTHYROIDISM: ICD-10-CM

## 2024-03-28 DIAGNOSIS — I10 ESSENTIAL HYPERTENSION: ICD-10-CM

## 2024-03-28 RX ORDER — LEVOTHYROXINE SODIUM 0.05 MG/1
50 TABLET ORAL DAILY
Qty: 90 TABLET | Refills: 0 | Status: SHIPPED | OUTPATIENT
Start: 2024-03-28

## 2024-03-28 RX ORDER — LISINOPRIL AND HYDROCHLOROTHIAZIDE 20; 12.5 MG/1; MG/1
1 TABLET ORAL DAILY
Qty: 90 TABLET | Refills: 0 | Status: SHIPPED | OUTPATIENT
Start: 2024-03-28

## 2024-04-09 ENCOUNTER — APPOINTMENT (OUTPATIENT)
Dept: LAB | Facility: CLINIC | Age: 74
End: 2024-04-09
Payer: MEDICARE

## 2024-04-09 DIAGNOSIS — E03.8 OTHER SPECIFIED HYPOTHYROIDISM: ICD-10-CM

## 2024-04-09 DIAGNOSIS — R73.03 PREDIABETES: ICD-10-CM

## 2024-04-09 DIAGNOSIS — I10 ESSENTIAL HYPERTENSION: ICD-10-CM

## 2024-04-09 LAB
ALBUMIN SERPL BCP-MCNC: 4.4 G/DL (ref 3.5–5)
ALP SERPL-CCNC: 68 U/L (ref 34–104)
ALT SERPL W P-5'-P-CCNC: 16 U/L (ref 7–52)
ANION GAP SERPL CALCULATED.3IONS-SCNC: 8 MMOL/L (ref 4–13)
AST SERPL W P-5'-P-CCNC: 21 U/L (ref 13–39)
BILIRUB SERPL-MCNC: 1.7 MG/DL (ref 0.2–1)
BUN SERPL-MCNC: 11 MG/DL (ref 5–25)
CALCIUM SERPL-MCNC: 8.9 MG/DL (ref 8.4–10.2)
CHLORIDE SERPL-SCNC: 104 MMOL/L (ref 96–108)
CO2 SERPL-SCNC: 28 MMOL/L (ref 21–32)
CREAT SERPL-MCNC: 0.68 MG/DL (ref 0.6–1.3)
EST. AVERAGE GLUCOSE BLD GHB EST-MCNC: 80 MG/DL
GFR SERPL CREATININE-BSD FRML MDRD: 85 ML/MIN/1.73SQ M
GLUCOSE P FAST SERPL-MCNC: 97 MG/DL (ref 65–99)
HBA1C MFR BLD: 4.4 %
POTASSIUM SERPL-SCNC: 4.2 MMOL/L (ref 3.5–5.3)
PROT SERPL-MCNC: 6.7 G/DL (ref 6.4–8.4)
SODIUM SERPL-SCNC: 140 MMOL/L (ref 135–147)
TSH SERPL DL<=0.05 MIU/L-ACNC: 1.07 UIU/ML (ref 0.45–4.5)

## 2024-04-09 PROCEDURE — 84443 ASSAY THYROID STIM HORMONE: CPT

## 2024-04-09 PROCEDURE — 80053 COMPREHEN METABOLIC PANEL: CPT

## 2024-04-09 PROCEDURE — 83036 HEMOGLOBIN GLYCOSYLATED A1C: CPT

## 2024-04-09 PROCEDURE — 36415 COLL VENOUS BLD VENIPUNCTURE: CPT

## 2024-04-13 PROBLEM — R39.9 UTI SYMPTOMS: Status: RESOLVED | Noted: 2024-01-09 | Resolved: 2024-04-13

## 2024-04-16 ENCOUNTER — OFFICE VISIT (OUTPATIENT)
Dept: FAMILY MEDICINE CLINIC | Facility: CLINIC | Age: 74
End: 2024-04-16
Payer: MEDICARE

## 2024-04-16 VITALS
WEIGHT: 174.2 LBS | HEIGHT: 61 IN | TEMPERATURE: 99.7 F | SYSTOLIC BLOOD PRESSURE: 122 MMHG | BODY MASS INDEX: 32.89 KG/M2 | RESPIRATION RATE: 16 BRPM | DIASTOLIC BLOOD PRESSURE: 70 MMHG | HEART RATE: 64 BPM

## 2024-04-16 DIAGNOSIS — Z13.6 SCREENING FOR CARDIOVASCULAR, RESPIRATORY, AND GENITOURINARY DISEASES: ICD-10-CM

## 2024-04-16 DIAGNOSIS — Z00.00 MEDICARE ANNUAL WELLNESS VISIT, SUBSEQUENT: Primary | ICD-10-CM

## 2024-04-16 DIAGNOSIS — E66.9 OBESITY (BMI 30-39.9): ICD-10-CM

## 2024-04-16 DIAGNOSIS — Z13.83 SCREENING FOR CARDIOVASCULAR, RESPIRATORY, AND GENITOURINARY DISEASES: ICD-10-CM

## 2024-04-16 DIAGNOSIS — Z13.89 SCREENING FOR CARDIOVASCULAR, RESPIRATORY, AND GENITOURINARY DISEASES: ICD-10-CM

## 2024-04-16 DIAGNOSIS — I10 ESSENTIAL HYPERTENSION: ICD-10-CM

## 2024-04-16 DIAGNOSIS — E03.8 OTHER SPECIFIED HYPOTHYROIDISM: ICD-10-CM

## 2024-04-16 PROCEDURE — G0439 PPPS, SUBSEQ VISIT: HCPCS | Performed by: FAMILY MEDICINE

## 2024-04-16 PROCEDURE — 99214 OFFICE O/P EST MOD 30 MIN: CPT | Performed by: FAMILY MEDICINE

## 2024-04-16 RX ORDER — LEVOTHYROXINE SODIUM 0.05 MG/1
50 TABLET ORAL DAILY
Qty: 90 TABLET | Refills: 1 | Status: SHIPPED | OUTPATIENT
Start: 2024-04-16

## 2024-04-16 RX ORDER — LISINOPRIL AND HYDROCHLOROTHIAZIDE 20; 12.5 MG/1; MG/1
1 TABLET ORAL DAILY
Qty: 90 TABLET | Refills: 1 | Status: SHIPPED | OUTPATIENT
Start: 2024-04-16

## 2024-04-16 NOTE — PROGRESS NOTES
"Assessment/Plan:    No problem-specific Assessment & Plan notes found for this encounter.    Labs reviewed  Probable Bush[place]  Hypothyroid stable with TSH 1.069, f/u q6m  Htn stable on meds  Bmi 32, encourage wt loss     Diagnoses and all orders for this visit:    Medicare annual wellness visit, subsequent    Essential hypertension  -     Comprehensive metabolic panel; Future  -     lisinopril-hydrochlorothiazide (PRINZIDE,ZESTORETIC) 20-12.5 MG per tablet; Take 1 tablet by mouth daily    Other specified hypothyroidism  -     TSH, 3rd generation; Future  -     levothyroxine 50 mcg tablet; Take 1 tablet (50 mcg total) by mouth daily    Screening for cardiovascular, respiratory, and genitourinary diseases  -     Lipid Panel with Direct LDL reflex; Future    Obesity (BMI 30-39.9)    BMI 32.0-32.9,adult              Return in about 6 months (around 10/16/2024).    Subjective:      Patient ID: Kelley Dinero is a 74 y.o. female.    Chief Complaint   Patient presents with    Follow-up     YC       HPI  Fram score reviewed  Not much red meat  Lots of chicken  Has carbs  No exercise program    The following portions of the patient's history were reviewed and updated as appropriate: allergies, current medications, past family history, past medical history, past social history, past surgical history and problem list.    Review of Systems   Constitutional:  Negative for chills, fever and unexpected weight change.         Current Outpatient Medications   Medication Sig Dispense Refill    levothyroxine 50 mcg tablet Take 1 tablet (50 mcg total) by mouth daily 90 tablet 1    lisinopril-hydrochlorothiazide (PRINZIDE,ZESTORETIC) 20-12.5 MG per tablet Take 1 tablet by mouth daily 90 tablet 1     No current facility-administered medications for this visit.       Objective:    /70   Pulse 64   Temp 99.7 °F (37.6 °C) (Tympanic)   Resp 16   Ht 5' 1.25\" (1.556 m)   Wt 79 kg (174 lb 3.2 oz)   BMI 32.65 kg/m²        Physical " Exam  Vitals and nursing note reviewed.   Constitutional:       General: She is not in acute distress.     Appearance: She is well-developed. She is obese. She is not ill-appearing.   HENT:      Head: Normocephalic.      Right Ear: Tympanic membrane normal.      Left Ear: Tympanic membrane normal.   Eyes:      General: No scleral icterus.     Conjunctiva/sclera: Conjunctivae normal.   Cardiovascular:      Rate and Rhythm: Normal rate.      Heart sounds: Murmur heard.   Pulmonary:      Effort: Pulmonary effort is normal. No respiratory distress.   Abdominal:      Palpations: Abdomen is soft.      Tenderness: There is no abdominal tenderness.   Musculoskeletal:         General: No deformity.      Cervical back: Neck supple.      Right lower leg: No edema.      Left lower leg: No edema.   Skin:     General: Skin is warm and dry.      Coloration: Skin is not pale.   Neurological:      Mental Status: She is alert.      Motor: No weakness.      Gait: Gait normal.   Psychiatric:         Mood and Affect: Mood normal.         Behavior: Behavior normal.         Thought Content: Thought content normal.                Rick Humphrey,

## 2024-04-18 PROBLEM — I10 ESSENTIAL HYPERTENSION: Status: ACTIVE | Noted: 2024-04-18

## 2024-04-18 PROBLEM — Z00.00 MEDICARE ANNUAL WELLNESS VISIT, SUBSEQUENT: Status: ACTIVE | Noted: 2024-04-18

## 2024-04-18 NOTE — PATIENT INSTRUCTIONS
Medicare Preventive Visit Patient Instructions  Thank you for completing your Welcome to Medicare Visit or Medicare Annual Wellness Visit today. Your next wellness visit will be due in one year (4/19/2025).  The screening/preventive services that you may require over the next 5-10 years are detailed below. Some tests may not apply to you based off risk factors and/or age. Screening tests ordered at today's visit but not completed yet may show as past due. Also, please note that scanned in results may not display below.  Preventive Screenings:  Service Recommendations Previous Testing/Comments   Colorectal Cancer Screening  * Colonoscopy    * Fecal Occult Blood Test (FOBT)/Fecal Immunochemical Test (FIT)  * Fecal DNA/Cologuard Test  * Flexible Sigmoidoscopy Age: 45-75 years old   Colonoscopy: every 10 years (may be performed more frequently if at higher risk)  OR  FOBT/FIT: every 1 year  OR  Cologuard: every 3 years  OR  Sigmoidoscopy: every 5 years  Screening may be recommended earlier than age 45 if at higher risk for colorectal cancer. Also, an individualized decision between you and your healthcare provider will decide whether screening between the ages of 76-85 would be appropriate. Colonoscopy: 08/18/2020  FOBT/FIT: Not on file  Cologuard: Not on file  Sigmoidoscopy: Not on file          Breast Cancer Screening Age: 40+ years old  Frequency: every 1-2 years  Not required if history of left and right mastectomy Mammogram: 03/21/2023        Cervical Cancer Screening Between the ages of 21-29, pap smear recommended once every 3 years.   Between the ages of 30-65, can perform pap smear with HPV co-testing every 5 years.   Recommendations may differ for women with a history of total hysterectomy, cervical cancer, or abnormal pap smears in past. Pap Smear: Not on file        Hepatitis C Screening Once for adults born between 1945 and 1965  More frequently in patients at high risk for Hepatitis C Hep C Antibody:  09/11/2018        Diabetes Screening 1-2 times per year if you're at risk for diabetes or have pre-diabetes Fasting glucose: 97 mg/dL (4/9/2024)  A1C: 4.4 % (4/9/2024)      Cholesterol Screening Once every 5 years if you don't have a lipid disorder. May order more often based on risk factors. Lipid panel: 10/03/2023          Other Preventive Screenings Covered by Medicare:  Abdominal Aortic Aneurysm (AAA) Screening: covered once if your at risk. You're considered to be at risk if you have a family history of AAA.  Lung Cancer Screening: covers low dose CT scan once per year if you meet all of the following conditions: (1) Age 55-77; (2) No signs or symptoms of lung cancer; (3) Current smoker or have quit smoking within the last 15 years; (4) You have a tobacco smoking history of at least 20 pack years (packs per day multiplied by number of years you smoked); (5) You get a written order from a healthcare provider.  Glaucoma Screening: covered annually if you're considered high risk: (1) You have diabetes OR (2) Family history of glaucoma OR (3)  aged 50 and older OR (4)  American aged 65 and older  Osteoporosis Screening: covered every 2 years if you meet one of the following conditions: (1) You're estrogen deficient and at risk for osteoporosis based off medical history and other findings; (2) Have a vertebral abnormality; (3) On glucocorticoid therapy for more than 3 months; (4) Have primary hyperparathyroidism; (5) On osteoporosis medications and need to assess response to drug therapy.   Last bone density test (DXA Scan): Not on file.  HIV Screening: covered annually if you're between the age of 15-65. Also covered annually if you are younger than 15 and older than 65 with risk factors for HIV infection. For pregnant patients, it is covered up to 3 times per pregnancy.    Immunizations:  Immunization Recommendations   Influenza Vaccine Annual influenza vaccination during flu season is  recommended for all persons aged >= 6 months who do not have contraindications   Pneumococcal Vaccine   * Pneumococcal conjugate vaccine = PCV13 (Prevnar 13), PCV15 (Vaxneuvance), PCV20 (Prevnar 20)  * Pneumococcal polysaccharide vaccine = PPSV23 (Pneumovax) Adults 19-65 yo with certain risk factors or if 65+ yo  If never received any pneumonia vaccine: recommend Prevnar 20 (PCV20)  Give PCV20 if previously received 1 dose of PCV13 or PPSV23   Hepatitis B Vaccine 3 dose series if at intermediate or high risk (ex: diabetes, end stage renal disease, liver disease)   Respiratory syncytial virus (RSV) Vaccine - COVERED BY MEDICARE PART D  * RSVPreF3 (Arexvy) CDC recommends that adults 60 years of age and older may receive a single dose of RSV vaccine using shared clinical decision-making (SCDM)   Tetanus (Td) Vaccine - COST NOT COVERED BY MEDICARE PART B Following completion of primary series, a booster dose should be given every 10 years to maintain immunity against tetanus. Td may also be given as tetanus wound prophylaxis.   Tdap Vaccine - COST NOT COVERED BY MEDICARE PART B Recommended at least once for all adults. For pregnant patients, recommended with each pregnancy.   Shingles Vaccine (Shingrix) - COST NOT COVERED BY MEDICARE PART B  2 shot series recommended in those 19 years and older who have or will have weakened immune systems or those 50 years and older     Health Maintenance Due:      Topic Date Due   • Breast Cancer Screening: Mammogram  03/21/2024   • Colorectal Cancer Screening  08/18/2025   • Hepatitis C Screening  Completed     Immunizations Due:      Topic Date Due   • COVID-19 Vaccine (10 - 2023-24 season) 11/26/2023     Advance Directives   What are advance directives?  Advance directives are legal documents that state your wishes and plans for medical care. These plans are made ahead of time in case you lose your ability to make decisions for yourself. Advance directives can apply to any medical  decision, such as the treatments you want, and if you want to donate organs.   What are the types of advance directives?  There are many types of advance directives, and each state has rules about how to use them. You may choose a combination of any of the following:  Living will:  This is a written record of the treatment you want. You can also choose which treatments you do not want, which to limit, and which to stop at a certain time. This includes surgery, medicine, IV fluid, and tube feedings.   Durable power of  for healthcare (DPAHC):  This is a written record that states who you want to make healthcare choices for you when you are unable to make them for yourself. This person, called a proxy, is usually a family member or a friend. You may choose more than 1 proxy.  Do not resuscitate (DNR) order:  A DNR order is used in case your heart stops beating or you stop breathing. It is a request not to have certain forms of treatment, such as CPR. A DNR order may be included in other types of advance directives.  Medical directive:  This covers the care that you want if you are in a coma, near death, or unable to make decisions for yourself. You can list the treatments you want for each condition. Treatment may include pain medicine, surgery, blood transfusions, dialysis, IV or tube feedings, and a ventilator (breathing machine).  Values history:  This document has questions about your views, beliefs, and how you feel and think about life. This information can help others choose the care that you would choose.  Why are advance directives important?  An advance directive helps you control your care. Although spoken wishes may be used, it is better to have your wishes written down. Spoken wishes can be misunderstood, or not followed. Treatments may be given even if you do not want them. An advance directive may make it easier for your family to make difficult choices about your care.   Weight Management   Why  it is important to manage your weight:  Being overweight increases your risk of health conditions such as heart disease, high blood pressure, type 2 diabetes, and certain types of cancer. It can also increase your risk for osteoarthritis, sleep apnea, and other respiratory problems. Aim for a slow, steady weight loss. Even a small amount of weight loss can lower your risk of health problems.  How to lose weight safely:  A safe and healthy way to lose weight is to eat fewer calories and get regular exercise. You can lose up about 1 pound a week by decreasing the number of calories you eat by 500 calories each day.   Healthy meal plan for weight management:  A healthy meal plan includes a variety of foods, contains fewer calories, and helps you stay healthy. A healthy meal plan includes the following:  Eat whole-grain foods more often.  A healthy meal plan should contain fiber. Fiber is the part of grains, fruits, and vegetables that is not broken down by your body. Whole-grain foods are healthy and provide extra fiber in your diet. Some examples of whole-grain foods are whole-wheat breads and pastas, oatmeal, brown rice, and bulgur.  Eat a variety of vegetables every day.  Include dark, leafy greens such as spinach, kale, morgan greens, and mustard greens. Eat yellow and orange vegetables such as carrots, sweet potatoes, and winter squash.   Eat a variety of fruits every day.  Choose fresh or canned fruit (canned in its own juice or light syrup) instead of juice. Fruit juice has very little or no fiber.  Eat low-fat dairy foods.  Drink fat-free (skim) milk or 1% milk. Eat fat-free yogurt and low-fat cottage cheese. Try low-fat cheeses such as mozzarella and other reduced-fat cheeses.  Choose meat and other protein foods that are low in fat.  Choose beans or other legumes such as split peas or lentils. Choose fish, skinless poultry (chicken or turkey), or lean cuts of red meat (beef or pork). Before you cook meat or  poultry, cut off any visible fat.   Use less fat and oil.  Try baking foods instead of frying them. Add less fat, such as margarine, sour cream, regular salad dressing and mayonnaise to foods. Eat fewer high-fat foods. Some examples of high-fat foods include french fries, doughnuts, ice cream, and cakes.  Eat fewer sweets.  Limit foods and drinks that are high in sugar. This includes candy, cookies, regular soda, and sweetened drinks.  Exercise:  Exercise at least 30 minutes per day on most days of the week. Some examples of exercise include walking, biking, dancing, and swimming. You can also fit in more physical activity by taking the stairs instead of the elevator or parking farther away from stores. Ask your healthcare provider about the best exercise plan for you.      © Copyright [employer] 2018 Information is for End User's use only and may not be sold, redistributed or otherwise used for commercial purposes. All illustrations and images included in CareNotes® are the copyrighted property of [employer] or [employer]

## 2024-04-18 NOTE — PROGRESS NOTES
Assessment and Plan:     Problem List Items Addressed This Visit          Active Problems    Medicare annual wellness visit, subsequent - Primary    BMI 32.0-32.9,adult    Essential hypertension    Relevant Medications    lisinopril-hydrochlorothiazide (PRINZIDE,ZESTORETIC) 20-12.5 MG per tablet    Other Relevant Orders    Comprehensive metabolic panel    Other specified hypothyroidism    Relevant Medications    levothyroxine 50 mcg tablet    Other Relevant Orders    TSH, 3rd generation    Obesity (BMI 30-39.9)     Other Visit Diagnoses       Screening for cardiovascular, respiratory, and genitourinary diseases        Relevant Orders    Lipid Panel with Direct LDL reflex             Preventive health issues were discussed with patient, and age appropriate screening tests were ordered as noted in patient's After Visit Summary.  Personalized health advice and appropriate referrals for health education or preventive services given if needed, as noted in patient's After Visit Summary.     History of Present Illness:     Patient presents for a Medicare Wellness Visit    HPI   Patient Care Team:  Rick Humphrey DO as PCP - General (Family Medicine)  Payam Genao MD (Surgical Oncology)     Review of Systems:     Review of Systems     Problem List:     Patient Active Problem List   Diagnosis    Other specified hypothyroidism    Primary hypertension    Cardiac murmur    Obesity (BMI 30-39.9)    Screening breast examination    Simple hepatic cyst    Hyperbilirubinemia    Left foot pain    Bone island    Breast cancer screening by mammogram    Postmenopausal state    CKD (chronic kidney disease) stage 2, GFR 60-89 ml/min    [place] cardiac risk 10-20% in next 10 years    Seborrheic keratoses    Medicare annual wellness visit, subsequent    BMI 32.0-32.9,adult    Essential hypertension      Past Medical and Surgical History:     Past Medical History:   Diagnosis Date    Disease of thyroid gland     Hypertension      Myalgia 8/3/2017    Right shoulder pain 5/27/2017    Viral upper respiratory tract infection 3/6/2018    Viral URI with cough 3/6/2018     Past Surgical History:   Procedure Laterality Date    FACIAL/NECK BIOPSY N/A 6/14/2023    Procedure: EXCISION OF POSTERIOR NECK MASS;  Surgeon: Payam Genao MD;  Location: [facility];  Service: Surgical Oncology    HYSTERECTOMY      OOPHORECTOMY        Family History:     Family History   Problem Relation Age of Onset    No Known Problems Mother     No Known Problems Father     Thyroid disease Sister     Thyroid disease Brother     Thyroid disease Sister     Thyroid disease Sister     Thyroid disease Brother     Thyroid disease Brother       Social History:     Social History     Socioeconomic History    Marital status: /Civil Union     Spouse name: None    Number of children: None    Years of education: None    Highest education level: None   Occupational History    None   Tobacco Use    Smoking status: Never     Passive exposure: Never    Smokeless tobacco: Never   Vaping Use    Vaping status: Never Used   Substance and Sexual Activity    Alcohol use: Never    Drug use: Never    Sexual activity: Not Currently   Other Topics Concern    None   Social History Narrative    None     Social Determinants of Health     Financial Resource Strain: Low Risk  (2/10/2023)    Overall Financial Resource Strain (CARDIA)     Difficulty of Paying Living Expenses: Not hard at all   Food Insecurity: Patient Declined (4/18/2024)    Hunger Vital Sign     Worried About Running Out of Food in the Last Year: Patient declined     Ran Out of Food in the Last Year: Patient declined   Transportation Needs: Patient Declined (4/18/2024)    PRAPARE - Transportation     Lack of Transportation (Medical): Patient declined     Lack of Transportation (Non-Medical): Patient declined   Physical Activity: Not on file   Stress: Not on file   Social Connections: Not on file   Intimate Partner Violence: Not on  file   Housing Stability: Patient Declined (4/18/2024)    Housing Stability Vital Sign     Unable to Pay for Housing in the Last Year: Patient declined     Number of Places Lived in the Last Year: 0     Unstable Housing in the Last Year: Patient declined      Medications and Allergies:     Current Outpatient Medications   Medication Sig Dispense Refill    levothyroxine 50 mcg tablet Take 1 tablet (50 mcg total) by mouth daily 90 tablet 1    lisinopril-hydrochlorothiazide (PRINZIDE,ZESTORETIC) 20-12.5 MG per tablet Take 1 tablet by mouth daily 90 tablet 1     No current facility-administered medications for this visit.     No Known Allergies   Immunizations:     Immunization History   Administered Date(s) Administered    COVID-19 MODERNA VACC 0.25 ML IM BOOSTER 11/05/2021, 04/29/2022    COVID-19 MODERNA VACC 0.5 ML IM 01/23/2021, 01/23/2021, 02/20/2021, 02/20/2021, 11/05/2021, 04/29/2022    COVID-19 Moderna mRNA Vaccine 12 Yr+ 50 mcg/0.5 mL (Spikevax) 10/01/2023    Influenza, high dose seasonal 0.7 mL 10/05/2020, 10/27/2021, 10/01/2023    Pneumococcal Conjugate Vaccine 20-valent (Pcv20), Polysace 09/14/2022, 09/14/2022      Health Maintenance:         Topic Date Due    Breast Cancer Screening: Mammogram  03/21/2024    Colorectal Cancer Screening  08/18/2025    Hepatitis C Screening  Completed         Topic Date Due    COVID-19 Vaccine (10 - 2023-24 season) 11/26/2023      Medicare Screening Tests and Risk Assessments:     Kelley is here for her Subsequent Wellness visit. Last Medicare Wellness visit information reviewed, patient interviewed and updates made to the record today.      Health Risk Assessment:   Patient rates overall health as very good. Patient feels that their physical health rating is same. Patient is very satisfied with their life. Eyesight was rated as same. Hearing was rated as same. Patient feels that their emotional and mental health rating is same. Patients states they are never, rarely angry.  Patient states they are never, rarely unusually tired/fatigued. Pain experienced in the last 7 days has been none. Patient states that she has experienced no weight loss or gain in last 6 months.     Depression Screening:   PHQ-2 Score: 0      Fall Risk Screening:   In the past year, patient has experienced: no history of falling in past year      Urinary Incontinence Screening:   Patient has not leaked urine accidently in the last six months.     Home Safety:  Patient does not have trouble with stairs inside or outside of their home. Patient has working smoke alarms and has working carbon monoxide detector. Home safety hazards include: none.     Nutrition:   Current diet is Regular.     Medications:   Patient is not currently taking any over-the-counter supplements. Patient is able to manage medications.     Activities of Daily Living (ADLs)/Instrumental Activities of Daily Living (IADLs):   Walk and transfer into and out of bed and chair?: Yes  Dress and groom yourself?: Yes    Bathe or shower yourself?: Yes    Feed yourself? Yes  Do your laundry/housekeeping?: Yes  Manage your money, pay your bills and track your expenses?: Yes  Make your own meals?: Yes    Do your own shopping?: Yes    Previous Hospitalizations:   Any hospitalizations or ED visits within the last 12 months?: No      Advance Care Planning:   Living will: No    Durable POA for healthcare: Yes    Advanced directive: Yes    End of Life Decisions reviewed with patient: No      Cognitive Screening:   Provider or family/friend/caregiver concerned regarding cognition?: No    PREVENTIVE SCREENINGS      Cardiovascular Screening:    General: Screening Not Indicated and History Lipid Disorder      Diabetes Screening:     General: Screening Not Indicated and History Diabetes      Colorectal Cancer Screening:     General: Screening Current      Breast Cancer Screening:     General: Screening Current      Cervical Cancer Screening:    General: Screening Not  "Indicated      Osteoporosis Screening:    General: Risks and Benefits Discussed      Abdominal Aortic Aneurysm (AAA) Screening:        General: Screening Not Indicated      Lung Cancer Screening:     General: Screening Not Indicated      Hepatitis C Screening:    General: Screening Current    Hep C Screening Accepted: No     Screening, Brief Intervention, and Referral to Treatment (SBIRT)    Screening  Typical number of drinks in a day: 0  Typical number of drinks in a week: 0  Interpretation: Low risk drinking behavior.    Single Item Drug Screening:  How often have you used an illegal drug (including marijuana) or a prescription medication for non-medical reasons in the past year? never    Single Item Drug Screen Score: 0  Interpretation: Negative screen for possible drug use disorder    Other Counseling Topics:   Car/seat belt/driving safety and regular weightbearing exercise.     No results found.     Physical Exam:     /70   Pulse 64   Temp 99.7 °F (37.6 °C) (Tympanic)   Resp 16   Ht 5' 1.25\" (1.556 m)   Wt 79 kg (174 lb 3.2 oz)   BMI 32.65 kg/m²     Physical Exam     Rick Humphrey, DO  "

## 2024-05-18 PROBLEM — Z00.00 MEDICARE ANNUAL WELLNESS VISIT, SUBSEQUENT: Status: RESOLVED | Noted: 2024-04-18 | Resolved: 2024-05-18

## 2024-05-20 ENCOUNTER — HOSPITAL ENCOUNTER (OUTPATIENT)
Dept: RADIOLOGY | Facility: HOSPITAL | Age: 74
Discharge: HOME | End: 2024-05-20
Attending: FAMILY MEDICINE
Payer: MEDICARE

## 2024-05-20 VITALS — HEIGHT: 62 IN | WEIGHT: 165 LBS | BODY MASS INDEX: 30.36 KG/M2

## 2024-05-20 DIAGNOSIS — Z12.31 BREAST CANCER SCREENING BY MAMMOGRAM: ICD-10-CM

## 2024-05-20 PROCEDURE — 77063 BREAST TOMOSYNTHESIS BI: CPT

## 2024-05-20 PROCEDURE — 77067 SCR MAMMO BI INCL CAD: CPT

## 2024-07-12 DIAGNOSIS — I10 ESSENTIAL HYPERTENSION: ICD-10-CM

## 2024-07-12 DIAGNOSIS — E03.8 OTHER SPECIFIED HYPOTHYROIDISM: ICD-10-CM

## 2024-07-12 NOTE — TELEPHONE ENCOUNTER
Reason for call:   [x] Refill   [] Prior Auth  [] Other:     Office:   [x] PCP/Provider -   [] Specialty/Provider -           Does the patient have enough for 3 days?   [x] Yes   [] No - Send as HP to POD

## 2024-07-15 RX ORDER — LISINOPRIL AND HYDROCHLOROTHIAZIDE 20; 12.5 MG/1; MG/1
1 TABLET ORAL DAILY
Qty: 90 TABLET | Refills: 1 | Status: SHIPPED | OUTPATIENT
Start: 2024-07-15

## 2024-07-15 RX ORDER — LEVOTHYROXINE SODIUM 0.05 MG/1
50 TABLET ORAL DAILY
Qty: 90 TABLET | Refills: 1 | Status: SHIPPED | OUTPATIENT
Start: 2024-07-15

## 2024-07-17 ENCOUNTER — HOSPITAL ENCOUNTER (OUTPATIENT)
Dept: RADIOLOGY | Facility: HOSPITAL | Age: 74
Discharge: HOME | End: 2024-07-17
Attending: FAMILY MEDICINE
Payer: MEDICARE

## 2024-07-17 VITALS — WEIGHT: 165 LBS | BODY MASS INDEX: 30.36 KG/M2 | HEIGHT: 62 IN

## 2024-07-17 DIAGNOSIS — R92.8 ABNORMAL SCREENING MAMMOGRAM: ICD-10-CM

## 2024-07-17 PROCEDURE — 77065 DX MAMMO INCL CAD UNI: CPT

## 2024-07-17 PROCEDURE — G0279 TOMOSYNTHESIS, MAMMO: HCPCS

## 2024-07-17 PROCEDURE — 76642 ULTRASOUND BREAST LIMITED: CPT

## 2024-10-09 ENCOUNTER — APPOINTMENT (OUTPATIENT)
Dept: LAB | Facility: CLINIC | Age: 74
End: 2024-10-09
Payer: MEDICARE

## 2024-10-09 DIAGNOSIS — Z13.89 SCREENING FOR CARDIOVASCULAR, RESPIRATORY, AND GENITOURINARY DISEASES: ICD-10-CM

## 2024-10-09 DIAGNOSIS — Z13.6 SCREENING FOR CARDIOVASCULAR, RESPIRATORY, AND GENITOURINARY DISEASES: ICD-10-CM

## 2024-10-09 DIAGNOSIS — E03.8 OTHER SPECIFIED HYPOTHYROIDISM: ICD-10-CM

## 2024-10-09 DIAGNOSIS — Z13.83 SCREENING FOR CARDIOVASCULAR, RESPIRATORY, AND GENITOURINARY DISEASES: ICD-10-CM

## 2024-10-09 DIAGNOSIS — I10 ESSENTIAL HYPERTENSION: ICD-10-CM

## 2024-10-09 LAB
ALBUMIN SERPL BCG-MCNC: 4.6 G/DL (ref 3.5–5)
ALP SERPL-CCNC: 73 U/L (ref 34–104)
ALT SERPL W P-5'-P-CCNC: 15 U/L (ref 7–52)
ANION GAP SERPL CALCULATED.3IONS-SCNC: 6 MMOL/L (ref 4–13)
AST SERPL W P-5'-P-CCNC: 21 U/L (ref 13–39)
BILIRUB SERPL-MCNC: 2.16 MG/DL (ref 0.2–1)
BUN SERPL-MCNC: 15 MG/DL (ref 5–25)
CALCIUM SERPL-MCNC: 9.2 MG/DL (ref 8.4–10.2)
CHLORIDE SERPL-SCNC: 103 MMOL/L (ref 96–108)
CHOLEST SERPL-MCNC: 205 MG/DL
CO2 SERPL-SCNC: 29 MMOL/L (ref 21–32)
CREAT SERPL-MCNC: 0.64 MG/DL (ref 0.6–1.3)
GFR SERPL CREATININE-BSD FRML MDRD: 87 ML/MIN/1.73SQ M
GLUCOSE P FAST SERPL-MCNC: 100 MG/DL (ref 65–99)
HDLC SERPL-MCNC: 59 MG/DL
LDLC SERPL CALC-MCNC: 118 MG/DL (ref 0–100)
POTASSIUM SERPL-SCNC: 4.3 MMOL/L (ref 3.5–5.3)
PROT SERPL-MCNC: 7.1 G/DL (ref 6.4–8.4)
SODIUM SERPL-SCNC: 138 MMOL/L (ref 135–147)
TRIGL SERPL-MCNC: 138 MG/DL
TSH SERPL DL<=0.05 MIU/L-ACNC: 1.26 UIU/ML (ref 0.45–4.5)

## 2024-10-09 PROCEDURE — 80053 COMPREHEN METABOLIC PANEL: CPT

## 2024-10-09 PROCEDURE — 80061 LIPID PANEL: CPT

## 2024-10-09 PROCEDURE — 36415 COLL VENOUS BLD VENIPUNCTURE: CPT

## 2024-10-09 PROCEDURE — 84443 ASSAY THYROID STIM HORMONE: CPT

## 2024-10-19 RX ORDER — BRIMONIDINE TARTRATE 2 MG/ML
SOLUTION/ DROPS OPHTHALMIC
COMMUNITY
Start: 2024-08-26

## 2024-10-22 ENCOUNTER — TELEPHONE (OUTPATIENT)
Age: 74
End: 2024-10-22

## 2024-10-22 ENCOUNTER — OFFICE VISIT (OUTPATIENT)
Dept: FAMILY MEDICINE CLINIC | Facility: CLINIC | Age: 74
End: 2024-10-22
Payer: MEDICARE

## 2024-10-22 VITALS
OXYGEN SATURATION: 98 % | RESPIRATION RATE: 16 BRPM | WEIGHT: 179.8 LBS | BODY MASS INDEX: 33.09 KG/M2 | HEIGHT: 62 IN | TEMPERATURE: 98 F | DIASTOLIC BLOOD PRESSURE: 70 MMHG | HEART RATE: 82 BPM | SYSTOLIC BLOOD PRESSURE: 110 MMHG

## 2024-10-22 DIAGNOSIS — E03.8 OTHER SPECIFIED HYPOTHYROIDISM: ICD-10-CM

## 2024-10-22 DIAGNOSIS — E78.49 OTHER HYPERLIPIDEMIA: ICD-10-CM

## 2024-10-22 DIAGNOSIS — Z13.220 SCREENING FOR LIPID DISORDERS: ICD-10-CM

## 2024-10-22 DIAGNOSIS — Z12.31 BREAST CANCER SCREENING BY MAMMOGRAM: Primary | ICD-10-CM

## 2024-10-22 DIAGNOSIS — R73.03 PREDIABETES: ICD-10-CM

## 2024-10-22 DIAGNOSIS — Z13.1 SCREENING FOR DIABETES MELLITUS (DM): ICD-10-CM

## 2024-10-22 DIAGNOSIS — Z91.89 FRAMINGHAM CARDIAC RISK 10-20% IN NEXT 10 YEARS: ICD-10-CM

## 2024-10-22 DIAGNOSIS — I10 ESSENTIAL HYPERTENSION: Primary | ICD-10-CM

## 2024-10-22 PROCEDURE — G2211 COMPLEX E/M VISIT ADD ON: HCPCS | Performed by: FAMILY MEDICINE

## 2024-10-22 PROCEDURE — 99214 OFFICE O/P EST MOD 30 MIN: CPT | Performed by: FAMILY MEDICINE

## 2024-10-22 RX ORDER — LEVOTHYROXINE SODIUM 50 UG/1
50 TABLET ORAL DAILY
Qty: 90 TABLET | Refills: 1 | Status: SHIPPED | OUTPATIENT
Start: 2024-10-22

## 2024-10-22 RX ORDER — ATORVASTATIN CALCIUM 10 MG/1
10 TABLET, FILM COATED ORAL DAILY
Qty: 90 TABLET | Refills: 1 | Status: SHIPPED | OUTPATIENT
Start: 2024-10-22

## 2024-10-22 RX ORDER — LISINOPRIL AND HYDROCHLOROTHIAZIDE 12.5; 2 MG/1; MG/1
1 TABLET ORAL DAILY
Qty: 90 TABLET | Refills: 1 | Status: SHIPPED | OUTPATIENT
Start: 2024-10-22

## 2024-10-22 NOTE — PROGRESS NOTES
Assessment/Plan:    No problem-specific Assessment & Plan notes found for this encounter.    Htn stable, f/u q6m    Hypothyroid stable, continue meds, f/u q6m    Prediabetes, diet/exercise suggested, low carbs    Fram score 16%  Willing to start lipitor for risk reduction  Risks/benefits advised  Can use CQ10     Diagnoses and all orders for this visit:    Essential hypertension  -     lisinopril-hydrochlorothiazide (PRINZIDE,ZESTORETIC) 20-12.5 MG per tablet; Take 1 tablet by mouth daily  -     Comprehensive metabolic panel; Future    Other specified hypothyroidism  -     levothyroxine 50 mcg tablet; Take 1 tablet (50 mcg total) by mouth daily  -     TSH, 3rd generation; Future    Screening for diabetes mellitus (DM)    Screening for lipid disorders    Prediabetes  -     Hemoglobin A1C; Future    Other hyperlipidemia  -     atorvastatin (LIPITOR) 10 mg tablet; Take 1 tablet (10 mg total) by mouth daily  -     Lipid Panel with Direct LDL reflex; Future    [place] cardiac risk 10-20% in next 10 years  -     atorvastatin (LIPITOR) 10 mg tablet; Take 1 tablet (10 mg total) by mouth daily    Other orders  -     brimonidine tartrate 0.2 % ophthalmic solution; instill 1 drop into right eye twice daily        Return in about 6 months (around 4/22/2025) for Recheck.    Subjective:      Patient ID: Kelley Dinero is a 74 y.o. female.    Chief Complaint   Patient presents with    Follow-up     Follow up cma         HPI  Labs reviewed   Tolerating meds  Ascvd score advised  Has gained wt, about 14#    The following portions of the patient's history were reviewed and updated as appropriate: allergies, current medications, past family history, past medical history, past social history, past surgical history and problem list.    Review of Systems   Respiratory:  Negative for shortness of breath.    Cardiovascular:  Negative for chest pain.         Current Outpatient Medications   Medication Sig Dispense Refill    atorvastatin  "(LIPITOR) 10 mg tablet Take 1 tablet (10 mg total) by mouth daily 90 tablet 1    brimonidine tartrate 0.2 % ophthalmic solution instill 1 drop into right eye twice daily      levothyroxine 50 mcg tablet Take 1 tablet (50 mcg total) by mouth daily 90 tablet 1    lisinopril-hydrochlorothiazide (PRINZIDE,ZESTORETIC) 20-12.5 MG per tablet Take 1 tablet by mouth daily 90 tablet 1     No current facility-administered medications for this visit.       Objective:    /70   Pulse 82   Temp 98 °F (36.7 °C) (Tympanic)   Resp 16   Ht 5' 2\" (1.575 m)   Wt 81.6 kg (179 lb 12.8 oz)   SpO2 98%   BMI 32.89 kg/m²        Physical Exam  Vitals and nursing note reviewed.   Constitutional:       General: She is not in acute distress.     Appearance: She is well-developed. She is obese. She is not ill-appearing.   HENT:      Head: Normocephalic.      Right Ear: Tympanic membrane normal.      Left Ear: Tympanic membrane normal.   Eyes:      General: No scleral icterus.     Conjunctiva/sclera: Conjunctivae normal.   Cardiovascular:      Rate and Rhythm: Normal rate and regular rhythm.      Heart sounds: No murmur heard.  Pulmonary:      Effort: Pulmonary effort is normal. No respiratory distress.      Breath sounds: No wheezing.   Abdominal:      Palpations: Abdomen is soft.   Musculoskeletal:         General: No deformity.      Cervical back: Neck supple.      Right lower leg: No edema.      Left lower leg: No edema.   Skin:     General: Skin is warm and dry.      Coloration: Skin is not pale.   Neurological:      Mental Status: She is alert.      Motor: No weakness.      Gait: Gait normal.   Psychiatric:         Mood and Affect: Mood normal.         Behavior: Behavior normal.         Thought Content: Thought content normal.                Rick Humphrey, DO    "

## 2024-10-23 PROBLEM — E78.49 OTHER HYPERLIPIDEMIA: Status: ACTIVE | Noted: 2024-10-23

## 2024-10-23 PROBLEM — R73.03 PREDIABETES: Status: ACTIVE | Noted: 2024-10-23

## 2025-01-21 DIAGNOSIS — I10 ESSENTIAL HYPERTENSION: ICD-10-CM

## 2025-01-21 DIAGNOSIS — E03.8 OTHER SPECIFIED HYPOTHYROIDISM: ICD-10-CM

## 2025-01-21 RX ORDER — LEVOTHYROXINE SODIUM 50 UG/1
50 TABLET ORAL DAILY
Qty: 90 TABLET | Refills: 1 | Status: SHIPPED | OUTPATIENT
Start: 2025-01-21

## 2025-01-21 RX ORDER — LISINOPRIL AND HYDROCHLOROTHIAZIDE 12.5; 2 MG/1; MG/1
1 TABLET ORAL DAILY
Qty: 90 TABLET | Refills: 1 | Status: SHIPPED | OUTPATIENT
Start: 2025-01-21

## 2025-01-21 NOTE — TELEPHONE ENCOUNTER
Reason for call: : Rick Humphrey manage these medication! Per Patient no refills left at the Pharmacy!  Repeated the medication name to Patient so no confusion Patient confirmed right medication! Patient stated running low!    [x] Refill   [] Prior Auth  [] Other:     Office:   [x] PCP/Provider -   [] Specialty/Provider -     Medication:     levothyroxine 50 mcg tablet       Dose/Frequency: Take 1 tablet (50 mcg total) by mouth daily,     Quantity: 90 tablet     Medication:    lisinopril-hydrochlorothiazide (PRINZIDE,ZESTORETIC) 20-12.5 MG per tablet     Dose/Frequency:Take 1 tablet by mouth daily,     Quantity:90 tablet     Pharmacy: [place] [id number] [address] [phone]     Does the patient have enough for 3 days?   [] Yes   [x] No - Send as HP to POD

## 2025-04-08 ENCOUNTER — APPOINTMENT (OUTPATIENT)
Dept: LAB | Facility: CLINIC | Age: 75
End: 2025-04-08
Payer: MEDICARE

## 2025-04-08 DIAGNOSIS — R73.03 PREDIABETES: ICD-10-CM

## 2025-04-08 DIAGNOSIS — E03.8 OTHER SPECIFIED HYPOTHYROIDISM: ICD-10-CM

## 2025-04-08 DIAGNOSIS — E78.49 OTHER HYPERLIPIDEMIA: ICD-10-CM

## 2025-04-08 DIAGNOSIS — I10 ESSENTIAL HYPERTENSION: ICD-10-CM

## 2025-04-08 LAB
ALBUMIN SERPL BCG-MCNC: 4.8 G/DL (ref 3.5–5)
ALP SERPL-CCNC: 80 U/L (ref 34–104)
ALT SERPL W P-5'-P-CCNC: 16 U/L (ref 7–52)
ANION GAP SERPL CALCULATED.3IONS-SCNC: 11 MMOL/L (ref 4–13)
AST SERPL W P-5'-P-CCNC: 24 U/L (ref 13–39)
BILIRUB SERPL-MCNC: 2.14 MG/DL (ref 0.2–1)
BUN SERPL-MCNC: 12 MG/DL (ref 5–25)
CALCIUM SERPL-MCNC: 9.6 MG/DL (ref 8.4–10.2)
CHLORIDE SERPL-SCNC: 101 MMOL/L (ref 96–108)
CHOLEST SERPL-MCNC: 197 MG/DL (ref ?–200)
CO2 SERPL-SCNC: 27 MMOL/L (ref 21–32)
CREAT SERPL-MCNC: 0.68 MG/DL (ref 0.6–1.3)
EST. AVERAGE GLUCOSE BLD GHB EST-MCNC: 85 MG/DL
GFR SERPL CREATININE-BSD FRML MDRD: 85 ML/MIN/1.73SQ M
GLUCOSE P FAST SERPL-MCNC: 112 MG/DL (ref 65–99)
HBA1C MFR BLD: 4.6 %
HDLC SERPL-MCNC: 58 MG/DL
LDLC SERPL CALC-MCNC: 110 MG/DL (ref 0–100)
POTASSIUM SERPL-SCNC: 4.4 MMOL/L (ref 3.5–5.3)
PROT SERPL-MCNC: 7.5 G/DL (ref 6.4–8.4)
SODIUM SERPL-SCNC: 139 MMOL/L (ref 135–147)
TRIGL SERPL-MCNC: 144 MG/DL (ref ?–150)
TSH SERPL DL<=0.05 MIU/L-ACNC: 0.86 UIU/ML (ref 0.45–4.5)

## 2025-04-08 PROCEDURE — 80061 LIPID PANEL: CPT

## 2025-04-08 PROCEDURE — 80053 COMPREHEN METABOLIC PANEL: CPT

## 2025-04-08 PROCEDURE — 84443 ASSAY THYROID STIM HORMONE: CPT

## 2025-04-08 PROCEDURE — 83036 HEMOGLOBIN GLYCOSYLATED A1C: CPT

## 2025-04-08 PROCEDURE — 36415 COLL VENOUS BLD VENIPUNCTURE: CPT

## 2025-04-11 ENCOUNTER — RESULTS FOLLOW-UP (OUTPATIENT)
Dept: FAMILY MEDICINE CLINIC | Facility: CLINIC | Age: 75
End: 2025-04-11

## 2025-04-22 ENCOUNTER — OFFICE VISIT (OUTPATIENT)
Dept: FAMILY MEDICINE CLINIC | Facility: CLINIC | Age: 75
End: 2025-04-22
Payer: MEDICARE

## 2025-04-22 VITALS
TEMPERATURE: 97.7 F | WEIGHT: 183 LBS | DIASTOLIC BLOOD PRESSURE: 70 MMHG | BODY MASS INDEX: 33.68 KG/M2 | HEART RATE: 76 BPM | HEIGHT: 62 IN | RESPIRATION RATE: 20 BRPM | SYSTOLIC BLOOD PRESSURE: 132 MMHG

## 2025-04-22 DIAGNOSIS — I10 PRIMARY HYPERTENSION: ICD-10-CM

## 2025-04-22 DIAGNOSIS — Z91.89 FRAMINGHAM CARDIAC RISK 10-20% IN NEXT 10 YEARS: ICD-10-CM

## 2025-04-22 DIAGNOSIS — R73.03 PREDIABETES: ICD-10-CM

## 2025-04-22 DIAGNOSIS — E03.8 OTHER SPECIFIED HYPOTHYROIDISM: ICD-10-CM

## 2025-04-22 DIAGNOSIS — E78.49 OTHER HYPERLIPIDEMIA: ICD-10-CM

## 2025-04-22 DIAGNOSIS — Z00.00 MEDICARE ANNUAL WELLNESS VISIT, SUBSEQUENT: Primary | ICD-10-CM

## 2025-04-22 PROCEDURE — G0439 PPPS, SUBSEQ VISIT: HCPCS | Performed by: FAMILY MEDICINE

## 2025-04-22 PROCEDURE — 99214 OFFICE O/P EST MOD 30 MIN: CPT | Performed by: FAMILY MEDICINE

## 2025-04-22 PROCEDURE — G2211 COMPLEX E/M VISIT ADD ON: HCPCS | Performed by: FAMILY MEDICINE

## 2025-04-22 RX ORDER — ATORVASTATIN CALCIUM 10 MG/1
10 TABLET, FILM COATED ORAL DAILY
Qty: 100 TABLET | Refills: 1 | Status: SHIPPED | OUTPATIENT
Start: 2025-04-22

## 2025-04-22 RX ORDER — LEVOTHYROXINE SODIUM 50 UG/1
50 TABLET ORAL DAILY
Qty: 100 TABLET | Refills: 1 | Status: SHIPPED | OUTPATIENT
Start: 2025-04-22

## 2025-04-22 RX ORDER — LISINOPRIL AND HYDROCHLOROTHIAZIDE 12.5; 2 MG/1; MG/1
1 TABLET ORAL DAILY
Qty: 100 TABLET | Refills: 1 | Status: SHIPPED | OUTPATIENT
Start: 2025-04-22

## 2025-04-22 NOTE — PROGRESS NOTES
Name: Kelley Dinero      : 1950      MRN: [id number]  Encounter Provider: Rick Humphrey DO  Encounter Date: 2025   Encounter department: [facility]  :  Assessment & Plan  Primary hypertension  Stable, f/u q6m  Orders:    Comprehensive metabolic panel; Future    lisinopril-hydrochlorothiazide (PRINZIDE,ZESTORETIC) 20-12.5 MG per tablet; Take 1 tablet by mouth daily    Other specified hypothyroidism  stable  Orders:    TSH, 3rd generation; Future    levothyroxine 50 mcg tablet; Take 1 tablet (50 mcg total) by mouth daily    Prediabetes  Watch diet  Orders:    Comprehensive metabolic panel; Future    Hemoglobin A1C; Future    Other hyperlipidemia  Consider starting statin, FLP in 6m  Orders:    Lipid Panel with Direct LDL reflex; Future    atorvastatin (LIPITOR) 10 mg tablet; Take 1 tablet (10 mg total) by mouth daily    [place] cardiac risk 10-20% in next 10 years  Statin risk/benefit advised  Orders:    atorvastatin (LIPITOR) 10 mg tablet; Take 1 tablet (10 mg total) by mouth daily    Medicare annual wellness visit, subsequent  updated              History of Present Illness   HPI  Regular diet  Water 2L / d  Never started lipitor but has it  Labs reviewed  Tsh wnl  Fbs 112 but A1c ok    Review of Systems   Respiratory:  Negative for shortness of breath.    Cardiovascular:  Negative for chest pain.     Family History   Problem Relation Age of Onset    No Known Problems Mother     No Known Problems Father     Thyroid disease Sister     Thyroid disease Sister     Thyroid disease Sister     Thyroid disease Brother     Thyroid disease Brother     Thyroid disease Brother       Social History     Tobacco Use    Smoking status: Never     Passive exposure: Never    Smokeless tobacco: Never   Vaping Use    Vaping status: Never Used   Substance Use Topics    Alcohol use: Never    Drug use: Never      Current Outpatient Medications   Medication Instructions    atorvastatin (LIPITOR) 10 mg, Oral,  "Daily    brimonidine tartrate 0.2 % ophthalmic solution instill 1 drop into right eye twice daily    levothyroxine 50 mcg, Oral, Daily    lisinopril-hydrochlorothiazide (PRINZIDE,ZESTORETIC) 20-12.5 MG per tablet 1 tablet, Oral, Daily     >>>>>>>>  Return in about 6 months (around 10/22/2025) for Recheck.  >>>>>>>>    [POCT]  No results found for this or any previous visit (from the past 24 hours).    Visit Vitals  /70   Pulse 76   Temp 97.7 °F (36.5 °C)   Resp 20   Ht 5' 2\" (1.575 m)   Wt 83 kg (183 lb)   BMI 33.47 kg/m²   OB Status Hysterectomy   Smoking Status Never   BSA 1.84 m²        Objective   /70   Pulse 76   Temp 97.7 °F (36.5 °C)   Resp 20   Ht 5' 2\" (1.575 m)   Wt 83 kg (183 lb)   BMI 33.47 kg/m²      Physical Exam  Vitals and nursing note reviewed.   Constitutional:       General: She is not in acute distress.     Appearance: She is well-developed. She is obese. She is not ill-appearing.   HENT:      Head: Normocephalic.      Right Ear: External ear normal.      Left Ear: External ear normal.      Nose: No congestion.   Eyes:      General: No scleral icterus.     Conjunctiva/sclera: Conjunctivae normal.   Cardiovascular:      Rate and Rhythm: Normal rate and regular rhythm.      Heart sounds: No murmur heard.  Pulmonary:      Effort: Pulmonary effort is normal. No respiratory distress.      Breath sounds: No wheezing.   Abdominal:      Palpations: Abdomen is soft.   Musculoskeletal:         General: No deformity.      Cervical back: Neck supple.      Right lower leg: No edema.      Left lower leg: No edema.   Skin:     General: Skin is warm and dry.      Coloration: Skin is not jaundiced or pale.   Neurological:      Mental Status: She is alert.      Motor: No weakness.      Gait: Gait normal.   Psychiatric:         Mood and Affect: Mood normal.         Behavior: Behavior normal.         Thought Content: Thought content normal.         "

## 2025-04-22 NOTE — ASSESSMENT & PLAN NOTE
stable  Orders:    TSH, 3rd generation; Future    levothyroxine 50 mcg tablet; Take 1 tablet (50 mcg total) by mouth daily     normal/clear to auscultation bilaterally/no wheezes/no rales/no rhonchi

## 2025-04-22 NOTE — ASSESSMENT & PLAN NOTE
Stable, f/u q6m  Orders:    Comprehensive metabolic panel; Future    lisinopril-hydrochlorothiazide (PRINZIDE,ZESTORETIC) 20-12.5 MG per tablet; Take 1 tablet by mouth daily

## 2025-04-23 PROBLEM — Z00.00 MEDICARE ANNUAL WELLNESS VISIT, SUBSEQUENT: Status: ACTIVE | Noted: 2025-04-23

## 2025-04-23 NOTE — ASSESSMENT & PLAN NOTE
Statin risk/benefit advised  Orders:    atorvastatin (LIPITOR) 10 mg tablet; Take 1 tablet (10 mg total) by mouth daily

## 2025-04-23 NOTE — ASSESSMENT & PLAN NOTE
Consider starting statin, FLP in 6m  Orders:    Lipid Panel with Direct LDL reflex; Future    atorvastatin (LIPITOR) 10 mg tablet; Take 1 tablet (10 mg total) by mouth daily

## 2025-04-23 NOTE — PROGRESS NOTES
Kelley is here for her Subsequent Wellness visit. Last Medicare Wellness visit information reviewed, patient interviewed and updates made to the record today.      Health Risk Assessment:   Patient rates overall health as good. Patient feels that their physical health rating is same. Patient is satisfied with their life. Eyesight was rated as same. Hearing was rated as same. Patient feels that their emotional and mental health rating is same. Patients states they are never, rarely angry. Patient states they are never, rarely unusually tired/fatigued. Pain experienced in the last 7 days has been some. Patient's pain rating has been 1/10. Patient states that she has experienced no weight loss or gain in last 6 months.     Depression Screening:   PHQ-2 Score: 0      Fall Risk Screening:   In the past year, patient has experienced: no history of falling in past year      Urinary Incontinence Screening:   Patient has not leaked urine accidently in the last six months.     Home Safety:  Patient does not have trouble with stairs inside or outside of their home. Patient has working smoke alarms and has working carbon monoxide detector. Home safety hazards include: none.     Nutrition:   Current diet is Regular and Limited junk food.     Medications:   Patient is not currently taking any over-the-counter supplements. Patient is able to manage medications.     Activities of Daily Living (ADLs)/Instrumental Activities of Daily Living (IADLs):   Walk and transfer into and out of bed and chair?: Yes  Dress and groom yourself?: Yes    Bathe or shower yourself?: Yes    Feed yourself? Yes  Do your laundry/housekeeping?: Yes  Manage your money, pay your bills and track your expenses?: Yes  Make your own meals?: Yes    Do your own shopping?: Yes    Previous Hospitalizations:   Any hospitalizations or ED visits within the last 12 months?: No      Advance Care Planning:   Living will: No    Durable POA for healthcare: Yes    Advanced  directive: No    End of Life Decisions reviewed with patient: No      Cognitive Screening:   Provider or family/friend/caregiver concerned regarding cognition?: No    Preventive Screenings      Cardiovascular Screening:    General: Screening Not Indicated and History Lipid Disorder      Diabetes Screening:     General: Screening Current      Colorectal Cancer Screening:     General: Screening Current      Breast Cancer Screening:     General: Screening Current      Cervical Cancer Screening:    General: Screening Not Indicated      Osteoporosis Screening:    General: Risks and Benefits Discussed      Abdominal Aortic Aneurysm (AAA) Screening:        General: Screening Not Indicated      Lung Cancer Screening:     General: Screening Not Indicated      Hepatitis C Screening:    General: Screening Current    Hep C Screening Accepted: No     Immunizations:  - Immunizations due: Zoster (Shingrix)    Screening, Brief Intervention, and Referral to Treatment (SBIRT)     Screening  Typical number of drinks in a day: 0  Typical number of drinks in a week: 0  Interpretation: Low risk drinking behavior.    Single Item Drug Screening:  How often have you used an illegal drug (including marijuana) or a prescription medication for non-medical reasons in the past year? never    Single Item Drug Screen Score: 0  Interpretation: Negative screen for possible drug use disorder    Other Counseling Topics:   Car/seat belt/driving safety and regular weightbearing exercise.

## 2025-05-23 ENCOUNTER — HOSPITAL ENCOUNTER (OUTPATIENT)
Dept: RADIOLOGY | Facility: HOSPITAL | Age: 75
Discharge: HOME | End: 2025-05-23
Attending: FAMILY MEDICINE
Payer: MEDICARE

## 2025-05-23 VITALS — WEIGHT: 180 LBS | BODY MASS INDEX: 33.13 KG/M2 | HEIGHT: 62 IN

## 2025-05-23 DIAGNOSIS — Z12.31 BREAST CANCER SCREENING BY MAMMOGRAM: ICD-10-CM

## 2025-05-23 PROBLEM — Z00.00 MEDICARE ANNUAL WELLNESS VISIT, SUBSEQUENT: Status: RESOLVED | Noted: 2025-04-23 | Resolved: 2025-05-23

## 2025-05-23 PROCEDURE — 77063 BREAST TOMOSYNTHESIS BI: CPT

## 2025-05-23 PROCEDURE — 77067 SCR MAMMO BI INCL CAD: CPT

## 2025-05-29 ENCOUNTER — RESULTS FOLLOW-UP (OUTPATIENT)
Dept: FAMILY MEDICINE CLINIC | Facility: CLINIC | Age: 75
End: 2025-05-29

## 2025-07-22 DIAGNOSIS — I10 PRIMARY HYPERTENSION: ICD-10-CM

## 2025-07-22 DIAGNOSIS — E03.8 OTHER SPECIFIED HYPOTHYROIDISM: ICD-10-CM

## 2025-07-22 NOTE — TELEPHONE ENCOUNTER
NOT A [facility]  Pharmacy never recvd scripts sent 4/22/25    Reason for call:   [x] Refill   [] Prior Auth  [] Other:     Office:   [x] PCP/Provider - Kam  [] Specialty/Provider -     Medication:   Levothyroxine 50 mcg, 1 qd, 90  Lisinopril-hctz 10-12.5,  1 1d, 90    Pharmacy:   [facility]   Does the patient have enough for 3 days?   [x] Yes   [] No - Send as HP to POD    Mail Away Pharmacy   Does the patient have enough for 10 days?   [] Yes   [] No - Send as HP to POD

## 2025-07-23 RX ORDER — LEVOTHYROXINE SODIUM 50 UG/1
50 TABLET ORAL DAILY
Qty: 90 TABLET | Refills: 1 | Status: SHIPPED | OUTPATIENT
Start: 2025-07-23

## 2025-07-23 RX ORDER — LISINOPRIL AND HYDROCHLOROTHIAZIDE 12.5; 2 MG/1; MG/1
1 TABLET ORAL DAILY
Qty: 90 TABLET | Refills: 1 | Status: SHIPPED | OUTPATIENT
Start: 2025-07-23

## (undated) DEVICE — GAUZE SPONGES,16 PLY: Brand: CURITY

## (undated) DEVICE — SKIN MARKER DUAL TIP WITH RULER CAP, FLEXIBLE RULER AND LABELS: Brand: DEVON

## (undated) DEVICE — BETHLEHEM UNIVERSAL MINOR GEN: Brand: CARDINAL HEALTH

## (undated) DEVICE — DRAPE SURGIKIT SADDLE BAG

## (undated) DEVICE — ADHESIVE SKIN HIGH VISCOSITY EXOFIN 1ML

## (undated) DEVICE — SUT MONOCRYL 4-0 PS-2 18 IN Y496G

## (undated) DEVICE — JACKSON-PRATT 100CC BULB RESERVOIR: Brand: CARDINAL HEALTH

## (undated) DEVICE — SUT VICRYL 3-0 SH 27 IN J416H

## (undated) DEVICE — GLOVE SRG BIOGEL ECLIPSE 8

## (undated) DEVICE — PAD GROUNDING ADULT

## (undated) DEVICE — DRAIN JACKSON PRATT 10FR 1/8END: Brand: CARDINAL HEALTH

## (undated) DEVICE — PREMIUM DRY TRAY LF: Brand: MEDLINE INDUSTRIES, INC.

## (undated) DEVICE — BULB SYRINGE,IRRIGATION WITH PROTECTIVE CAP: Brand: DOVER

## (undated) DEVICE — GLOVE INDICATOR PI UNDERGLOVE SZ 8 BLUE

## (undated) DEVICE — INTENDED FOR TISSUE SEPARATION, AND OTHER PROCEDURES THAT REQUIRE A SHARP SURGICAL BLADE TO PUNCTURE OR CUT.: Brand: BARD-PARKER SAFETY BLADES SIZE 15, STERILE

## (undated) DEVICE — MEDI-VAC YANK SUCT HNDL W/TPRD BULBOUS TIP: Brand: CARDINAL HEALTH

## (undated) DEVICE — POV-IOD SOLUTION 4OZ BT

## (undated) DEVICE — DECANTER: Brand: UNBRANDED

## (undated) DEVICE — NEEDLE 25G X 1 1/2

## (undated) DEVICE — DISPOSABLE EQUIPMENT COVER: Brand: SMALL TOWEL DRAPE